# Patient Record
Sex: MALE | Race: WHITE | Employment: OTHER | URBAN - METROPOLITAN AREA
[De-identification: names, ages, dates, MRNs, and addresses within clinical notes are randomized per-mention and may not be internally consistent; named-entity substitution may affect disease eponyms.]

---

## 2017-03-28 ENCOUNTER — HOSPITAL ENCOUNTER (OUTPATIENT)
Dept: LAB | Facility: HOSPITAL | Age: 72
Discharge: HOME | End: 2017-03-28
Attending: INTERNAL MEDICINE
Payer: MEDICARE

## 2017-03-28 ENCOUNTER — PRIOR ORIGINAL RECORDS (OUTPATIENT)
Dept: OTHER | Age: 72
End: 2017-03-28

## 2017-03-28 LAB
ALBUMIN SERPL-MCNC: 3.4 G/DL (ref 3.5–4.8)
ALP LIVER SERPL-CCNC: 102 U/L (ref 45–117)
ALT SERPL-CCNC: 20 U/L (ref 17–63)
AST SERPL-CCNC: 20 U/L (ref 15–41)
BILIRUB SERPL-MCNC: 0.8 MG/DL (ref 0.1–2)
BUN BLD-MCNC: 14 MG/DL (ref 8–20)
CALCIUM BLD-MCNC: 9.1 MG/DL (ref 8.3–10.3)
CHLORIDE: 104 MMOL/L (ref 101–111)
CHOLEST SMN-MCNC: 126 MG/DL (ref ?–200)
CO2: 32 MMOL/L (ref 22–32)
CREAT BLD-MCNC: 0.98 MG/DL (ref 0.7–1.3)
GLUCOSE BLD-MCNC: 96 MG/DL (ref 70–99)
HDLC SERPL-MCNC: 56 MG/DL (ref 45–?)
HDLC SERPL: 2.25 {RATIO} (ref ?–4.97)
LDLC SERPL CALC-MCNC: 54 MG/DL (ref ?–130)
M PROTEIN MFR SERPL ELPH: 7.2 G/DL (ref 6.1–8.3)
NONHDLC SERPL-MCNC: 70 MG/DL (ref ?–130)
POTASSIUM SERPL-SCNC: 3.8 MMOL/L (ref 3.6–5.1)
SODIUM SERPL-SCNC: 142 MMOL/L (ref 136–144)
TRIGLYCERIDES: 79 MG/DL (ref ?–150)
TSI SER-ACNC: 1.72 MIU/ML (ref 0.35–5.5)
VLDL: 16 MG/DL (ref 5–40)

## 2017-03-28 PROCEDURE — 84443 ASSAY THYROID STIM HORMONE: CPT | Performed by: INTERNAL MEDICINE

## 2017-03-28 PROCEDURE — 36415 COLL VENOUS BLD VENIPUNCTURE: CPT | Performed by: INTERNAL MEDICINE

## 2017-03-28 PROCEDURE — 80053 COMPREHEN METABOLIC PANEL: CPT | Performed by: INTERNAL MEDICINE

## 2017-03-28 PROCEDURE — 80061 LIPID PANEL: CPT | Performed by: INTERNAL MEDICINE

## 2017-03-29 LAB
ALBUMIN: 3.4 G/DL
ALKALINE PHOSPHATATE(ALK PHOS): 102 IU/L
ALT (SGPT): 20 U/L
AST (SGOT): 20 U/L
BILIRUBIN TOTAL: 0.8 MG/DL
BUN: 14 MG/DL
CALCIUM: 9.1 MG/DL
CHLORIDE: 104 MEQ/L
CHOLESTEROL, TOTAL: 126 MG/DL
CREATININE, SERUM: 0.98 MG/DL
GLUCOSE: 96 MG/DL
HDL CHOLESTEROL: 56 MG/DL
LDL CHOLESTEROL: 54 MG/DL
NON-HDL CHOLESTEROL: 70 MG/DL
POTASSIUM, SERUM: 3.8 MEQ/L
PROTEIN, TOTAL: 7.2 G/DL
SGOT (AST): 20 IU/L
SGPT (ALT): 20 IU/L
SODIUM: 142 MEQ/L
THYROID STIMULATING HORMONE: 1.72 MLU/L
TRIGLYCERIDES: 79 MG/DL

## 2017-05-30 ENCOUNTER — PRIOR ORIGINAL RECORDS (OUTPATIENT)
Dept: OTHER | Age: 72
End: 2017-05-30

## 2017-08-22 ENCOUNTER — APPOINTMENT (OUTPATIENT)
Dept: GENERAL RADIOLOGY | Facility: HOSPITAL | Age: 72
End: 2017-08-22
Attending: EMERGENCY MEDICINE
Payer: MEDICARE

## 2017-08-22 ENCOUNTER — HOSPITAL ENCOUNTER (EMERGENCY)
Facility: HOSPITAL | Age: 72
Discharge: HOME | End: 2017-08-22
Attending: EMERGENCY MEDICINE
Payer: MEDICARE

## 2017-08-22 ENCOUNTER — APPOINTMENT (OUTPATIENT)
Dept: CT IMAGING | Facility: HOSPITAL | Age: 72
End: 2017-08-22
Attending: EMERGENCY MEDICINE
Payer: MEDICARE

## 2017-08-22 ENCOUNTER — PRIOR ORIGINAL RECORDS (OUTPATIENT)
Dept: OTHER | Age: 72
End: 2017-08-22

## 2017-08-22 VITALS
HEIGHT: 70 IN | SYSTOLIC BLOOD PRESSURE: 124 MMHG | DIASTOLIC BLOOD PRESSURE: 83 MMHG | TEMPERATURE: 98 F | BODY MASS INDEX: 20.76 KG/M2 | HEART RATE: 75 BPM | OXYGEN SATURATION: 98 % | WEIGHT: 145 LBS | RESPIRATION RATE: 17 BRPM

## 2017-08-22 DIAGNOSIS — S92.424A CLOSED NONDISPLACED FRACTURE OF DISTAL PHALANX OF RIGHT GREAT TOE, INITIAL ENCOUNTER: Primary | ICD-10-CM

## 2017-08-22 LAB
ATRIAL RATE: 375 BPM
BASOPHILS # BLD AUTO: 0.07 X10(3) UL (ref 0–0.1)
BASOPHILS NFR BLD AUTO: 0.5 %
BUN BLD-MCNC: 20 MG/DL (ref 8–20)
CALCIUM BLD-MCNC: 9.5 MG/DL (ref 8.3–10.3)
CHLORIDE: 102 MMOL/L (ref 101–111)
CO2: 27 MMOL/L (ref 22–32)
CREAT BLD-MCNC: 1.1 MG/DL (ref 0.7–1.3)
EOSINOPHIL # BLD AUTO: 0.13 X10(3) UL (ref 0–0.3)
EOSINOPHIL NFR BLD AUTO: 1 %
ERYTHROCYTE [DISTWIDTH] IN BLOOD BY AUTOMATED COUNT: 13 % (ref 11.5–16)
GLUCOSE BLD-MCNC: 111 MG/DL (ref 70–99)
HCT VFR BLD AUTO: 41.9 % (ref 37–53)
HGB BLD-MCNC: 14.1 G/DL (ref 13–17)
IMMATURE GRANULOCYTE COUNT: 0.04 X10(3) UL (ref 0–1)
IMMATURE GRANULOCYTE RATIO %: 0.3 %
LYMPHOCYTES # BLD AUTO: 1.06 X10(3) UL (ref 0.9–4)
LYMPHOCYTES NFR BLD AUTO: 7.9 %
MCH RBC QN AUTO: 32.9 PG (ref 27–33.2)
MCHC RBC AUTO-ENTMCNC: 33.7 G/DL (ref 31–37)
MCV RBC AUTO: 97.7 FL (ref 80–99)
MONOCYTES # BLD AUTO: 0.87 X10(3) UL (ref 0.1–0.6)
MONOCYTES NFR BLD AUTO: 6.5 %
NEUTROPHIL ABS PRELIM: 11.18 X10 (3) UL (ref 1.3–6.7)
NEUTROPHILS # BLD AUTO: 11.18 X10(3) UL (ref 1.3–6.7)
NEUTROPHILS NFR BLD AUTO: 83.8 %
PLATELET # BLD AUTO: 223 10(3)UL (ref 150–450)
POTASSIUM SERPL-SCNC: 4.5 MMOL/L (ref 3.6–5.1)
Q-T INTERVAL: 384 MS
QRS DURATION: 92 MS
QTC CALCULATION (BEZET): 448 MS
R AXIS: -56 DEGREES
RBC # BLD AUTO: 4.29 X10(6)UL (ref 3.8–5.8)
RED CELL DISTRIBUTION WIDTH-SD: 46.5 FL (ref 35.1–46.3)
SODIUM SERPL-SCNC: 136 MMOL/L (ref 136–144)
T AXIS: 32 DEGREES
TROPONIN: <0.046 NG/ML (ref ?–0.05)
VENTRICULAR RATE: 82 BPM
WBC # BLD AUTO: 13.4 X10(3) UL (ref 4–13)

## 2017-08-22 PROCEDURE — 99285 EMERGENCY DEPT VISIT HI MDM: CPT

## 2017-08-22 PROCEDURE — 93005 ELECTROCARDIOGRAM TRACING: CPT

## 2017-08-22 PROCEDURE — 73630 X-RAY EXAM OF FOOT: CPT | Performed by: EMERGENCY MEDICINE

## 2017-08-22 PROCEDURE — 85025 COMPLETE CBC W/AUTO DIFF WBC: CPT | Performed by: EMERGENCY MEDICINE

## 2017-08-22 PROCEDURE — 71020 XR CHEST PA + LAT CHEST (CPT=71020): CPT | Performed by: EMERGENCY MEDICINE

## 2017-08-22 PROCEDURE — 70450 CT HEAD/BRAIN W/O DYE: CPT | Performed by: EMERGENCY MEDICINE

## 2017-08-22 PROCEDURE — 28490 TREAT BIG TOE FRACTURE: CPT

## 2017-08-22 PROCEDURE — 36415 COLL VENOUS BLD VENIPUNCTURE: CPT

## 2017-08-22 PROCEDURE — 93010 ELECTROCARDIOGRAM REPORT: CPT

## 2017-08-22 PROCEDURE — 84484 ASSAY OF TROPONIN QUANT: CPT | Performed by: EMERGENCY MEDICINE

## 2017-08-22 PROCEDURE — 80048 BASIC METABOLIC PNL TOTAL CA: CPT | Performed by: EMERGENCY MEDICINE

## 2017-08-22 RX ORDER — METOPROLOL TARTRATE 50 MG/1
50 TABLET, FILM COATED ORAL 2 TIMES DAILY
COMMUNITY
End: 2019-11-12

## 2017-08-22 RX ORDER — LEVOTHYROXINE SODIUM 0.05 MG/1
50 TABLET ORAL
Status: ON HOLD | COMMUNITY
End: 2020-12-05

## 2017-08-22 RX ORDER — MIRTAZAPINE 15 MG/1
15 TABLET, FILM COATED ORAL NIGHTLY
COMMUNITY
End: 2019-11-12

## 2017-08-22 RX ORDER — SIMVASTATIN 20 MG
20 TABLET ORAL NIGHTLY
Status: ON HOLD | COMMUNITY
End: 2020-12-05

## 2017-08-22 NOTE — ED PROVIDER NOTES
Patient Seen in: [facility] Emergency Department    History   Patient presents with:  Fall (musculoskeletal, neurologic)    Stated Complaint: multiple falls hit head cardiac pt     HPI    Fiordaliza Charles is a 75-year-old male presenting with fall.   Patient ha Exam   ED Triage Vitals [08/22/17 0927]  BP: 120/88  Pulse: 74  Resp: 24  Temp: 97.9 °F (36.6 °C)  Temp src: Temporal  SpO2: 99 %  O2 Device: None (Room air)    Current:/83   Pulse 75   Temp 97.9 °F (36.6 °C) (Temporal)   Resp 17   Ht 177.8 cm (5' 10 Fibrillation  Reading: Incomplete right bundle branch block           ============================================================  ED Course  ------------------------------------------------------------  MDM     Since x-rays do show toe fracture that is n

## 2017-08-22 NOTE — ED INITIAL ASSESSMENT (HPI)
Pt w/ tripped and fell one week ago, hit head, R big toe and bruises all over torso. Pt fell last night when trying to walk to BR. Pt in afib at this time. No hx of.

## 2017-09-16 ENCOUNTER — HOSPITAL ENCOUNTER (OUTPATIENT)
Dept: CV DIAGNOSTICS | Facility: HOSPITAL | Age: 72
Discharge: HOME | End: 2017-09-16
Attending: INTERNAL MEDICINE

## 2017-09-16 ENCOUNTER — MYAURORA ACCOUNT LINK (OUTPATIENT)
Dept: OTHER | Age: 72
End: 2017-09-16

## 2017-09-16 DIAGNOSIS — Z95.2 HISTORY OF PROSTHETIC AORTIC VALVE: ICD-10-CM

## 2017-10-03 ENCOUNTER — PRIOR ORIGINAL RECORDS (OUTPATIENT)
Dept: OTHER | Age: 72
End: 2017-10-03

## 2017-12-12 ENCOUNTER — OFFICE VISIT (OUTPATIENT)
Dept: OTHER | Facility: HOSPITAL | Age: 72
End: 2017-12-12
Attending: PREVENTIVE MEDICINE

## 2017-12-12 DIAGNOSIS — Z00.00 PHYSICAL EXAM: Primary | ICD-10-CM

## 2018-02-01 PROBLEM — M72.0 DUPUYTREN'S CONTRACTURE OF BOTH HANDS: Status: ACTIVE | Noted: 2018-02-01

## 2018-03-05 PROBLEM — M72.0 DUPUYTREN'S CONTRACTURE OF LEFT HAND: Status: ACTIVE | Noted: 2018-03-05

## 2018-05-09 ENCOUNTER — HOSPITAL ENCOUNTER (OUTPATIENT)
Dept: LAB | Facility: HOSPITAL | Age: 73
Discharge: HOME | End: 2018-05-09
Attending: INTERNAL MEDICINE
Payer: MEDICARE

## 2018-05-09 ENCOUNTER — PRIOR ORIGINAL RECORDS (OUTPATIENT)
Dept: OTHER | Age: 73
End: 2018-05-09

## 2018-05-09 PROCEDURE — 80053 COMPREHEN METABOLIC PANEL: CPT | Performed by: INTERNAL MEDICINE

## 2018-05-09 PROCEDURE — 80061 LIPID PANEL: CPT | Performed by: INTERNAL MEDICINE

## 2018-05-09 PROCEDURE — 84443 ASSAY THYROID STIM HORMONE: CPT | Performed by: INTERNAL MEDICINE

## 2018-05-09 PROCEDURE — 36415 COLL VENOUS BLD VENIPUNCTURE: CPT | Performed by: INTERNAL MEDICINE

## 2018-05-15 ENCOUNTER — PRIOR ORIGINAL RECORDS (OUTPATIENT)
Dept: OTHER | Age: 73
End: 2018-05-15

## 2018-05-15 ENCOUNTER — MYAURORA ACCOUNT LINK (OUTPATIENT)
Dept: OTHER | Age: 73
End: 2018-05-15

## 2018-05-21 ENCOUNTER — PRIOR ORIGINAL RECORDS (OUTPATIENT)
Dept: OTHER | Age: 73
End: 2018-05-21

## 2018-06-01 LAB
ALBUMIN: 3.7 G/DL
ALKALINE PHOSPHATATE(ALK PHOS): 117 IU/L
BILIRUBIN TOTAL: 1.4 MG/DL
BUN: 14 MG/DL
CALCIUM: 9.5 MG/DL
CHLORIDE: 100 MEQ/L
CHOLESTEROL, TOTAL: 130 MG/DL
CREATININE, SERUM: 1.08 MG/DL
GLUCOSE: 79 MG/DL
HDL CHOLESTEROL: 55 MG/DL
LDL CHOLESTEROL: 55 MG/DL
POTASSIUM, SERUM: 4.5 MEQ/L
PROTEIN, TOTAL: 7.5 G/DL
SGOT (AST): 32 IU/L
SGPT (ALT): 37 IU/L
SODIUM: 138 MEQ/L
THYROID STIMULATING HORMONE: 5.28 MLU/L
TRIGLYCERIDES: 101 MG/DL

## 2018-06-04 ENCOUNTER — MYAURORA ACCOUNT LINK (OUTPATIENT)
Dept: OTHER | Age: 73
End: 2018-06-04

## 2018-06-04 ENCOUNTER — PRIOR ORIGINAL RECORDS (OUTPATIENT)
Dept: OTHER | Age: 73
End: 2018-06-04

## 2018-06-04 ENCOUNTER — HOSPITAL ENCOUNTER (OUTPATIENT)
Dept: CARDIOLOGY CLINIC | Facility: HOSPITAL | Age: 73
Discharge: HOME | End: 2018-06-04
Attending: INTERNAL MEDICINE

## 2018-06-04 DIAGNOSIS — Z95.2 AORTIC VALVE REPLACED: ICD-10-CM

## 2018-06-04 DIAGNOSIS — I65.23 BILATERAL CAROTID ARTERY STENOSIS: ICD-10-CM

## 2018-06-04 DIAGNOSIS — I25.10 CORONARY ARTERIOSCLEROSIS: ICD-10-CM

## 2018-06-07 ENCOUNTER — PRIOR ORIGINAL RECORDS (OUTPATIENT)
Dept: OTHER | Age: 73
End: 2018-06-07

## 2018-07-03 ENCOUNTER — PRIOR ORIGINAL RECORDS (OUTPATIENT)
Dept: OTHER | Age: 73
End: 2018-07-03

## 2018-09-13 ENCOUNTER — PRIOR ORIGINAL RECORDS (OUTPATIENT)
Dept: OTHER | Age: 73
End: 2018-09-13

## 2018-09-13 ENCOUNTER — HOSPITAL ENCOUNTER (OUTPATIENT)
Dept: CV DIAGNOSTICS | Facility: HOSPITAL | Age: 73
Discharge: HOME | End: 2018-09-13
Attending: INTERNAL MEDICINE
Payer: MEDICARE

## 2018-09-13 DIAGNOSIS — I25.10 CORONARY ARTERY DISEASE: ICD-10-CM

## 2018-09-13 DIAGNOSIS — N18.9 CHRONIC KIDNEY DISEASE: ICD-10-CM

## 2018-09-13 DIAGNOSIS — I65.23 BILATERAL CAROTID ARTERY STENOSIS: ICD-10-CM

## 2018-09-13 DIAGNOSIS — I50.20 SYSTOLIC HEART FAILURE (HCC): ICD-10-CM

## 2018-09-13 PROCEDURE — 93350 STRESS TTE ONLY: CPT | Performed by: INTERNAL MEDICINE

## 2018-09-13 PROCEDURE — 93017 CV STRESS TEST TRACING ONLY: CPT | Performed by: INTERNAL MEDICINE

## 2018-09-13 PROCEDURE — 93018 CV STRESS TEST I&R ONLY: CPT | Performed by: INTERNAL MEDICINE

## 2018-09-13 NOTE — PROGRESS NOTES
Pt here for SE. Resting EKG showed a fib HR in the 80s and no symptoms. No h/o afib noted in chart. Dr. Cj Miner office was notified and spoke with Narendra Najera RN.   She d/w Dr. Francisca Montejo who requested the pt to proceed with the SE and then to f/u in his office

## 2018-09-20 ENCOUNTER — PRIOR ORIGINAL RECORDS (OUTPATIENT)
Dept: OTHER | Age: 73
End: 2018-09-20

## 2018-10-01 ENCOUNTER — PRIOR ORIGINAL RECORDS (OUTPATIENT)
Dept: OTHER | Age: 73
End: 2018-10-01

## 2018-10-17 ENCOUNTER — HOSPITAL ENCOUNTER (OUTPATIENT)
Dept: CV DIAGNOSTICS | Facility: HOSPITAL | Age: 73
Discharge: HOME | End: 2018-10-17
Attending: INTERNAL MEDICINE
Payer: MEDICARE

## 2018-10-17 DIAGNOSIS — I50.20 SYSTOLIC HEART FAILURE (HCC): ICD-10-CM

## 2018-10-17 DIAGNOSIS — I48.0 AF (PAROXYSMAL ATRIAL FIBRILLATION) (HCC): ICD-10-CM

## 2018-10-17 DIAGNOSIS — I48.91 A-FIB (HCC): ICD-10-CM

## 2018-10-17 PROCEDURE — 93227 XTRNL ECG REC<48 HR R&I: CPT | Performed by: INTERNAL MEDICINE

## 2018-10-17 PROCEDURE — 93226 XTRNL ECG REC<48 HR SCAN A/R: CPT | Performed by: INTERNAL MEDICINE

## 2018-10-17 PROCEDURE — 93225 XTRNL ECG REC<48 HRS REC: CPT | Performed by: INTERNAL MEDICINE

## 2018-12-26 ENCOUNTER — OFFICE VISIT (OUTPATIENT)
Dept: OTHER | Facility: HOSPITAL | Age: 73
End: 2018-12-26
Attending: PREVENTIVE MEDICINE

## 2018-12-26 ENCOUNTER — PRIOR ORIGINAL RECORDS (OUTPATIENT)
Dept: OTHER | Age: 73
End: 2018-12-26

## 2019-01-10 ENCOUNTER — APPOINTMENT (OUTPATIENT)
Dept: CT IMAGING | Facility: HOSPITAL | Age: 74
DRG: 194 | End: 2019-01-10
Attending: INTERNAL MEDICINE
Payer: MEDICARE

## 2019-01-10 ENCOUNTER — APPOINTMENT (OUTPATIENT)
Dept: GENERAL RADIOLOGY | Facility: HOSPITAL | Age: 74
DRG: 194 | End: 2019-01-10
Attending: EMERGENCY MEDICINE
Payer: MEDICARE

## 2019-01-10 ENCOUNTER — HOSPITAL ENCOUNTER (INPATIENT)
Facility: HOSPITAL | Age: 74
LOS: 2 days | Discharge: HOME | DRG: 194 | End: 2019-01-12
Attending: EMERGENCY MEDICINE | Admitting: HOSPITALIST
Payer: MEDICARE

## 2019-01-10 DIAGNOSIS — Z79.01 ANTICOAGULATED: ICD-10-CM

## 2019-01-10 DIAGNOSIS — R04.2 HEMOPTYSIS: ICD-10-CM

## 2019-01-10 DIAGNOSIS — J18.9 COMMUNITY ACQUIRED PNEUMONIA OF LEFT LUNG, UNSPECIFIED PART OF LUNG: Primary | ICD-10-CM

## 2019-01-10 PROBLEM — E03.9 ACQUIRED HYPOTHYROIDISM: Chronic | Status: ACTIVE | Noted: 2019-01-10

## 2019-01-10 PROBLEM — I48.20 CHRONIC ATRIAL FIBRILLATION (HCC): Chronic | Status: ACTIVE | Noted: 2019-01-10

## 2019-01-10 PROBLEM — E78.00 PURE HYPERCHOLESTEROLEMIA: Status: ACTIVE | Noted: 2019-01-10

## 2019-01-10 LAB
ALBUMIN SERPL-MCNC: 3.8 G/DL (ref 3.1–4.5)
ALBUMIN/GLOB SERPL: 0.9 {RATIO} (ref 1–2)
ALP LIVER SERPL-CCNC: 125 U/L (ref 45–117)
ALT SERPL-CCNC: 32 U/L (ref 17–63)
ANION GAP SERPL CALC-SCNC: 6 MMOL/L (ref 0–18)
ANTIBODY SCREEN: NEGATIVE
APTT PPP: 36 SECONDS (ref 26.1–34.6)
AST SERPL-CCNC: 27 U/L (ref 15–41)
ATRIAL RATE: 113 BPM
BASOPHILS # BLD AUTO: 0.04 X10(3) UL (ref 0–0.1)
BASOPHILS NFR BLD AUTO: 0.7 %
BILIRUB SERPL-MCNC: 1.6 MG/DL (ref 0.1–2)
BUN BLD-MCNC: 17 MG/DL (ref 8–20)
BUN/CREAT SERPL: 15 (ref 10–20)
CALCIUM BLD-MCNC: 9.2 MG/DL (ref 8.3–10.3)
CHLORIDE SERPL-SCNC: 103 MMOL/L (ref 101–111)
CO2 SERPL-SCNC: 30 MMOL/L (ref 22–32)
CREAT BLD-MCNC: 1.13 MG/DL (ref 0.7–1.3)
EOSINOPHIL # BLD AUTO: 0.19 X10(3) UL (ref 0–0.3)
EOSINOPHIL NFR BLD AUTO: 3.2 %
ERYTHROCYTE [DISTWIDTH] IN BLOOD BY AUTOMATED COUNT: 12.8 % (ref 11.5–16)
GLOBULIN PLAS-MCNC: 4.4 G/DL (ref 2.8–4.4)
GLUCOSE BLD-MCNC: 88 MG/DL (ref 70–99)
HCT VFR BLD AUTO: 40.8 % (ref 37–53)
HGB BLD-MCNC: 13 G/DL (ref 13–17)
IMMATURE GRANULOCYTE COUNT: 0.03 X10(3) UL (ref 0–1)
IMMATURE GRANULOCYTE RATIO %: 0.5 %
INR BLD: 1.42 (ref 0.9–1.1)
LYMPHOCYTES # BLD AUTO: 0.87 X10(3) UL (ref 0.9–4)
LYMPHOCYTES NFR BLD AUTO: 14.6 %
M PROTEIN MFR SERPL ELPH: 8.2 G/DL (ref 6.4–8.2)
MCH RBC QN AUTO: 31.6 PG (ref 27–33.2)
MCHC RBC AUTO-ENTMCNC: 31.9 G/DL (ref 31–37)
MCV RBC AUTO: 99 FL (ref 80–99)
MONOCYTES # BLD AUTO: 0.65 X10(3) UL (ref 0.1–1)
MONOCYTES NFR BLD AUTO: 10.9 %
NEUTROPHIL ABS PRELIM: 4.19 X10 (3) UL (ref 1.3–6.7)
NEUTROPHILS # BLD AUTO: 4.19 X10(3) UL (ref 1.3–6.7)
NEUTROPHILS NFR BLD AUTO: 70.1 %
OSMOLALITY SERPL CALC.SUM OF ELEC: 289 MOSM/KG (ref 275–295)
PLATELET # BLD AUTO: 161 10(3)UL (ref 150–450)
POTASSIUM SERPL-SCNC: 3.7 MMOL/L (ref 3.6–5.1)
PSA SERPL DL<=0.01 NG/ML-MCNC: 17.9 SECONDS (ref 12.4–14.7)
Q-T INTERVAL: 378 MS
QRS DURATION: 92 MS
QTC CALCULATION (BEZET): 457 MS
R AXIS: -34 DEGREES
RBC # BLD AUTO: 4.12 X10(6)UL (ref 3.8–5.8)
RED CELL DISTRIBUTION WIDTH-SD: 46.8 FL (ref 35.1–46.3)
RH BLOOD TYPE: POSITIVE
SODIUM SERPL-SCNC: 139 MMOL/L (ref 136–144)
T AXIS: 14 DEGREES
VENTRICULAR RATE: 88 BPM
WBC # BLD AUTO: 6 X10(3) UL (ref 4–13)

## 2019-01-10 PROCEDURE — 71275 CT ANGIOGRAPHY CHEST: CPT | Performed by: INTERNAL MEDICINE

## 2019-01-10 PROCEDURE — 99223 1ST HOSP IP/OBS HIGH 75: CPT | Performed by: HOSPITALIST

## 2019-01-10 PROCEDURE — 71045 X-RAY EXAM CHEST 1 VIEW: CPT | Performed by: EMERGENCY MEDICINE

## 2019-01-10 RX ORDER — ATORVASTATIN CALCIUM 10 MG/1
10 TABLET, FILM COATED ORAL NIGHTLY
Status: DISCONTINUED | OUTPATIENT
Start: 2019-01-10 | End: 2019-01-12

## 2019-01-10 RX ORDER — METOPROLOL TARTRATE 50 MG/1
50 TABLET, FILM COATED ORAL
Status: DISCONTINUED | OUTPATIENT
Start: 2019-01-10 | End: 2019-01-12

## 2019-01-10 RX ORDER — ONDANSETRON 2 MG/ML
4 INJECTION INTRAMUSCULAR; INTRAVENOUS EVERY 6 HOURS PRN
Status: DISCONTINUED | OUTPATIENT
Start: 2019-01-10 | End: 2019-01-12

## 2019-01-10 RX ORDER — METOCLOPRAMIDE HYDROCHLORIDE 5 MG/ML
10 INJECTION INTRAMUSCULAR; INTRAVENOUS EVERY 8 HOURS PRN
Status: DISCONTINUED | OUTPATIENT
Start: 2019-01-10 | End: 2019-01-12

## 2019-01-10 RX ORDER — MIRTAZAPINE 15 MG/1
15 TABLET, FILM COATED ORAL NIGHTLY
Status: DISCONTINUED | OUTPATIENT
Start: 2019-01-10 | End: 2019-01-12

## 2019-01-10 RX ORDER — LEVOTHYROXINE SODIUM 0.05 MG/1
50 TABLET ORAL
Status: DISCONTINUED | OUTPATIENT
Start: 2019-01-10 | End: 2019-01-12

## 2019-01-10 RX ORDER — SODIUM CHLORIDE 9 MG/ML
INJECTION, SOLUTION INTRAVENOUS CONTINUOUS
Status: DISCONTINUED | OUTPATIENT
Start: 2019-01-10 | End: 2019-01-12

## 2019-01-10 RX ORDER — SODIUM CHLORIDE 9 MG/ML
INJECTION, SOLUTION INTRAVENOUS CONTINUOUS
Status: ACTIVE | OUTPATIENT
Start: 2019-01-10 | End: 2019-01-10

## 2019-01-10 RX ORDER — ACETAMINOPHEN 325 MG/1
650 TABLET ORAL EVERY 6 HOURS PRN
Status: DISCONTINUED | OUTPATIENT
Start: 2019-01-10 | End: 2019-01-12

## 2019-01-10 NOTE — CONSULTS
Pulmonary / Critical Care H&P/Consult       NAME: Neeru Barakat - ROOM: [address] [place] [address] - MRN: [id number] - Age: 74year old - :  1945    Date of Admission: 1/10/2019 11:08 AM  Admission Diagnosis: Anticoagulated [Z79.01]  Hemoptysis [R04.2]  Community quittin.3      Smokeless tobacco: Never Used    Substance and Sexual Activity      Alcohol use: Yes        Comment: socially      Drug use: No      Sexual activity: Not on file    Other Topics      Concerns:        Not on file    Social History Narrat Neck:   Post radiation changes on R   Back:     Symmetric, no curvature, ROM normal, no CVA tenderness   Lungs:     Clear to auscultation bilaterally, respirations unlabored   Chest wall:    No tenderness or deformity   Heart:    Regular rate and rhythm,

## 2019-01-10 NOTE — ED INITIAL ASSESSMENT (HPI)
Patient reports he was coughing blood and mucous this morning. Reports he hasn't been sick recently, hx of throat cancer, thinks it could be from irritation in his throat.   Patient in NARD, mask on

## 2019-01-10 NOTE — H&P
[facility]  History and Physical     Georgia Kavitha Patient Status:  Observation    1945 MRN [id number]   [facility] 5NW-A Attending Omid Cabral, [address] Day # 0 PCP Danielle Murphy MD     Chief Complaint: Hemop times daily. Disp:  Rfl:    simvastatin 20 MG Oral Tab Take 20 mg by mouth nightly. Disp:  Rfl:    Levothyroxine Sodium 50 MCG Oral Tab Take 50 mcg by mouth before breakfast. Disp:  Rfl:    mirtazapine 15 MG Oral Tab Take 15 mg by mouth nightly.  Disp:  Rfl INR  1.42*       No results for input(s): TROP, CK in the last 168 hours. Imaging: Imaging data reviewed in Epic. ASSESSMENT / PLAN:     1. Hemoptysis-we will hold Eliquis. Pulmonology on consult may need bron. Will monitor hemoglobin levels.

## 2019-01-10 NOTE — ED PROVIDER NOTES
Patient Seen in: [facility] Emergency Department    History   Patient presents with:  Hemoptysis (respiratory): hx of throat cancer    Stated Complaint: coughing up blood since this morning.     HPI    53-year-old male comes the hospital complaint of signs reviewed. All other systems reviewed and negative except as noted above.     Physical Exam     ED Triage Vitals   BP 01/10/19 1130 (!) 161/97   Pulse 01/10/19 1040 84   Resp 01/10/19 1040 18   Temp 01/10/19 1040 97.4 °F (36.3 °C)   Temp src 01/10 -----------         ------                     CBC W/ DIFFERENTIAL[[id number]]          Abnormal            Final result                 Please view results for these tests on the individual orders. TYPE AND SCREEN    Narrative:      The following o 0.9 % 250 mL IVPB (not administered)           MDM   Patient was given antibiotics for his pneumonia.   Patient's bleeding may be worsening with his anticoagulation and the case will be admitted the hospital for further workup and care with pulmonology on c

## 2019-01-11 ENCOUNTER — PRIOR ORIGINAL RECORDS (OUTPATIENT)
Dept: OTHER | Age: 74
End: 2019-01-11

## 2019-01-11 LAB
ADENOVIRUS PCR:: NEGATIVE
ANION GAP SERPL CALC-SCNC: 7 MMOL/L (ref 0–18)
B PERT DNA SPEC QL NAA+PROBE: NEGATIVE
BASOPHILS # BLD AUTO: 0.03 X10(3) UL (ref 0–0.1)
BASOPHILS NFR BLD AUTO: 0.7 %
BUN BLD-MCNC: 16 MG/DL (ref 8–20)
BUN/CREAT SERPL: 16.2 (ref 10–20)
C PNEUM DNA SPEC QL NAA+PROBE: NEGATIVE
CALCIUM BLD-MCNC: 8.4 MG/DL (ref 8.3–10.3)
CHLORIDE SERPL-SCNC: 108 MMOL/L (ref 101–111)
CO2 SERPL-SCNC: 25 MMOL/L (ref 22–32)
CORONAVIRUS 229E PCR:: NEGATIVE
CORONAVIRUS HKU1 PCR:: NEGATIVE
CORONAVIRUS NL63 PCR:: NEGATIVE
CORONAVIRUS OC43 PCR:: NEGATIVE
CREAT BLD-MCNC: 0.99 MG/DL (ref 0.7–1.3)
EOSINOPHIL # BLD AUTO: 0.18 X10(3) UL (ref 0–0.3)
EOSINOPHIL NFR BLD AUTO: 4.1 %
ERYTHROCYTE [DISTWIDTH] IN BLOOD BY AUTOMATED COUNT: 12.7 % (ref 11.5–16)
FLUAV RNA SPEC QL NAA+PROBE: NEGATIVE
FLUBV RNA SPEC QL NAA+PROBE: NEGATIVE
GLUCOSE BLD-MCNC: 71 MG/DL (ref 70–99)
HCT VFR BLD AUTO: 36 % (ref 37–53)
HGB BLD-MCNC: 11.5 G/DL (ref 13–17)
IMMATURE GRANULOCYTE COUNT: 0.02 X10(3) UL (ref 0–1)
IMMATURE GRANULOCYTE RATIO %: 0.5 %
LYMPHOCYTES # BLD AUTO: 0.73 X10(3) UL (ref 0.9–4)
LYMPHOCYTES NFR BLD AUTO: 16.6 %
MCH RBC QN AUTO: 31.9 PG (ref 27–33.2)
MCHC RBC AUTO-ENTMCNC: 31.9 G/DL (ref 31–37)
MCV RBC AUTO: 100 FL (ref 80–99)
METAPNEUMOVIRUS PCR:: NEGATIVE
MONOCYTES # BLD AUTO: 0.61 X10(3) UL (ref 0.1–1)
MONOCYTES NFR BLD AUTO: 13.9 %
MYCOPLASMA PNEUMONIA PCR:: NEGATIVE
NEUTROPHIL ABS PRELIM: 2.82 X10 (3) UL (ref 1.3–6.7)
NEUTROPHILS # BLD AUTO: 2.82 X10(3) UL (ref 1.3–6.7)
NEUTROPHILS NFR BLD AUTO: 64.2 %
OSMOLALITY SERPL CALC.SUM OF ELEC: 290 MOSM/KG (ref 275–295)
PARAINFLUENZA 1 PCR:: NEGATIVE
PARAINFLUENZA 2 PCR:: NEGATIVE
PARAINFLUENZA 3 PCR:: NEGATIVE
PARAINFLUENZA 4 PCR:: NEGATIVE
PLATELET # BLD AUTO: 115 10(3)UL (ref 150–450)
POTASSIUM SERPL-SCNC: 3.8 MMOL/L (ref 3.6–5.1)
PROCALCITONIN SERPL-MCNC: <0.11 NG/ML
RBC # BLD AUTO: 3.6 X10(6)UL (ref 3.8–5.8)
RED CELL DISTRIBUTION WIDTH-SD: 46.9 FL (ref 35.1–46.3)
RHINOVIRUS/ENTERO PCR:: NEGATIVE
RSV RNA SPEC QL NAA+PROBE: NEGATIVE
SODIUM SERPL-SCNC: 140 MMOL/L (ref 136–144)
WBC # BLD AUTO: 4.4 X10(3) UL (ref 4–13)

## 2019-01-11 PROCEDURE — 99232 SBSQ HOSP IP/OBS MODERATE 35: CPT | Performed by: HOSPITALIST

## 2019-01-11 NOTE — PLAN OF CARE
Patient/Family Goals    • Patient/Family Short Term Goal Progressing          Patient/Family Goals    • Patient/Family Short Term Goal Progressing        Received patient A/O x4, VSS with no complaints of pain.  Patient is up with a steady gait to the bathr

## 2019-01-11 NOTE — CM/SW NOTE
01/11/19 1200   CM/SW Screening   Referral Source Social Work (self-referral)   Information Source Chart review;Nursing rounds   Patient's Mental Status Alert;Oriented   Patient lives with Spouse   Patient Status Prior to Admission   Independent with AD

## 2019-01-11 NOTE — HOME CARE LIAISON
Met with patient at the bedside. Patient is declined [facility]. Brochure and liaison's card provided with contact information. All questions addressed and answered.

## 2019-01-11 NOTE — PLAN OF CARE
Patient/Family Goals    • Patient/Family Long Term Goal Progressing    • Patient/Family Short Term Goal Progressing               Pt is A+Ox4. VSS on RA. Dentures. Pt admits to some small amounts of hemoptysis this AM. Sputum sample obtained. RVP pending.

## 2019-01-11 NOTE — PROGRESS NOTES
[address] [facility] Patient Status:  Inpatient    1945 MRN [id number]   [facility] 5NW-A Attending Tiffany Ascencio MD   Hosp Day # 1 PCP Jared Penaloza MD     SUBJECTIVE: No further hemoptysis overnight, very small volu non-tender, non-distended, positive BS.                         Extremity: No clubbing or cyanosis. no edema                          Skin: No rashes or lesions.        Recent Labs   Lab  01/10/19   1145  01/11/19   0637   GLU  88  71   BUN  17  16   CREAT

## 2019-01-11 NOTE — PROGRESS NOTES
[facility]  Progress Note     Juhi Robbins Patient Status:  Inpatient    1945 MRN [id number]   [facility] 5NW-A Attending Dayo Pryor MD   Hosp Day # 1 PCP Jenifer Goetz MD     Chief Complaint: Hemoptysis    S: Patient mg Oral Nightly   • azithromycin  500 mg Intravenous Q24H       ASSESSMENT / PLAN:     1. Hemoptysis:  Resolved, off eliquis  2. Aortic Aneurysm:  No chest or back pain, will refer back to [facility] for surveillance  3. PAF  4. Pneumonia:  IV abx  5.  Hypothyroid

## 2019-01-12 VITALS
TEMPERATURE: 98 F | RESPIRATION RATE: 16 BRPM | WEIGHT: 145 LBS | DIASTOLIC BLOOD PRESSURE: 83 MMHG | HEIGHT: 70 IN | SYSTOLIC BLOOD PRESSURE: 133 MMHG | BODY MASS INDEX: 20.76 KG/M2 | OXYGEN SATURATION: 100 % | HEART RATE: 70 BPM

## 2019-01-12 LAB
LEGIONELLA PNEUMOPHILA AG, URI: NEGATIVE
STREPTOCOCCUS PNEUMONIAE AG, U: NEGATIVE

## 2019-01-12 PROCEDURE — 99239 HOSP IP/OBS DSCHRG MGMT >30: CPT | Performed by: HOSPITALIST

## 2019-01-12 RX ORDER — CEFUROXIME AXETIL 500 MG/1
500 TABLET ORAL 2 TIMES DAILY
Qty: 10 TABLET | Refills: 0 | Status: SHIPPED | OUTPATIENT
Start: 2019-01-12 | End: 2019-01-17

## 2019-01-12 NOTE — PROGRESS NOTES
[facility]  Progress Note     Iqra Still Patient Status:  Inpatient    1945 MRN [id number]   [facility] 5NW-A Attending Nikunj Herrera MD   Hosp Day # 2 PCP Edward Villarreal MD     Chief Complaint: Hemoptysis    S: Patient Oral Nightly   • azithromycin  500 mg Intravenous Q24H       ASSESSMENT / PLAN:     1. Hemoptysis:  Resolved, off eliquis  2. Aortic Aneurysm:  No chest or back pain, will refer back to [facility] for surveillance  3. PAF  4. Pneumonia:  IV abx  5.  Hypothyroidism

## 2019-01-12 NOTE — DISCHARGE SUMMARY
[facility]OSPITAL[facility]  DISCHARGE SUMMARY     Eduard Hamilton Patient Status:  Inpatient    1945 MRN [id number]   [facility] 5NW-A Attending Danica Williamson MD   Hosp Day # 2 PCP Chinedu Whitfield MD     Date of Admission: 1/10/2019  Date of recommendations (brief descriptions):  • None    Lab/Test results pending at Discharge:   · None    Consultants:  • Dr. Sanchez Nahomi    Discharge Medication List:     Discharge Medications      START taking these medications      Instructions Prescription deta [97.7 °F (36.5 °C)-98.1 °F (36.7 °C)] 98.1 °F (36.7 °C)  Pulse:  [70-86] 70  Resp:  [16] 16  BP: (128-157)/(83-89) 133/83    Physical Exam:    General: No acute distress. Respiratory: Clear to auscultation bilaterally. No wheezes. No rhonchi.   Cardiovasc

## 2019-01-12 NOTE — PROGRESS NOTES
Pulmonary Progress Note      NAME: Wood Manning - ROOM: 077/640-I - MRN: [id number] - Age: 74year old - : 1945    Assessment/Plan:  1. Hemoptysis: cxr with L sided opacities. Chronically on eliquis.  Suspect hemoptysis related to pneumonia and a CREATSERUM  1.13  0.99   GFRAA  73  86   GFRNAA  63  74   CA  9.2  8.4   ALB  3.8   --    NA  139  140   K  3.7  3.8   CL  103  108   CO2  30.0  25.0   ALKPHO  125*   --    AST  27   --    ALT  32   --    BILT  1.6   --    TP  8.2   --        Imaging:  I

## 2019-01-12 NOTE — PLAN OF CARE
Patient/Family Goals    • Patient/Family Long Term Goal Progressing    • Patient/Family Short Term Goal Progressing        RESPIRATORY - ADULT    • Achieves optimal ventilation and oxygenation Progressing          PROBLEM:PNA    ASSESSMENT:P IS ALERT TIMES

## 2019-02-01 ENCOUNTER — MYAURORA ACCOUNT LINK (OUTPATIENT)
Dept: OTHER | Age: 74
End: 2019-02-01

## 2019-02-01 ENCOUNTER — PRIOR ORIGINAL RECORDS (OUTPATIENT)
Dept: OTHER | Age: 74
End: 2019-02-01

## 2019-02-05 LAB
ALBUMIN: 3.9 G/DL
ALKALINE PHOSPHATATE(ALK PHOS): 108 IU/L
BILIRUBIN TOTAL: 1.2 MG/DL
BUN: 14 MG/DL
BUN: 16 MG/DL
CALCIUM: 8.4 MG/DL
CALCIUM: 9 MG/DL
CHLORIDE: 101 MEQ/L
CHLORIDE: 108 MEQ/L
CHOLESTEROL, TOTAL: 126 MG/DL
CREATININE, SERUM: 0.99 MG/DL
CREATININE, SERUM: 1.12 MG/DL
GGT: 66 IU/L
GLOBULIN: 2.7 G/DL
GLUCOSE: 71 MG/DL
GLUCOSE: 90 MG/DL
HDL CHOLESTEROL: 56 MG/DL
HEMATOCRIT: 35.9 %
HEMATOCRIT: 36 %
HEMOGLOBIN: 11.5 G/DL
HEMOGLOBIN: 12.3 G/DL
IRON, TOTAL: 57 MCG/DL
LDH: 159 IU/L
LDL CHOLESTEROL: 56 MG/DL
PLATELETS: 115 K/UL
PLATELETS: 130 K/UL
POTASSIUM, SERUM: 3.8 MEQ/L
POTASSIUM, SERUM: 4.3 MEQ/L
PROTEIN, TOTAL: 6.6 G/DL
RED BLOOD COUNT: 3.6 X 10-6/U
RED BLOOD COUNT: 3.73 X 10-6/U
SGOT (AST): 30 IU/L
SGPT (ALT): 26 IU/L
SODIUM: 138 MEQ/L
SODIUM: 140 MEQ/L
TRIGLYCERIDES: 64 MG/DL
WHITE BLOOD COUNT: 4.2 X 10-3/U
WHITE BLOOD COUNT: 4.4 X 10-3/U

## 2019-02-26 RX ORDER — SIMVASTATIN 20 MG
1 TABLET ORAL AT BEDTIME
COMMUNITY
Start: 2018-05-21 | End: 2019-07-11 | Stop reason: SDUPTHER

## 2019-02-26 RX ORDER — TADALAFIL 10 MG/1
TABLET ORAL
COMMUNITY
Start: 2012-06-05 | End: 2023-05-02 | Stop reason: SDUPTHER

## 2019-02-26 RX ORDER — MIRTAZAPINE 15 MG/1
1 TABLET, FILM COATED ORAL AT BEDTIME
COMMUNITY
Start: 2011-04-12 | End: 2020-02-25 | Stop reason: SDUPTHER

## 2019-02-26 RX ORDER — LEVOTHYROXINE SODIUM 0.03 MG/1
1 TABLET ORAL DAILY
COMMUNITY
Start: 2011-04-12 | End: 2022-09-22 | Stop reason: SDUPTHER

## 2019-02-26 RX ORDER — METOPROLOL TARTRATE 50 MG/1
TABLET, FILM COATED ORAL
COMMUNITY
Start: 2018-05-21 | End: 2019-07-11 | Stop reason: SDUPTHER

## 2019-02-28 VITALS
DIASTOLIC BLOOD PRESSURE: 72 MMHG | HEIGHT: 69 IN | SYSTOLIC BLOOD PRESSURE: 112 MMHG | WEIGHT: 147 LBS | BODY MASS INDEX: 21.77 KG/M2 | HEART RATE: 60 BPM

## 2019-02-28 VITALS
SYSTOLIC BLOOD PRESSURE: 130 MMHG | HEART RATE: 60 BPM | WEIGHT: 142 LBS | BODY MASS INDEX: 21.03 KG/M2 | HEIGHT: 69 IN | DIASTOLIC BLOOD PRESSURE: 90 MMHG

## 2019-03-01 VITALS
HEART RATE: 66 BPM | DIASTOLIC BLOOD PRESSURE: 66 MMHG | WEIGHT: 148 LBS | BODY MASS INDEX: 21.92 KG/M2 | SYSTOLIC BLOOD PRESSURE: 112 MMHG | HEIGHT: 69 IN

## 2019-03-04 ENCOUNTER — HOSPITAL ENCOUNTER (OUTPATIENT)
Dept: CV DIAGNOSTICS | Facility: HOSPITAL | Age: 74
Discharge: HOME | End: 2019-03-04
Attending: INTERNAL MEDICINE

## 2019-03-04 DIAGNOSIS — Z95.2 HEART VALVE REPLACED: ICD-10-CM

## 2019-03-04 DIAGNOSIS — I25.10 CORONARY ARTERIOSCLEROSIS: ICD-10-CM

## 2019-03-04 DIAGNOSIS — I65.23 BILATERAL CAROTID ARTERY STENOSIS: ICD-10-CM

## 2019-03-19 ENCOUNTER — APPOINTMENT (OUTPATIENT)
Dept: CARDIOLOGY | Age: 74
End: 2019-03-19

## 2019-05-21 ENCOUNTER — OFFICE VISIT (OUTPATIENT)
Dept: CARDIOLOGY | Age: 74
End: 2019-05-21

## 2019-05-21 VITALS
HEIGHT: 69 IN | HEART RATE: 80 BPM | BODY MASS INDEX: 21.33 KG/M2 | DIASTOLIC BLOOD PRESSURE: 74 MMHG | WEIGHT: 144 LBS | SYSTOLIC BLOOD PRESSURE: 148 MMHG

## 2019-05-21 DIAGNOSIS — N18.30 CHRONIC RENAL IMPAIRMENT, STAGE 3 (MODERATE) (CMD): ICD-10-CM

## 2019-05-21 DIAGNOSIS — I42.0 DILATED CARDIOMYOPATHY (CMD): Primary | ICD-10-CM

## 2019-05-21 DIAGNOSIS — I65.23 ASYMPTOMATIC CAROTID ARTERY STENOSIS, BILATERAL: ICD-10-CM

## 2019-05-21 DIAGNOSIS — Z98.890 H/O MITRAL VALVE REPAIR: ICD-10-CM

## 2019-05-21 DIAGNOSIS — Z95.3 HISTORY OF AORTIC VALVE REPLACEMENT WITH BIOPROSTHETIC VALVE: ICD-10-CM

## 2019-05-21 DIAGNOSIS — Z87.891 HISTORY OF SMOKING: ICD-10-CM

## 2019-05-21 DIAGNOSIS — Z79.01 CURRENT USE OF LONG TERM ANTICOAGULATION: ICD-10-CM

## 2019-05-21 DIAGNOSIS — I48.20 CHRONIC ATRIAL FIBRILLATION (CMD): ICD-10-CM

## 2019-05-21 DIAGNOSIS — E78.00 PURE HYPERCHOLESTEROLEMIA: ICD-10-CM

## 2019-05-21 DIAGNOSIS — J44.9 CHRONIC OBSTRUCTIVE PULMONARY DISEASE, UNSPECIFIED COPD TYPE (CMD): ICD-10-CM

## 2019-05-21 PROCEDURE — 99215 OFFICE O/P EST HI 40 MIN: CPT | Performed by: INTERNAL MEDICINE

## 2019-05-23 ENCOUNTER — TELEPHONE (OUTPATIENT)
Dept: CARDIOLOGY | Age: 74
End: 2019-05-23

## 2019-07-11 ENCOUNTER — TELEPHONE (OUTPATIENT)
Dept: CARDIOLOGY | Age: 74
End: 2019-07-11

## 2019-07-11 DIAGNOSIS — I42.0 DILATED CARDIOMYOPATHY (CMD): Primary | ICD-10-CM

## 2019-07-11 RX ORDER — METOPROLOL TARTRATE 50 MG/1
TABLET, FILM COATED ORAL
Qty: 220 TABLET | Refills: 3 | Status: SHIPPED | OUTPATIENT
Start: 2019-07-11 | End: 2020-02-14 | Stop reason: SDUPTHER

## 2019-07-11 RX ORDER — SIMVASTATIN 20 MG
20 TABLET ORAL AT BEDTIME
Qty: 90 TABLET | Refills: 1 | Status: SHIPPED | OUTPATIENT
Start: 2019-07-11 | End: 2020-01-23 | Stop reason: SDUPTHER

## 2019-08-06 ENCOUNTER — APPOINTMENT (OUTPATIENT)
Dept: CT IMAGING | Facility: HOSPITAL | Age: 74
DRG: 177 | End: 2019-08-06
Payer: MEDICARE

## 2019-08-06 ENCOUNTER — HOSPITAL ENCOUNTER (INPATIENT)
Facility: HOSPITAL | Age: 74
LOS: 3 days | Discharge: HOME | DRG: 177 | End: 2019-08-10
Admitting: HOSPITALIST
Payer: MEDICARE

## 2019-08-06 ENCOUNTER — APPOINTMENT (OUTPATIENT)
Dept: GENERAL RADIOLOGY | Facility: HOSPITAL | Age: 74
DRG: 177 | End: 2019-08-06
Payer: MEDICARE

## 2019-08-06 DIAGNOSIS — R77.8 ELEVATED TROPONIN: ICD-10-CM

## 2019-08-06 DIAGNOSIS — K52.9 GASTROENTERITIS: ICD-10-CM

## 2019-08-06 DIAGNOSIS — R55 SYNCOPE, NEAR: Primary | ICD-10-CM

## 2019-08-06 DIAGNOSIS — J18.9 COMMUNITY ACQUIRED PNEUMONIA OF LEFT LOWER LOBE OF LUNG: ICD-10-CM

## 2019-08-06 LAB
ALBUMIN SERPL-MCNC: 3 G/DL (ref 3.4–5)
ALBUMIN/GLOB SERPL: 0.8 {RATIO} (ref 1–2)
ALP LIVER SERPL-CCNC: 100 U/L (ref 45–117)
ALT SERPL-CCNC: 21 U/L (ref 16–61)
ANION GAP SERPL CALC-SCNC: 6 MMOL/L (ref 0–18)
APTT PPP: 30.9 SECONDS (ref 25.4–36.1)
AST SERPL-CCNC: 19 U/L (ref 15–37)
BASOPHILS # BLD AUTO: 0.04 X10(3) UL (ref 0–0.2)
BASOPHILS NFR BLD AUTO: 0.4 %
BILIRUB SERPL-MCNC: 2.2 MG/DL (ref 0.1–2)
BUN BLD-MCNC: 29 MG/DL (ref 7–18)
BUN/CREAT SERPL: 25.7 (ref 10–20)
CALCIUM BLD-MCNC: 8.8 MG/DL (ref 8.5–10.1)
CHLORIDE SERPL-SCNC: 98 MMOL/L (ref 98–112)
CO2 SERPL-SCNC: 30 MMOL/L (ref 21–32)
CREAT BLD-MCNC: 1.13 MG/DL (ref 0.7–1.3)
DEPRECATED RDW RBC AUTO: 50.4 FL (ref 35.1–46.3)
EOSINOPHIL # BLD AUTO: 0.02 X10(3) UL (ref 0–0.7)
EOSINOPHIL NFR BLD AUTO: 0.2 %
ERYTHROCYTE [DISTWIDTH] IN BLOOD BY AUTOMATED COUNT: 14.1 % (ref 11–15)
GLOBULIN PLAS-MCNC: 3.8 G/DL (ref 2.8–4.4)
GLUCOSE BLD-MCNC: 136 MG/DL (ref 70–99)
GLUCOSE BLD-MCNC: 139 MG/DL (ref 70–99)
HCT VFR BLD AUTO: 43.4 % (ref 39–53)
HGB BLD-MCNC: 14.5 G/DL (ref 13–17.5)
IMM GRANULOCYTES # BLD AUTO: 0.07 X10(3) UL (ref 0–1)
IMM GRANULOCYTES NFR BLD: 0.6 %
INR BLD: 1.17 (ref 0.9–1.1)
LIPASE SERPL-CCNC: 141 U/L (ref 73–393)
LYMPHOCYTES # BLD AUTO: 0.73 X10(3) UL (ref 1–4)
LYMPHOCYTES NFR BLD AUTO: 6.4 %
M PROTEIN MFR SERPL ELPH: 6.8 G/DL (ref 6.4–8.2)
MCH RBC QN AUTO: 32.6 PG (ref 26–34)
MCHC RBC AUTO-ENTMCNC: 33.4 G/DL (ref 31–37)
MCV RBC AUTO: 97.5 FL (ref 80–100)
MONOCYTES # BLD AUTO: 1.43 X10(3) UL (ref 0.1–1)
MONOCYTES NFR BLD AUTO: 12.6 %
NEUTROPHILS # BLD AUTO: 9.08 X10 (3) UL (ref 1.5–7.7)
NEUTROPHILS # BLD AUTO: 9.08 X10(3) UL (ref 1.5–7.7)
NEUTROPHILS NFR BLD AUTO: 79.8 %
OSMOLALITY SERPL CALC.SUM OF ELEC: 286 MOSM/KG (ref 275–295)
PLATELET # BLD AUTO: 146 10(3)UL (ref 150–450)
POTASSIUM SERPL-SCNC: 3.6 MMOL/L (ref 3.5–5.1)
PSA SERPL DL<=0.01 NG/ML-MCNC: 15.4 SECONDS (ref 12.5–14.7)
RBC # BLD AUTO: 4.45 X10(6)UL (ref 3.8–5.8)
SODIUM SERPL-SCNC: 134 MMOL/L (ref 136–145)
TROPONIN I SERPL-MCNC: 0.27 NG/ML (ref ?–0.04)
WBC # BLD AUTO: 11.4 X10(3) UL (ref 4–11)

## 2019-08-06 PROCEDURE — 74177 CT ABD & PELVIS W/CONTRAST: CPT

## 2019-08-06 PROCEDURE — 99223 1ST HOSP IP/OBS HIGH 75: CPT | Performed by: HOSPITALIST

## 2019-08-06 PROCEDURE — 71045 X-RAY EXAM CHEST 1 VIEW: CPT

## 2019-08-06 RX ORDER — ASPIRIN 81 MG/1
324 TABLET, CHEWABLE ORAL ONCE
Status: COMPLETED | OUTPATIENT
Start: 2019-08-06 | End: 2019-08-07

## 2019-08-06 RX ORDER — SODIUM CHLORIDE 9 MG/ML
INJECTION, SOLUTION INTRAVENOUS CONTINUOUS
Status: DISCONTINUED | OUTPATIENT
Start: 2019-08-06 | End: 2019-08-07

## 2019-08-06 RX ORDER — FAMOTIDINE 10 MG/ML
20 INJECTION, SOLUTION INTRAVENOUS ONCE
Status: COMPLETED | OUTPATIENT
Start: 2019-08-06 | End: 2019-08-06

## 2019-08-06 RX ORDER — SODIUM CHLORIDE 9 MG/ML
1000 INJECTION, SOLUTION INTRAVENOUS ONCE
Status: COMPLETED | OUTPATIENT
Start: 2019-08-06 | End: 2019-08-06

## 2019-08-06 RX ORDER — ONDANSETRON 2 MG/ML
4 INJECTION INTRAMUSCULAR; INTRAVENOUS ONCE
Status: COMPLETED | OUTPATIENT
Start: 2019-08-06 | End: 2019-08-06

## 2019-08-07 ENCOUNTER — APPOINTMENT (OUTPATIENT)
Dept: CV DIAGNOSTICS | Facility: HOSPITAL | Age: 74
DRG: 177 | End: 2019-08-07
Attending: INTERNAL MEDICINE
Payer: MEDICARE

## 2019-08-07 PROBLEM — R79.89 ELEVATED TROPONIN: Status: ACTIVE | Noted: 2019-08-07

## 2019-08-07 PROBLEM — J18.9 COMMUNITY ACQUIRED PNEUMONIA OF LEFT LOWER LOBE OF LUNG: Status: ACTIVE | Noted: 2019-08-07

## 2019-08-07 PROBLEM — R11.10 VOMITING: Status: ACTIVE | Noted: 2019-08-07

## 2019-08-07 PROBLEM — K52.9 GASTROENTERITIS: Status: ACTIVE | Noted: 2019-08-07

## 2019-08-07 PROBLEM — R77.8 ELEVATED TROPONIN: Status: ACTIVE | Noted: 2019-08-07

## 2019-08-07 PROBLEM — Z95.3 HISTORY OF AORTIC VALVE REPLACEMENT WITH BIOPROSTHETIC VALVE: Status: ACTIVE | Noted: 2019-08-07

## 2019-08-07 PROBLEM — R19.7 DIARRHEA: Status: ACTIVE | Noted: 2019-08-07

## 2019-08-07 LAB
ANION GAP SERPL CALC-SCNC: 6 MMOL/L (ref 0–18)
ANION GAP SERPL CALC-SCNC: 6 MMOL/L (ref 0–18)
APTT PPP: 32.4 SECONDS (ref 25.4–36.1)
APTT PPP: 88.1 SECONDS (ref 25.4–36.1)
APTT PPP: 90.9 SECONDS (ref 25.4–36.1)
BILIRUB UR QL STRIP.AUTO: NEGATIVE
BUN BLD-MCNC: 24 MG/DL (ref 7–18)
BUN BLD-MCNC: 25 MG/DL (ref 7–18)
BUN/CREAT SERPL: 24.5 (ref 10–20)
BUN/CREAT SERPL: 25.8 (ref 10–20)
C DIFF TOX B STL QL: NEGATIVE
CALCIUM BLD-MCNC: 8.4 MG/DL (ref 8.5–10.1)
CALCIUM BLD-MCNC: 8.5 MG/DL (ref 8.5–10.1)
CHLORIDE SERPL-SCNC: 104 MMOL/L (ref 98–112)
CHLORIDE SERPL-SCNC: 105 MMOL/L (ref 98–112)
CLARITY UR REFRACT.AUTO: CLEAR
CO2 SERPL-SCNC: 26 MMOL/L (ref 21–32)
CO2 SERPL-SCNC: 27 MMOL/L (ref 21–32)
COLOR UR AUTO: YELLOW
COMPLEXED PSA SERPL-MCNC: 6.27 NG/ML (ref ?–4)
CREAT BLD-MCNC: 0.93 MG/DL (ref 0.7–1.3)
CREAT BLD-MCNC: 1.02 MG/DL (ref 0.7–1.3)
DEPRECATED RDW RBC AUTO: 53.3 FL (ref 35.1–46.3)
ERYTHROCYTE [DISTWIDTH] IN BLOOD BY AUTOMATED COUNT: 14.2 % (ref 11–15)
GLUCOSE BLD-MCNC: 88 MG/DL (ref 70–99)
GLUCOSE BLD-MCNC: 92 MG/DL (ref 70–99)
GLUCOSE UR STRIP.AUTO-MCNC: NEGATIVE MG/DL
HCT VFR BLD AUTO: 41.5 % (ref 39–53)
HGB BLD-MCNC: 13.4 G/DL (ref 13–17.5)
LEUKOCYTE ESTERASE UR QL STRIP.AUTO: NEGATIVE
MCH RBC QN AUTO: 32.8 PG (ref 26–34)
MCHC RBC AUTO-ENTMCNC: 32.3 G/DL (ref 31–37)
MCV RBC AUTO: 101.7 FL (ref 80–100)
NITRITE UR QL STRIP.AUTO: NEGATIVE
OSMOLALITY SERPL CALC.SUM OF ELEC: 287 MOSM/KG (ref 275–295)
OSMOLALITY SERPL CALC.SUM OF ELEC: 288 MOSM/KG (ref 275–295)
PH UR STRIP.AUTO: 6 [PH] (ref 4.5–8)
PLATELET # BLD AUTO: 131 10(3)UL (ref 150–450)
POTASSIUM SERPL-SCNC: 4.1 MMOL/L (ref 3.5–5.1)
POTASSIUM SERPL-SCNC: 4.3 MMOL/L (ref 3.5–5.1)
PROT UR STRIP.AUTO-MCNC: 30 MG/DL
RBC # BLD AUTO: 4.08 X10(6)UL (ref 3.8–5.8)
RBC UR QL AUTO: NEGATIVE
SODIUM SERPL-SCNC: 137 MMOL/L (ref 136–145)
SODIUM SERPL-SCNC: 137 MMOL/L (ref 136–145)
SP GR UR STRIP.AUTO: 1.05 (ref 1–1.03)
TROPONIN I SERPL-MCNC: 0.16 NG/ML (ref ?–0.04)
TROPONIN I SERPL-MCNC: 0.17 NG/ML (ref ?–0.04)
UROBILINOGEN UR STRIP.AUTO-MCNC: <2 MG/DL
WBC # BLD AUTO: 6.4 X10(3) UL (ref 4–11)

## 2019-08-07 PROCEDURE — 99233 SBSQ HOSP IP/OBS HIGH 50: CPT | Performed by: HOSPITALIST

## 2019-08-07 PROCEDURE — 93306 TTE W/DOPPLER COMPLETE: CPT | Performed by: INTERNAL MEDICINE

## 2019-08-07 PROCEDURE — 99222 1ST HOSP IP/OBS MODERATE 55: CPT | Performed by: INTERNAL MEDICINE

## 2019-08-07 RX ORDER — ONDANSETRON 2 MG/ML
4 INJECTION INTRAMUSCULAR; INTRAVENOUS EVERY 6 HOURS PRN
Status: DISCONTINUED | OUTPATIENT
Start: 2019-08-07 | End: 2019-08-10

## 2019-08-07 RX ORDER — HEPARIN SODIUM 5000 [USP'U]/ML
60 INJECTION INTRAVENOUS; SUBCUTANEOUS ONCE
Status: COMPLETED | OUTPATIENT
Start: 2019-08-07 | End: 2019-08-07

## 2019-08-07 RX ORDER — LEVOTHYROXINE SODIUM 0.05 MG/1
50 TABLET ORAL
Status: DISCONTINUED | OUTPATIENT
Start: 2019-08-07 | End: 2019-08-10

## 2019-08-07 RX ORDER — HEPARIN SODIUM AND DEXTROSE 10000; 5 [USP'U]/100ML; G/100ML
INJECTION INTRAVENOUS CONTINUOUS
Status: DISCONTINUED | OUTPATIENT
Start: 2019-08-07 | End: 2019-08-10

## 2019-08-07 RX ORDER — METOCLOPRAMIDE HYDROCHLORIDE 5 MG/ML
10 INJECTION INTRAMUSCULAR; INTRAVENOUS EVERY 8 HOURS PRN
Status: DISCONTINUED | OUTPATIENT
Start: 2019-08-07 | End: 2019-08-10

## 2019-08-07 RX ORDER — ACETAMINOPHEN 325 MG/1
650 TABLET ORAL EVERY 6 HOURS PRN
Status: DISCONTINUED | OUTPATIENT
Start: 2019-08-07 | End: 2019-08-10

## 2019-08-07 RX ORDER — ATORVASTATIN CALCIUM 10 MG/1
10 TABLET, FILM COATED ORAL NIGHTLY
Status: DISCONTINUED | OUTPATIENT
Start: 2019-08-07 | End: 2019-08-10

## 2019-08-07 RX ORDER — METOPROLOL TARTRATE 50 MG/1
50 TABLET, FILM COATED ORAL
Status: DISCONTINUED | OUTPATIENT
Start: 2019-08-07 | End: 2019-08-09

## 2019-08-07 RX ORDER — SODIUM CHLORIDE 9 MG/ML
INJECTION, SOLUTION INTRAVENOUS CONTINUOUS
Status: ACTIVE | OUTPATIENT
Start: 2019-08-07 | End: 2019-08-07

## 2019-08-07 RX ORDER — SODIUM CHLORIDE 9 MG/ML
INJECTION, SOLUTION INTRAVENOUS CONTINUOUS
Status: DISCONTINUED | OUTPATIENT
Start: 2019-08-07 | End: 2019-08-10

## 2019-08-07 RX ORDER — MIRTAZAPINE 15 MG/1
15 TABLET, FILM COATED ORAL NIGHTLY
Status: DISCONTINUED | OUTPATIENT
Start: 2019-08-07 | End: 2019-08-10

## 2019-08-07 RX ORDER — HEPARIN SODIUM AND DEXTROSE 10000; 5 [USP'U]/100ML; G/100ML
12 INJECTION INTRAVENOUS ONCE
Status: COMPLETED | OUTPATIENT
Start: 2019-08-07 | End: 2019-08-07

## 2019-08-07 NOTE — DIETARY MALNUTRITION NOTE
[facility]    NUTRITION INITIAL ASSESSMENT    Pt meets malnutrition criteria.     CRITERIA FOR MALNUTRITION DIAGNOSIS:  Criteria for non-severe malnutrition diagnosis: acute illness/injury related to wt loss 1-2% in 1 week, energy intake less than 75% NUTRITION:  Diet: clear liquid  Oral Supplements: Ensure Enlive tid once diet advances    FOOD/NUTRITION RELATED HISTORY:  Appetite: Poor  Intake: 50%  Intake Meeting Needs: No, but supplements to maximize  Food Allergies: No   Cultural/Ethnic/Religi

## 2019-08-07 NOTE — PROGRESS NOTES
[facility]  Progress Note     Sarah Gravely Patient Status:  Inpatient    1945 MRN [id number]   [facility] 2NE-A Attending Chrystal Oppenheim, MD   Hosp Day # 0 PCP Ebony Evans MD     Chief Complaint: AMS  S:  noel let point for obstruction  1. Bowel rest, IVF  2. PNA- suspect aspiration  1. IV abx and wean O2   3. Near syncope with elevated troponin- suspect demand ischemia and not ACS  1. Cardiology following and started on heparin due to GI issues  4.  Pelvic lytic les

## 2019-08-07 NOTE — PLAN OF CARE
Patient is alert and oriented x4, forgetful, poor judgement. AF on tele. Oxygenation is 100% on RA. L sounds clear-diminished. VSS. Patient denies chest pain, shortness of breath, palpitations. Denies pain. Abdomen distended. Bowel sounds active.  Patient is devices as appropriate  - Consider OT/PT consult to assist with strengthening/mobility  - Encourage toileting schedule  Outcome: Progressing     Problem: RESPIRATORY - ADULT  Goal: Achieves optimal ventilation and oxygenation  Description  INTERVENTIONS:

## 2019-08-07 NOTE — HISTORICAL OFFICE NOTE
Cynthia Elder MD - 2019 10:45 AM CDT  Formatting of this note might be different from the original.  [facility]  Clinic Note    Marisela Freitas  : 1945  PCP: Yahaira Porras MD    Reason for follow-up:  Chief Complain rate ranging between 34 and 93 bpm there there were rare PVCs no other prognostically significant arrhythmia no significant pauses or symptoms reported.     Follow-up 2D echo with Doppler in March 2019 she was stable and showed LVEF of 50 to 55% with well-f fibrillation or flutter. But subsequently he developed atrial fibrillation which was found incidentally during echo imaging in 2018. Subsequent Holter proved all times atrial fibrillation in October 2018. Patient was initiated on anticoagulation in 2018. 05/2008     Family Hx:  Family History   Problem Relation Age of Onset   • Coronary Artery Disease Neg Hx   premature CAD   • Aneurysm Neg Hx   AAA     Social Hx:  he reports that he quit smoking about 17 years ago.  He has a 20.00 pack-year smoking histo soft, non tender  Extremities: intact pulses, no edema  Neuro: alert and oriented  Musculoskeletal: normal gait, normal tone  Skin: warm, dry  Psychiatric: normal affect    Labs:  No visits with results within 3 Month(s) from this visit.    Latest known vis stroke with atrial fibrillation. 7. F/u with me in 9 months. All d/w the patient in detail. Please send copy to PCP.     Jeet Figueroa MD  05/21/19    Electronically signed by Jeet Figueroa MD at 05/21/2019 11:37 AM CDT

## 2019-08-07 NOTE — PHYSICAL THERAPY NOTE
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  Research supports that patients with this level of impairment may be safe for home without therapy services

## 2019-08-07 NOTE — CONSULTS
[facility]  Cardiology Consultation    Fransisca Blevins Patient Status:  Inpatient    1945 MRN [id number]   [facility] 2NE-A Attending Naya Andrews MD   Hosp Day # 0 PCP Ken Butler MD     Reason for Consultation:  Lala Capellan Facility-Administered Medications:   •  0.9% NaCl infusion, , Intravenous, Continuous  •  acetaminophen (TYLENOL) tab 650 mg, 650 mg, Oral, Q6H PRN  •  ondansetron HCl (ZOFRAN) injection 4 mg, 4 mg, Intravenous, Q6H PRN  •  Metoclopramide HCl (REGLAN) inje carotids: no bruits; no tm; mouth moist. Sp neck surgery. Cardiac: irregular rate and rhythm. S1, S2 normal. no pericardial rub, S3. 2/6 luis right base  Lungs: decreased bs left base. Abdomen: Soft, non-tender. BS a bit hyperactive.  No masses  Extremi

## 2019-08-07 NOTE — CM/SW NOTE
Patient was screened during rounds, chart reviewed; received order for home health HEATHER garg recommending: home. Page to [facility] liaison to offer choice/services.     Current living situation:  Type of Home: House   Home Layout: (Split level)  Stairs

## 2019-08-07 NOTE — ED PROVIDER NOTES
Patient Seen in: [facility] Emergency Department    History   Patient presents with:  Nausea/Vomiting/Diarrhea (gastrointestinal)    Stated Complaint: NVD    Pleasant 74year-old in the emergency department with nausea vomiting and diarrhea and gene History    Tobacco Use      Smoking status: Former Smoker        Packs/day: 1.00        Types: Cigarettes        Quit date: 2006        Years since quittin.9      Smokeless tobacco: Never Used    Alcohol use: Yes      Comment: socially    Drug us Total 2.2 (*)     Albumin 3.0 (*)     A/G Ratio 0.8 (*)     All other components within normal limits   TROPONIN I - Abnormal; Notable for the following components:    Troponin 0.273 (*)     All other components within normal limits   PROTHROMBIN TIME (PT) COMPARISON:  [facility] , XR CHEST AP PORTABLE  (CPT=71045), 1/10/2019, 11:47. [facility] , XR CHEST PA + LAT CHEST (CPT=71020), 8/22/2017, 10:14. INDICATIONS:  NVD  PATIENT STATED HISTORY: (As transcribed by Technologist)  Patient complains of weakness and nvd. discrete transition point is not clearly identified. There is also gas and fluid noted within the colon. The overall appearance is somewhat nonspecific. An ileus may be possible. No evidence of free intraperitoneal air.   Urinary bladder is moderately d 8/6/2019 Unknown

## 2019-08-07 NOTE — H&P
[facility]  History and Physical     Lolly Jovanna Patient Status:  Emergency    1945 MRN [id number]   Location [address] [facility] Attending Doreen Monique MD   Hosp Day # 0 PCP Ignacio Gaming MD     Chief Complaint that he quit smoking about 12 years ago. His smoking use included cigarettes. He smoked 1.00 pack per day. He has never used smokeless tobacco. He reports that he drinks alcohol. He reports that he does not use drugs.     Family History: No family history o Recent Labs   Lab 08/06/19  2136   *   BUN 29*   CREATSERUM 1.13   GFRAA 73   GFRNAA 63   CA 8.8   ALB 3.0*   *   K 3.6   CL 98   CO2 30.0   ALKPHO 100   AST 19   ALT 21   BILT 2.2*   TP 6.8       Estimated Creatinine Clearance: 49.3 mL/

## 2019-08-07 NOTE — PLAN OF CARE
Report received from [address] [place] [address]. Assumed care of Pt. At [phone]. Pt. Is Axox4 and on room air with O2 sat of 94%. Pt. Has hoarse speech d/t hx of throat cancer and radiation. Pt. Has diminished bilateral breath sounds. Pt. Denies shortness of breath.  Pt. H ordered  - Initiate emergency measures for life threatening arrhythmias  - Monitor electrolytes and administer replacement therapy as ordered  Outcome: Progressing     Problem: SAFETY ADULT - FALL  Goal: Free from fall injury  Description  INTERVENTIONS:

## 2019-08-08 ENCOUNTER — APPOINTMENT (OUTPATIENT)
Dept: GENERAL RADIOLOGY | Facility: HOSPITAL | Age: 74
DRG: 177 | End: 2019-08-08
Attending: PHYSICIAN ASSISTANT
Payer: MEDICARE

## 2019-08-08 LAB
APTT PPP: 63.5 SECONDS (ref 25.4–36.1)
ATRIAL RATE: 101 BPM
GLUCOSE BLD-MCNC: 120 MG/DL (ref 70–99)
PLATELET # BLD AUTO: 109 10(3)UL (ref 150–450)
Q-T INTERVAL: 324 MS
QRS DURATION: 94 MS
QTC CALCULATION (BEZET): 422 MS
R AXIS: 48 DEGREES
T AXIS: 64 DEGREES
VENTRICULAR RATE: 102 BPM

## 2019-08-08 PROCEDURE — 72190 X-RAY EXAM OF PELVIS: CPT | Performed by: PHYSICIAN ASSISTANT

## 2019-08-08 PROCEDURE — 99233 SBSQ HOSP IP/OBS HIGH 50: CPT | Performed by: HOSPITALIST

## 2019-08-08 PROCEDURE — 99232 SBSQ HOSP IP/OBS MODERATE 35: CPT | Performed by: INTERNAL MEDICINE

## 2019-08-08 RX ORDER — LOPERAMIDE HYDROCHLORIDE 2 MG/1
2 CAPSULE ORAL 4 TIMES DAILY PRN
Status: DISCONTINUED | OUTPATIENT
Start: 2019-08-08 | End: 2019-08-10

## 2019-08-08 NOTE — PROGRESS NOTES
[facility]  Cardiology Progress Note    Subjective:  No chest pain or shortness of breath.     Objective:  /81 (BP Location: Right arm)   Pulse 96   Temp 97.8 °F (36.6 °C) (Oral)   Resp 18   Wt 142 lb 6.7 oz (64.6 kg)   SpO2 97%   BMI 20.43 kg/m² ventricle: The cavity size was at the upper limits of normal.     Systolic function was normal.  7. Right atrium: The atrium was mildly dilated. 8. Pulmonary arteries: Systolic pressure was moderately increased, estimated     to be 52mm Hg.   Impressions:

## 2019-08-08 NOTE — PROGRESS NOTES
[facility]  Progress Note     Jw Boston Patient Status:  Inpatient    1945 MRN [id number]   [facility] 2NE-A Attending Yenny Ortega MD   Hosp Day # 1 PCP Elian Mcculre MD     Chief Complaint: AMS  S:  Feeling mu sign of transition point for obstruction  1. Start diet  2. cont IVF  2. PNA- suspect aspiration  1. IV abx and wean O2   3. Near syncope with elevated troponin- suspect demand ischemia and not ACS  1.  Cardiology following and started on heparin due to GI

## 2019-08-08 NOTE — PLAN OF CARE
Assumed care of Pt. At [address] . Pt. Is Axox4 and on room air with O2 sat of 95%. Pt. Has poor judgement and safety awareness. Pt. Has hoarse speech. Pt. Has diminished bilateral breath sounds. Pt. Denies shortness of breath.  Pt. Has chronic Afib with a HR of cognitive and physical deficits and behaviors that affect risk of falls.   - [place] fall precautions as indicated by assessment.  - Educate pt/family on patient safety including physical limitations  - Instruct pt to call for assistance with activity bas Plan goals for specific interventions   Outcome: Progressing  Goal: Patient/Family Short Term Goal  Description  Patient's Short Term Goal: Patient will no longer experience nausea and diarrhea.     Interventions:   - - Assess patient  - Monitor vital signs

## 2019-08-08 NOTE — PLAN OF CARE
A FIB controlled rate w/ heparin drip per acs /a fib protocol  Orthostatics q 8 hours recent is positive patient deny dizziness w/ getting up in chair  Up w/ 1 assisst w/ walker  Problem: CARDIOVASCULAR - ADULT  Goal: Absence of cardiac arrhythmias or at b oxygenation  Description  INTERVENTIONS:  - Assess for changes in respiratory status  - Assess for changes in mentation and behavior  - Position to facilitate oxygenation and minimize respiratory effort  - Oxygen supplementation based on oxygen saturation

## 2019-08-08 NOTE — PLAN OF CARE
Pt temp 100.5 ~5pm.  Recheck x1 hr ltr 99.5. Pt is asymptomatic. States earlier this afternoon he had the chills and has been sitting under lots of blankets. He feels warm now, but not sick. Dr. Ratna Alcaraz notified.

## 2019-08-08 NOTE — PROGRESS NOTES
08/08/19 0547 08/08/19 0549 08/08/19 0551   Vital Signs   /70 120/77 128/81   BP Location Left arm Left arm Left arm   BP Method Automatic Automatic Automatic   Patient Position Lying Sitting Standing   AM Ortho's

## 2019-08-08 NOTE — CONSULTS
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 reports that he quit smoking about 12 years ago.  His intact. EXTREMITIES: Without edema.       Laboratory Data:  Lab Results   Component Value Date    .0 08/08/2019    PTT 63.5 08/08/2019   CBC 8/7/19: WBC 6.4, Hgb 13.4, HCT 41.5, Platelet count 909  Platelet count 6/8/34: 109    BUN/creat 8/7/19: 24/ contracture of left hand     Community acquired pneumonia of left lung     Community acquired pneumonia of left lung, unspecified part of lung     Hemoptysis     Anticoagulated     Chronic atrial fibrillation ([facility] [address]     Pure hypercholesterolemia     Acquire

## 2019-08-08 NOTE — HOME CARE LIAISON
Attempted to meet with patient regarding home health, however patient is occupied with nursing staff. Will f/u at a later time. 1230: Met with patient and his spouse at the bedside. He is declining home health services at this time.  Traci FOSTER made aware

## 2019-08-08 NOTE — CM/SW NOTE
08/08/19 1340   Discharge disposition   Expected discharge disposition Home or Self   Name of [address]   Patient Refuses Rehab Services Yes   patient declines home health services.     Sami Sandy RN,   Phone [phone]

## 2019-08-09 LAB
APTT PPP: 60.7 SECONDS (ref 25.4–36.1)
ATRIAL RATE: 93 BPM
PLATELET # BLD AUTO: 86 10(3)UL (ref 150–450)
Q-T INTERVAL: 326 MS
QRS DURATION: 96 MS
QTC CALCULATION (BEZET): 428 MS
R AXIS: -33 DEGREES
T AXIS: 59 DEGREES
VENTRICULAR RATE: 104 BPM

## 2019-08-09 PROCEDURE — 99232 SBSQ HOSP IP/OBS MODERATE 35: CPT | Performed by: INTERNAL MEDICINE

## 2019-08-09 PROCEDURE — 99232 SBSQ HOSP IP/OBS MODERATE 35: CPT | Performed by: HOSPITALIST

## 2019-08-09 RX ORDER — METOPROLOL TARTRATE 100 MG/1
100 TABLET ORAL
Status: DISCONTINUED | OUTPATIENT
Start: 2019-08-09 | End: 2019-08-10

## 2019-08-09 NOTE — PROGRESS NOTES
· [facility] Cardiology      Subjective:  Up in chair, tolerating diet.   No chest pain or dyspnea    Objective:  148/72  Afebrile  AFib 90s  I/O  incomplete    Neuro: awake/alert  HEENT: no JVD  Cardiac: S1 S2 irregular 2/6 systolic murmur  Lungs: basila

## 2019-08-09 NOTE — OCCUPATIONAL THERAPY NOTE
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

## 2019-08-09 NOTE — PLAN OF CARE
Heparin gtt infusing per protocol, next PTT in AM  Xray of pelvis completed  IVF infusing as ordered  Antibiotics scheduled tonight, see MAR- both compatible with heparin IV  A/O x 4, hard of hearing  Room air  Afib, rate 100's at rest- eliquis on hold  Fr

## 2019-08-09 NOTE — PROGRESS NOTES
[facility]  Urology Progress Note    Salome Ortega Patient Status:  Inpatient    1945 MRN [id number]   [facility] 2NE-A Attending Edelmira Baltazar MD   Hosp Day # 2 PCP Isrrael Herring MD     Subjective:  Salome Ortega is a(n)

## 2019-08-09 NOTE — PHYSICAL THERAPY NOTE
PHYSICAL THERAPY TREATMENT NOTE - INPATIENT    Room Number: 4291/7622-L     Session: 1   Number of Visits to Meet Established Goals: 3    Presenting Problem: Diarrhea    Problem List  Principal Problem:    Diarrhea  Active Problems:    Chronic atrial fibr -   Moving to and from a bed to a chair (including a wheelchair)?: None   -   Need to walk in hospital room?: None   -   Climbing 3-5 steps with a railing?: A Little       AM-PAC Score:  Raw Score: 23   Approx Degree of Impairment: 11.2%   Standardized S ascend/descend 6 stairs with use of a handrail with SBA - modified goal 8/9/2019 d/t IV line managment   Goal #5     Goal #6     Goal Comments: Goals established on 8/7/2019 - all goals achieved 8/9/2019

## 2019-08-09 NOTE — PROGRESS NOTES
[facility]PITAL[facility]  Progress Note     Tati Sanchez Patient Status:  Inpatient    1945 MRN [id number]   [facility] 2NE-A Attending Silvia Mansfield MD   Hosp Day # 2 PCP Margret Berg MD     Chief Complaint: AMS  S:  Feeling mu improved. Ileus and distention on exam- no sign of transition point for obstruction  1. Advance diet  2. PNA- suspect aspiration  1. IV abx and off O2   3. Near syncope with elevated troponin- suspect demand ischemia and not ACS  1.  Cardiology following an

## 2019-08-09 NOTE — PLAN OF CARE
Pt received at 0730. Alert and oriented x4. Saturating well on RA. Afib on tele with rates controlled. PIV patent. Heparin infusing per protocol. Denies pain or discomfort. Will continue to monitor.      Problem: CARDIOVASCULAR - ADULT  Goal: Maintains op oxygenation  Description  INTERVENTIONS:  - Assess for changes in respiratory status  - Assess for changes in mentation and behavior  - Position to facilitate oxygenation and minimize respiratory effort  - Oxygen supplementation based on oxygen saturation

## 2019-08-10 VITALS
DIASTOLIC BLOOD PRESSURE: 92 MMHG | SYSTOLIC BLOOD PRESSURE: 141 MMHG | RESPIRATION RATE: 18 BRPM | BODY MASS INDEX: 20 KG/M2 | WEIGHT: 142.44 LBS | HEART RATE: 81 BPM | TEMPERATURE: 98 F | OXYGEN SATURATION: 98 %

## 2019-08-10 LAB
ANION GAP SERPL CALC-SCNC: 9 MMOL/L (ref 0–18)
APTT PPP: 47.2 SECONDS (ref 25.4–36.1)
BUN BLD-MCNC: 16 MG/DL (ref 7–18)
BUN/CREAT SERPL: 15.7 (ref 10–20)
CALCIUM BLD-MCNC: 8.6 MG/DL (ref 8.5–10.1)
CHLORIDE SERPL-SCNC: 109 MMOL/L (ref 98–112)
CO2 SERPL-SCNC: 22 MMOL/L (ref 21–32)
CREAT BLD-MCNC: 1.02 MG/DL (ref 0.7–1.3)
DEPRECATED RDW RBC AUTO: 51.3 FL (ref 35.1–46.3)
ERYTHROCYTE [DISTWIDTH] IN BLOOD BY AUTOMATED COUNT: 14.1 % (ref 11–15)
GLUCOSE BLD-MCNC: 75 MG/DL (ref 70–99)
HCT VFR BLD AUTO: 37.6 % (ref 39–53)
HGB BLD-MCNC: 12.4 G/DL (ref 13–17.5)
MCH RBC QN AUTO: 32.8 PG (ref 26–34)
MCHC RBC AUTO-ENTMCNC: 33 G/DL (ref 31–37)
MCV RBC AUTO: 99.5 FL (ref 80–100)
OSMOLALITY SERPL CALC.SUM OF ELEC: 290 MOSM/KG (ref 275–295)
PLATELET # BLD AUTO: 107 10(3)UL (ref 150–450)
POTASSIUM SERPL-SCNC: 3.8 MMOL/L (ref 3.5–5.1)
RBC # BLD AUTO: 3.78 X10(6)UL (ref 3.8–5.8)
SODIUM SERPL-SCNC: 140 MMOL/L (ref 136–145)
WBC # BLD AUTO: 5.4 X10(3) UL (ref 4–11)

## 2019-08-10 PROCEDURE — 99239 HOSP IP/OBS DSCHRG MGMT >30: CPT | Performed by: HOSPITALIST

## 2019-08-10 PROCEDURE — 99232 SBSQ HOSP IP/OBS MODERATE 35: CPT | Performed by: CLINICAL NURSE SPECIALIST

## 2019-08-10 RX ORDER — LEVOFLOXACIN 500 MG/1
500 TABLET, FILM COATED ORAL DAILY
Qty: 5 TABLET | Refills: 0 | Status: SHIPPED | OUTPATIENT
Start: 2019-08-10 | End: 2019-08-15

## 2019-08-10 NOTE — PLAN OF CARE
Diet advanced to low fiber today, patient tolerated dinner  Imodium administered prn as requested this evening for gas  Heparin gtt infusing per protocol, next PTT 8/20 AM  Plan to restart eliquis 8/10 per MD notes  Rocephin IVPB for PNA- see MAR  A/O x 4,

## 2019-08-10 NOTE — PROGRESS NOTES
[facility]  Progress Note     Ilean Em Patient Status:  Inpatient    1945 MRN [id number]   [facility] 2NE-A Attending Fahad Macario MD   Hosp Day # 3 PCP Bridger Chaudhari MD     Chief Complaint: AMS    S:  Feels we PLAN:     1. PNA- suspect aspiration  1. IV abx and off O2   2.  Near syncope with elevated troponin- suspect demand ischemia and not ACS  3. Pelvic lytic lesions- incidental.  Upon further discussion he had prostate \"procedure 11 years ago for benign pros

## 2019-08-10 NOTE — PROGRESS NOTES
[facility] Cardiology Progress Note    Subjective: Formed stool last pm. Ate some mashed potatoes last evening. Overall much better. Able to walk in chao yesterday.      Objective:  BP (!) 144/96 (BP Location: Left arm)   Pulse 114   Temp 97.8 °F (36.6 °C) (O

## 2019-08-10 NOTE — PLAN OF CARE
Pt received at 0730. Alert and oriented x4. Saturating well on RA. Afib on tele with rates controlled. PIV patent. Heparin gtt stopped following Eliquis. Denies pain or discomfort. Hoping to be d/zoe home today. Will continue to monitor.      Problem: CAR Achieves optimal ventilation and oxygenation  Description  INTERVENTIONS:  - Assess for changes in respiratory status  - Assess for changes in mentation and behavior  - Position to facilitate oxygenation and minimize respiratory effort  - Oxygen supplement

## 2019-08-10 NOTE — PROGRESS NOTES
NURSING DISCHARGE NOTE    Discharged Home via Ambulatory. Accompanied by Spouse  Belongings Taken by patient/family. Pt PIV removed with catheter intact and tele discontinued. Pt received discharge instructions and follow up information.  Medication

## 2019-08-11 NOTE — DISCHARGE SUMMARY
[facility]PITAL[facility]  DISCHARGE SUMMARY     Angela Jon Patient Status:  Inpatient    1945 MRN [id number]   [facility] 2NE-A Attending No att. providers found   Hosp Day # 3 PCP Bella Martínez MD     Date of Admission: 2019  Da due to demand ischemia. Patient noted to have lytic lesions on pelvic CT/xray and was evaluated by urology who will follow as an outpatient. SPEP pending, patient referred to hematology as an outpatient.     Lace+ Score: 61  59-90 High Risk  29-58 Medium Ri NUMBER [phone]    Vernon Rocha MD  [address] [facility]  [place] [address]  [phone]      please call to see in 2-3 weeks    Flako Wilder MD  [address] [place] [address] Dr [place] [address] [place] [address]

## 2019-08-13 LAB
ALBUMIN SERPL-MCNC: 3.78 G/DL (ref 3.1–4.5)
ALBUMIN/GLOB SERPL: 1.25 {RATIO}
ALPHA1 GLOB SERPL ELPH-MCNC: 0.33 G/DL (ref 0.1–0.3)
ALPHA2 GLOB SERPL ELPH-MCNC: 0.9 G/DL (ref 0.6–1)
B-GLOBULIN SERPL ELPH-MCNC: 0.74 G/DL (ref 0.7–1.2)
GAMMA GLOB SERPL ELPH-MCNC: 1.04 G/DL (ref 0.6–1.6)
KAPPA FREE LIGHT CHAIN: 2.02 MG/DL (ref 0.33–1.94)
KAPPA/LAMBDA FLC RATIO: 1.73 (ref 0.26–1.65)
LAMBDA FREE LIGHT CHAIN: 1.17 MG/DL (ref 0.57–2.63)
M PROTEIN MFR SERPL ELPH: 6.8 G/DL (ref 6.4–8.2)

## 2019-11-08 ENCOUNTER — DOCUMENTATION (OUTPATIENT)
Dept: CARDIOLOGY | Age: 74
End: 2019-11-08

## 2019-11-12 ENCOUNTER — APPOINTMENT (OUTPATIENT)
Dept: CT IMAGING | Facility: HOSPITAL | Age: 74
DRG: 086 | End: 2019-11-12
Attending: EMERGENCY MEDICINE
Payer: MEDICARE

## 2019-11-12 ENCOUNTER — HOSPITAL ENCOUNTER (INPATIENT)
Facility: HOSPITAL | Age: 74
LOS: 1 days | Discharge: HOME | DRG: 086 | End: 2019-11-13
Attending: EMERGENCY MEDICINE | Admitting: INTERNAL MEDICINE
Payer: MEDICARE

## 2019-11-12 ENCOUNTER — HOSPITAL ENCOUNTER (EMERGENCY)
Facility: HOSPITAL | Age: 74
Discharge: HOME | DRG: 086 | End: 2019-11-12
Attending: EMERGENCY MEDICINE
Payer: MEDICARE

## 2019-11-12 VITALS
BODY MASS INDEX: 20.76 KG/M2 | SYSTOLIC BLOOD PRESSURE: 145 MMHG | DIASTOLIC BLOOD PRESSURE: 97 MMHG | TEMPERATURE: 98 F | HEART RATE: 82 BPM | HEIGHT: 70 IN | WEIGHT: 145 LBS | RESPIRATION RATE: 16 BRPM | OXYGEN SATURATION: 93 %

## 2019-11-12 DIAGNOSIS — I60.9 SUBARACHNOID HEMORRHAGE (HCC): Primary | ICD-10-CM

## 2019-11-12 DIAGNOSIS — S00.83XA CONTUSION OF FACE, INITIAL ENCOUNTER: Primary | ICD-10-CM

## 2019-11-12 DIAGNOSIS — I48.91 ATRIAL FIBRILLATION AND FLUTTER (HCC): ICD-10-CM

## 2019-11-12 DIAGNOSIS — I48.92 ATRIAL FIBRILLATION AND FLUTTER (HCC): ICD-10-CM

## 2019-11-12 DIAGNOSIS — R31.9 HEMATURIA, UNSPECIFIED TYPE: ICD-10-CM

## 2019-11-12 PROCEDURE — 96361 HYDRATE IV INFUSION ADD-ON: CPT

## 2019-11-12 PROCEDURE — 80053 COMPREHEN METABOLIC PANEL: CPT | Performed by: EMERGENCY MEDICINE

## 2019-11-12 PROCEDURE — 96365 THER/PROPH/DIAG IV INF INIT: CPT

## 2019-11-12 PROCEDURE — 30283B1 TRANSFUSION OF NONAUTOLOGOUS 4-FACTOR PROTHROMBIN COMPLEX CONCENTRATE INTO VEIN, PERCUTANEOUS APPROACH: ICD-10-PCS | Performed by: HOSPITALIST

## 2019-11-12 PROCEDURE — 87086 URINE CULTURE/COLONY COUNT: CPT | Performed by: EMERGENCY MEDICINE

## 2019-11-12 PROCEDURE — 99223 1ST HOSP IP/OBS HIGH 75: CPT | Performed by: INTERNAL MEDICINE

## 2019-11-12 PROCEDURE — 99284 EMERGENCY DEPT VISIT MOD MDM: CPT

## 2019-11-12 PROCEDURE — 99223 1ST HOSP IP/OBS HIGH 75: CPT | Performed by: NEUROLOGICAL SURGERY

## 2019-11-12 PROCEDURE — 71260 CT THORAX DX C+: CPT | Performed by: EMERGENCY MEDICINE

## 2019-11-12 PROCEDURE — 85610 PROTHROMBIN TIME: CPT | Performed by: EMERGENCY MEDICINE

## 2019-11-12 PROCEDURE — 99291 CRITICAL CARE FIRST HOUR: CPT | Performed by: INTERNAL MEDICINE

## 2019-11-12 PROCEDURE — 81001 URINALYSIS AUTO W/SCOPE: CPT | Performed by: EMERGENCY MEDICINE

## 2019-11-12 PROCEDURE — 70486 CT MAXILLOFACIAL W/O DYE: CPT | Performed by: EMERGENCY MEDICINE

## 2019-11-12 PROCEDURE — 70450 CT HEAD/BRAIN W/O DYE: CPT | Performed by: EMERGENCY MEDICINE

## 2019-11-12 PROCEDURE — 85025 COMPLETE CBC W/AUTO DIFF WBC: CPT | Performed by: EMERGENCY MEDICINE

## 2019-11-12 PROCEDURE — 74177 CT ABD & PELVIS W/CONTRAST: CPT | Performed by: EMERGENCY MEDICINE

## 2019-11-12 PROCEDURE — 85730 THROMBOPLASTIN TIME PARTIAL: CPT | Performed by: EMERGENCY MEDICINE

## 2019-11-12 RX ORDER — CEPHALEXIN 500 MG/1
500 CAPSULE ORAL EVERY 6 HOURS SCHEDULED
Status: DISCONTINUED | OUTPATIENT
Start: 2019-11-12 | End: 2019-11-12

## 2019-11-12 RX ORDER — METOPROLOL TARTRATE 50 MG/1
75 TABLET, FILM COATED ORAL EVERY MORNING
COMMUNITY
End: 2020-11-28 | Stop reason: CLARIF

## 2019-11-12 RX ORDER — LEVOTHYROXINE SODIUM 0.05 MG/1
50 TABLET ORAL
Status: DISCONTINUED | OUTPATIENT
Start: 2019-11-13 | End: 2019-11-13

## 2019-11-12 RX ORDER — PRAVASTATIN SODIUM 10 MG
10 TABLET ORAL NIGHTLY
Status: DISCONTINUED | OUTPATIENT
Start: 2019-11-12 | End: 2019-11-13

## 2019-11-12 RX ORDER — METOPROLOL TARTRATE 50 MG/1
50 TABLET, FILM COATED ORAL EVERY EVENING
Status: DISCONTINUED | OUTPATIENT
Start: 2019-11-12 | End: 2019-11-13

## 2019-11-12 RX ORDER — SODIUM CHLORIDE 9 MG/ML
INJECTION, SOLUTION INTRAVENOUS CONTINUOUS
Status: DISCONTINUED | OUTPATIENT
Start: 2019-11-12 | End: 2019-11-12

## 2019-11-12 RX ORDER — METOPROLOL TARTRATE 50 MG/1
50 TABLET, FILM COATED ORAL EVERY EVENING
COMMUNITY
End: 2020-11-28 | Stop reason: CLARIF

## 2019-11-12 RX ORDER — CEPHALEXIN 500 MG/1
500 CAPSULE ORAL 4 TIMES DAILY
Qty: 40 CAPSULE | Refills: 0 | Status: ON HOLD | OUTPATIENT
Start: 2019-11-12 | End: 2019-11-12

## 2019-11-12 RX ORDER — ASPIRIN 81 MG/1
TABLET, CHEWABLE ORAL DAILY
COMMUNITY
End: 2019-11-12

## 2019-11-12 NOTE — ED NOTES
1639: ED charge RN called phone number listed for patient and left message to call ED charge RN back regarding CT results.

## 2019-11-12 NOTE — ED NOTES
Spoke with wife on phone and informed of results. Aware of need to return to ED. Stated they would be here within the hour. Pt was on speaker phone with spouse. Verbalized understanding.

## 2019-11-12 NOTE — ED INITIAL ASSESSMENT (HPI)
Pt seen here early this morning, were called at home and told to return for bleeding in his brain and orbital fracture.

## 2019-11-12 NOTE — ED INITIAL ASSESSMENT (HPI)
Pt ambulatory to er cc fell at 1900 Monday denies loc  Spitting up bright red blood x one -history of tonsil cancer w radiation 2010  Blood in urine after fall per spouse    Velvet Awkward onto his car - left facial swelling  Left eye swollen shut -pt denies visual

## 2019-11-12 NOTE — ED PROVIDER NOTES
Patient Seen in: [facility] Emergency Department      History   Patient presents with:  Head Neck Injury (neurologic, musculoskeletal)    Stated Complaint: head injury     HPI    47-year-old male comes to the hospital complaint of having had initial clear, neck supple, no JVD, trachea midline, No LAD  Heart: S1S2 normal. No murmurs, irregular rate and rhythm  Lungs: Clear to auscultation bilaterally  Abdomen: Soft nontender nondistended normal active bowel sounds without rebound, guarding or masses no ANTIBODY SCREEN     EKG    Rate, intervals and axes as noted on EKG Report.   Rate: 71  Rhythm: Atrial Fibrillation  Reading: Agreed              Ct Brain Or Head (26207)    Result Date: 11/12/2019  PROCEDURE:  CT BRAIN OR HEAD (43398)  COMPARISON:  JESSIKA Mariaelena Curran MD on 11/12/2019 at 13:37     Approved by: Brooke Limon MD on 11/12/2019 at 13:42          Ct Brain Or Head (27403)    PROCEDURE:  CT BRAIN OR HEAD ([id number])  COMPARISON:  KATE TO, CT FACIAL BONES (CPT=70486), 11/12/2019, 4:15.   [facility] , [address], [employer] Angie Scott MD on 11/12/2019 at 7:14          Ct Facial Bones (cpt=70486)    Addendum Date: 11/12/2019    ADDENDUM:  There is subtle irregularity in the left lateral orbital wall that may represent a nondisplaced fracture.   This critical result was hematoma over the left orbit is noted. No fracture or dislocation. Agree with preliminary radiology report from [address] [place] [address] radiology.     Dictated by: Henny Ryan MD on 11/12/2019 at 7:03     Approved by: Henny Ryan MD on 11/12/2019 at 7:06 lesion. BILIARY:  No visible dilatation or calcification. PANCREAS:  No lesion, fluid collection, ductal dilatation, or atrophy. SPLEEN:  No enlargement or focal lesion. KIDNEYS:  Markedly atrophic left kidney is again noted.   Multiple cysts in the rig and mortality associated.  The services I provided  were to promote improvement and reduce mortality specifically involving complex record review, complex medical decisions and interventions, and consultations outside the regular procedures and care normall

## 2019-11-12 NOTE — CONSULTS
[facility]  Neurosurgery Consult    Johnnyannamarie Obregonume Patient Status:  Emergency    1945 MRN [id number]   Location [facility] Attending Traci Ortega MD   Hosp Day # 0 PCP Marva Davila MD     [address] [place] [address] pack per day. He has never used smokeless tobacco. He reports current alcohol use. He reports that he does not use drugs.     ALLERGIES:    Compazine [Prochlor*    HALLUCINATION    MEDICATIONS:  (Not in a hospital admission)    Prothrombin Complex Conc Yanira paranasal sinuses is noted. Visualized portions of the mastoid air cells are unremarkable. Soft tissue swelling over the left orbit is noted. IMPRESSION:  There is posttraumatic subarachnoid hemorrhage in left sylvian fissure.   This was not mentioned close monitoring  Dr. Roberta Alfaro to follow       Lisbet Lion [facility]  11/12/2019, 1:46 PM     I have seen and examined this patient and agree with the findings documented above.     Neurologically intact, asymptomatic at this alana

## 2019-11-12 NOTE — ED PROVIDER NOTES
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 Good nontender active range of motion noted  Neurologic:  Mental status as above. Patient moves all extremities with good strength and coordination.          ED Course     Labs Reviewed   COMP METABOLIC PANEL (14) - Abnormal; Notable for the fol anticoagulant, internal injuries such as lung contusion, renal contusion or retroperitoneal hematoma are considered and CT imaging is indicated    Patient closely monitored and treated with IV fluid    CBC showed normal white count with hemoglobin of 11. 6. 0

## 2019-11-12 NOTE — CONSULTS
[facility] Cardiology Consult Note    Angela Jon Patient Status:  Emergency    1945 MRN [id number]   Location [facility] [facility] Attending Marcus Portillo MD   Hosp Day # 0 PCP Bella Martínez MD       HPI:  74year old male wi • Arrhythmia    • Cancer [facility])     sqaumos cell    • Heart disease    • Hypothyroidism    • Prostate enlargement    • Rheumatic fever    • Squamous cell carcinoma      Past Surgical History:   Procedure Laterality Date   • OTHER SURGICAL HISTORY  2010

## 2019-11-12 NOTE — CONSULTS
[facility]  Neurocritical Care Consult Note    Jw Boston Patient Status:  Emergency    1945 MRN [id number]   Location [facility] [facility] Attending Steven Norris MD   Hosp Day # 0 PCP Elian Mcclure MD Once        Review of Systems:     Constitutional:    Denies unusual weight loss or weight gain, fever/chills or night sweats. HEENT:                Denies changes in vision or difficulty swallowing.   Pulm:                    Denies dyspnea, cough, or spu 11/12/2019, 4:15. [facility] , CT, CT BRAIN OR HEAD ([id number]), 8/22/2017, 11:23. INDICATIONS:  FALL, HEAD NJURY, SHOULDER PAIN  TECHNIQUE:  Noncontrast CT scanning is performed through the brain. Dose reduction techniques were used.  Dose information is transmi fracture. This critical result was discussed with Dr. Carley Canales at [facility] hours on 11/12/2019. Read back was performed.    Dictated by: Adriana Aguirre MD on 11/12/2019 at 7:14     Approved by: Adriana Aguirre MD on 11/12/2019 at 7:15          Result was administered to manage and/or prevent neurologic instability. This involved direct patient intervention, complex decision making, and/or extensive discussions with the patient, family, and clinical staff.     Thank you for allowing me to participate in

## 2019-11-13 VITALS
BODY MASS INDEX: 19 KG/M2 | HEART RATE: 79 BPM | RESPIRATION RATE: 24 BRPM | OXYGEN SATURATION: 93 % | DIASTOLIC BLOOD PRESSURE: 59 MMHG | TEMPERATURE: 98 F | WEIGHT: 135.13 LBS | SYSTOLIC BLOOD PRESSURE: 112 MMHG

## 2019-11-13 PROCEDURE — 99233 SBSQ HOSP IP/OBS HIGH 50: CPT | Performed by: INTERNAL MEDICINE

## 2019-11-13 PROCEDURE — 99291 CRITICAL CARE FIRST HOUR: CPT | Performed by: INTERNAL MEDICINE

## 2019-11-13 PROCEDURE — 99231 SBSQ HOSP IP/OBS SF/LOW 25: CPT | Performed by: NURSE PRACTITIONER

## 2019-11-13 PROCEDURE — 99239 HOSP IP/OBS DSCHRG MGMT >30: CPT | Performed by: HOSPITALIST

## 2019-11-13 RX ORDER — HEPARIN SODIUM 5000 [USP'U]/ML
5000 INJECTION, SOLUTION INTRAVENOUS; SUBCUTANEOUS EVERY 12 HOURS SCHEDULED
Status: DISCONTINUED | OUTPATIENT
Start: 2019-11-13 | End: 2019-11-13

## 2019-11-13 NOTE — PROGRESS NOTES
[facility]OSPITAL[facility]  Progress Note     Aron Dejesus Patient Status:  Inpatient    1945 MRN [id number]   [facility] 6NE-A Attending David Rodriguez MD   Hosp Day # 1 PCP Yusef Montejo MD     Chief Complaint: fall     S: Patient repo mg/dL). Recent Labs   Lab 11/12/19 0227 11/12/19  1236   PTP 15.0* 15.8*   INR 1.13* 1.20*       No results for input(s): TROP, CK in the last 168 hours. Imaging: Imaging data reviewed in Epic.     Medications:   • Levothyroxine Sodium  50 mcg O

## 2019-11-13 NOTE — PROGRESS NOTES
[facility]  Neurocritical Care Progress Note     Mani Jeffery Patient Status:  Inpatient    1945 MRN [id number]   [facility] 6NE-A Attending Vinh Briscoe MD   Hosp Day # 1 PCP Fatuma Cruz MD     Subjective on 11/12/2019 at 13:37     Approved by: Mat Chambers MD on 11/12/2019 at 13:42          Lab Review     Recent Labs   Lab 11/12/19  0227 11/12/19  1336 11/13/19  0439    140 140   K 4.6 4.0 4.1    106 105   CO2 30.0 31.0 31.0   * 94 9

## 2019-11-13 NOTE — PROGRESS NOTES
[facility] Cardiology  Progress Note    Millie Plummer Patient Status:  Inpatient    1945 MRN [id number]   [facility] 6NE-A Attending Jun Johnson MD   Hosp Day # 1 PCP Danielle Murphy MD     Subjective: No headache, depression. Normal affect.      Laboratory/Data:    Labs:       Recent Labs   Lab 11/12/19  0227 11/12/19  1236 11/13/19  0439   WBC 7.0 6.2 5.7   HGB 11.6* 10.9* 11.0*   MCV 97.0 95.9 98.6   .0* 98.0* 105.0*   INR 1.13* 1.20*  --        Recent Labs hematoma along the left frontal convexity as well as left preorbital soft tissue hematoma. CONCLUSION: 1. Stable small amount of subarachnoid hemorrhage noted within the left sylvian fissure.  2. Stable hypodensity within right temporal and occipital l preliminary report. Low-density in the posterior right MCA territory is likely sequelae of an interval chronic infarct since prior examination. However, if there is high concern for acute ischemia a followup MRI brain exam may be done.   Subcutaneous hema FACIAL BONES:  No fracture or dislocation is identified. The patient is edentulous. ORBITS:  Hematoma over the left orbit is noted. Diffuse soft tissue swelling in this region is noted. The globes appear to be intact.  CAVERNOUS SINUS:  Symmetric appeara paratracheal lymph node measures 1.8 x 1.4 cm and is stable. NIKKI:  Enlarged right hilar lymph node measures 2.7 x 2.6 cm and is unchanged. CARDIAC:  Enlarged right atrium is stable. PLEURA:  No mass or effusion.   CHEST WALL:  No mass or axillary adenopath Oral, Nightly  metoprolol Tartrate (LOPRESSOR) tab 75 mg, 75 mg, Oral, QAM  Metoprolol Tartrate (LOPRESSOR) tab 50 mg, 50 mg, Oral, QPM        Allergies:    Compazine [Prochlor*    HALLUCINATION      Echo from August 2019:  1. Left ventricle:  The cavity si SAH and he was called back to ER-hematuria resolved and he is stable neurologically with no deficits. No headache. Neurosurgery was consulted and medical approach was recommended. No obvious orbital fracture by CT but left periorbital swelling.   CT of t neurosurgery  -We will not resume baby aspirin  -Continue beta-blocker for rate control of A.  Fib  -Continue statin  -Dietitian consult appreciated  -Patient takes antibiotic for recent UTI -run by hospitalist  -Physical therapy evaluation  -Okay to transf

## 2019-11-13 NOTE — DIETARY MALNUTRITION NOTE
[facility]    NUTRITION ASSESSMENT    Pt meets moderate malnutrition criteria.     CRITERIA FOR MALNUTRITION DIAGNOSIS:  Criteria for non-severe malnutrition diagnosis: chronic illness related to body fat mild depletion and muscle mass mild depletion lb)  08/22/17 : 65.8 kg (145 lb)      NUTRITION:  Diet: Cardiac  Oral Supplements: n/a    FOOD/NUTRITION RELATED HISTORY:  Appetite: Good  Intake: 100%  Intake Meeting Needs: Yes  Food Allergies: No  Cultural/Ethnic/Oriental orthodox Preferences Addresses:  Yes

## 2019-11-13 NOTE — PLAN OF CARE
Assumed care of pt this am, neurologically intact, ecchymotic around left eye, painful to touch. PT/OT evaluated, patient at baseline and ok to go home.  Awaiting neuro surg to see patient

## 2019-11-13 NOTE — OCCUPATIONAL THERAPY NOTE
OCCUPATIONAL THERAPY QUICK EVALUATION - INPATIENT    Room Number: 3065/6430-I  Evaluation Date: 11/13/2019     Type of Evaluation: Initial  Presenting Problem: [facility]    Physician Order: IP Consult to Occupational Therapy  Reason for Therapy:  ADL/IADL Dysfun TOLERANCE                         O2 SATURATIONS                ACTIVITIES OF DAILY LIVING ASSESSMENT  AM-PAC ‘6-Clicks’ Inpatient Daily Activity Short Form   How much help from another person does the patient currently need…  -   Putting on and taking off In this OT eval patient performed all ADL tasks, functional transfers, dynamic reaching and functional mobility safely, without loss of balance, and at supervision level or above.  Patient reports no further questions or concerns about safe return to I/ADL

## 2019-11-13 NOTE — PLAN OF CARE
Neuro exams q1, intact and stable. Controlled AFIB noted on monitors with occasional PACs. SBP within goal of  without intervention. Remains on RA. Bruising to left eye noted. Still need UA, per pt only voids a few times a day normally.  Bed alarm in

## 2019-11-13 NOTE — DISCHARGE SUMMARY
[facility] [place] HOSPITALIST  DISCHARGE SUMMARY     Ratna Landrum Patient Status:  Inpatient    1945 MRN [id number]   [facility] 6NE-A Attending Doug Hernandez MD   [facility] Day # 1 PCP Giselle Kamara MD     Date of Admission: 2019  Date o history of radiation for tonsillar cancer. Also had intermittent hematuria which improved. He was evaluated by PT, OT. Patient was neuro intact and following commands well. He will remain off anticoagulation for 1 week per neurosurgery recs.   Patient i for a visit in 1 week        -----------------------------------------------------------------------------------------------  PATIENT DISCHARGE INSTRUCTIONS: See electronic chart    Reyna Essex, PA    Time spent:  > 30 minutes

## 2019-11-13 NOTE — PLAN OF CARE
Discharge orders rcvd from neuro surg, with order to restart eliquist in 1 week. All discharge instructions given, all questions answered, IV's removed. All personal belongings taken with patient.  Pt escorted to door in wheelchair by transport

## 2019-11-13 NOTE — PLAN OF CARE
Problem: Patient/Family Goals  Goal: Patient/Family Long Term Goal  Description  Patient's Long Term Goal: To prevent future falls     Interventions:  - Use appropriate mobility tool  - Not going out in dangerous conditions  - See additional Care Plan go

## 2019-11-13 NOTE — CM/SW NOTE
11/13/19 1200   Discharge disposition   Expected discharge disposition Home or Self        Per RN, pt ok to dc today. Therapy did evals and no dc planning needs identified.     Alvina Ramires LCSW  /Discharge Planner  [phone];

## 2019-11-13 NOTE — PHYSICAL THERAPY NOTE
PHYSICAL THERAPY QUICK EVALUATION - INPATIENT    Room Number: 9577/8444-H  Evaluation Date: 11/13/2019  Presenting Problem: [facility]  Physician Order: PT Eval and Treat    History of Present Illness: Pt is 74year old male admitted on 11/12/2019 after slippin ASSESSMENT  Rating: (no c/o pain)         RANGE OF MOTION AND STRENGTH ASSESSMENT  Upper extremity ROM and strength are within functional limits     Lower extremity ROM is within functional limits     Lower extremity strength is within functional limits, g forward with outstretched arm while standing           4  4.  object from the floor from a standing position              4  5. Turning to look over left and right shoulders while standing       4  6.  Standing unsupported one foot in front           with slight change in gait velocity, i.e., minor disruption to smooth gait path or uses walking aid. (1) Moderate Impairment: Preforms head turns with moderate change in gait velocity, slows down, staggers but recovers, can continue to walk.   (0)  Severe physical therapy needs. The pt will be discharged from therapy service at this time and is deemed safe for discharge home in terms of functional mobility.     Based on this evaluation, patient's clinical presentation is evolving and overall evaluation comp

## 2019-12-02 ENCOUNTER — OFFICE VISIT (OUTPATIENT)
Dept: OTHER | Facility: HOSPITAL | Age: 74
End: 2019-12-02
Attending: PREVENTIVE MEDICINE

## 2019-12-02 DIAGNOSIS — Z00.00 ANNUAL PHYSICAL EXAM: Primary | ICD-10-CM

## 2020-01-23 ENCOUNTER — TELEPHONE (OUTPATIENT)
Dept: CARDIOLOGY | Age: 75
End: 2020-01-23

## 2020-01-23 DIAGNOSIS — I65.23 ASYMPTOMATIC CAROTID ARTERY STENOSIS, BILATERAL: ICD-10-CM

## 2020-01-23 DIAGNOSIS — E78.00 PURE HYPERCHOLESTEROLEMIA: Primary | ICD-10-CM

## 2020-01-23 RX ORDER — SIMVASTATIN 20 MG
20 TABLET ORAL AT BEDTIME
Qty: 30 TABLET | Refills: 1 | Status: SHIPPED | OUTPATIENT
Start: 2020-01-23 | End: 2020-01-30 | Stop reason: SDUPTHER

## 2020-01-24 ENCOUNTER — HOSPITAL ENCOUNTER (OUTPATIENT)
Dept: LAB | Facility: HOSPITAL | Age: 75
Discharge: HOME | End: 2020-01-24
Attending: INTERNAL MEDICINE
Payer: MEDICARE

## 2020-01-24 LAB
CHOLEST SMN-MCNC: 138 MG/DL (ref ?–200)
HDLC SERPL-MCNC: 51 MG/DL (ref 40–59)
LDLC SERPL CALC-MCNC: 73 MG/DL (ref ?–100)
NONHDLC SERPL-MCNC: 87 MG/DL (ref ?–130)
PATIENT FASTING Y/N/NP: YES
TRIGL SERPL-MCNC: 72 MG/DL (ref 30–149)
VLDLC SERPL CALC-MCNC: 14 MG/DL (ref 0–30)

## 2020-01-24 PROCEDURE — 36415 COLL VENOUS BLD VENIPUNCTURE: CPT | Performed by: INTERNAL MEDICINE

## 2020-01-24 PROCEDURE — 80061 LIPID PANEL: CPT | Performed by: INTERNAL MEDICINE

## 2020-01-30 RX ORDER — SIMVASTATIN 20 MG
20 TABLET ORAL AT BEDTIME
Qty: 90 TABLET | Refills: 3 | Status: SHIPPED | OUTPATIENT
Start: 2020-01-30 | End: 2020-03-27 | Stop reason: SDUPTHER

## 2020-02-11 ENCOUNTER — CASE MANAGEMENT (OUTPATIENT)
Dept: CARE COORDINATION | Age: 75
End: 2020-02-11

## 2020-02-14 DIAGNOSIS — I42.0 DILATED CARDIOMYOPATHY (CMD): ICD-10-CM

## 2020-02-14 RX ORDER — METOPROLOL TARTRATE 50 MG/1
TABLET, FILM COATED ORAL
Qty: 220 TABLET | Refills: 0 | Status: SHIPPED | OUTPATIENT
Start: 2020-02-14 | End: 2020-02-25 | Stop reason: SDUPTHER

## 2020-02-17 ENCOUNTER — CASE MANAGEMENT (OUTPATIENT)
Dept: CARE COORDINATION | Age: 75
End: 2020-02-17

## 2020-02-25 ENCOUNTER — OFFICE VISIT (OUTPATIENT)
Dept: CARDIOLOGY | Age: 75
End: 2020-02-25

## 2020-02-25 VITALS
SYSTOLIC BLOOD PRESSURE: 90 MMHG | DIASTOLIC BLOOD PRESSURE: 52 MMHG | HEIGHT: 69 IN | BODY MASS INDEX: 19.98 KG/M2 | WEIGHT: 134.9 LBS | HEART RATE: 75 BPM

## 2020-02-25 DIAGNOSIS — N18.30 CHRONIC RENAL IMPAIRMENT, STAGE 3 (MODERATE) (CMD): ICD-10-CM

## 2020-02-25 DIAGNOSIS — Z98.890 H/O MITRAL VALVE REPAIR: ICD-10-CM

## 2020-02-25 DIAGNOSIS — Z87.891 HISTORY OF SMOKING: ICD-10-CM

## 2020-02-25 DIAGNOSIS — Z79.01 CURRENT USE OF LONG TERM ANTICOAGULATION: ICD-10-CM

## 2020-02-25 DIAGNOSIS — I25.10 CORONARY ARTERY DISEASE INVOLVING NATIVE CORONARY ARTERY OF NATIVE HEART WITHOUT ANGINA PECTORIS: Primary | ICD-10-CM

## 2020-02-25 DIAGNOSIS — Z95.2 HISTORY OF PROSTHETIC AORTIC VALVE REPLACEMENT: ICD-10-CM

## 2020-02-25 DIAGNOSIS — Z95.3 HISTORY OF AORTIC VALVE REPLACEMENT WITH BIOPROSTHETIC VALVE: ICD-10-CM

## 2020-02-25 DIAGNOSIS — I42.0 DILATED CARDIOMYOPATHY (CMD): ICD-10-CM

## 2020-02-25 DIAGNOSIS — I48.20 CHRONIC ATRIAL FIBRILLATION (CMD): ICD-10-CM

## 2020-02-25 DIAGNOSIS — I65.23 ASYMPTOMATIC CAROTID ARTERY STENOSIS, BILATERAL: ICD-10-CM

## 2020-02-25 DIAGNOSIS — E78.00 PURE HYPERCHOLESTEROLEMIA: ICD-10-CM

## 2020-02-25 PROCEDURE — 99215 OFFICE O/P EST HI 40 MIN: CPT | Performed by: INTERNAL MEDICINE

## 2020-02-25 RX ORDER — METOPROLOL TARTRATE 50 MG/1
TABLET, FILM COATED ORAL
Qty: 220 TABLET | Refills: 3 | Status: SHIPPED | OUTPATIENT
Start: 2020-02-25 | End: 2020-11-30 | Stop reason: SDUPTHER

## 2020-02-25 SDOH — HEALTH STABILITY: PHYSICAL HEALTH: ON AVERAGE, HOW MANY MINUTES DO YOU ENGAGE IN EXERCISE AT THIS LEVEL?: 60 MIN

## 2020-02-25 SDOH — HEALTH STABILITY: MENTAL HEALTH: HOW OFTEN DO YOU HAVE 6 OR MORE DRINKS ON ONE OCCASION?: LESS THAN MONTHLY

## 2020-02-25 SDOH — HEALTH STABILITY: PHYSICAL HEALTH: ON AVERAGE, HOW MANY DAYS PER WEEK DO YOU ENGAGE IN MODERATE TO STRENUOUS EXERCISE (LIKE A BRISK WALK)?: 7 DAYS

## 2020-02-25 ASSESSMENT — PATIENT HEALTH QUESTIONNAIRE - PHQ9
SUM OF ALL RESPONSES TO PHQ9 QUESTIONS 1 AND 2: 0
SUM OF ALL RESPONSES TO PHQ9 QUESTIONS 1 AND 2: 0
2. FEELING DOWN, DEPRESSED OR HOPELESS: NOT AT ALL
1. LITTLE INTEREST OR PLEASURE IN DOING THINGS: NOT AT ALL

## 2020-03-27 ENCOUNTER — TELEPHONE (OUTPATIENT)
Dept: CARDIOLOGY | Age: 75
End: 2020-03-27

## 2020-03-27 RX ORDER — SIMVASTATIN 20 MG
20 TABLET ORAL AT BEDTIME
Qty: 90 TABLET | Refills: 3 | Status: SHIPPED | OUTPATIENT
Start: 2020-03-27 | End: 2020-11-30 | Stop reason: SDUPTHER

## 2020-04-06 ENCOUNTER — TELEPHONE (OUTPATIENT)
Dept: CARDIOLOGY | Age: 75
End: 2020-04-06

## 2020-11-20 ENCOUNTER — OFFICE VISIT (OUTPATIENT)
Dept: OTHER | Facility: HOSPITAL | Age: 75
End: 2020-11-20
Attending: PREVENTIVE MEDICINE

## 2020-11-20 PROCEDURE — 93005 ELECTROCARDIOGRAM TRACING: CPT

## 2020-11-28 ENCOUNTER — HOSPITAL ENCOUNTER (INPATIENT)
Facility: HOSPITAL | Age: 75
LOS: 9 days | Discharge: HOME | DRG: 871 | End: 2020-12-07
Attending: EMERGENCY MEDICINE | Admitting: HOSPITALIST
Payer: MEDICARE

## 2020-11-28 ENCOUNTER — APPOINTMENT (OUTPATIENT)
Dept: GENERAL RADIOLOGY | Facility: HOSPITAL | Age: 75
DRG: 871 | End: 2020-11-28
Attending: EMERGENCY MEDICINE
Payer: MEDICARE

## 2020-11-28 DIAGNOSIS — J18.9 COMMUNITY ACQUIRED PNEUMONIA, UNSPECIFIED LATERALITY: ICD-10-CM

## 2020-11-28 DIAGNOSIS — R09.02 HYPOXEMIA: Primary | ICD-10-CM

## 2020-11-28 DIAGNOSIS — J18.9 COMMUNITY ACQUIRED PNEUMONIA OF LEFT LUNG, UNSPECIFIED PART OF LUNG: ICD-10-CM

## 2020-11-28 DIAGNOSIS — J96.01 ACUTE RESPIRATORY FAILURE WITH HYPOXIA (HCC): ICD-10-CM

## 2020-11-28 PROCEDURE — 99223 1ST HOSP IP/OBS HIGH 75: CPT | Performed by: HOSPITALIST

## 2020-11-28 PROCEDURE — 71045 X-RAY EXAM CHEST 1 VIEW: CPT | Performed by: EMERGENCY MEDICINE

## 2020-11-28 RX ORDER — SODIUM PHOSPHATE, DIBASIC AND SODIUM PHOSPHATE, MONOBASIC 7; 19 G/133ML; G/133ML
1 ENEMA RECTAL ONCE AS NEEDED
Status: DISCONTINUED | OUTPATIENT
Start: 2020-11-28 | End: 2020-12-07

## 2020-11-28 RX ORDER — BISACODYL 10 MG
10 SUPPOSITORY, RECTAL RECTAL
Status: DISCONTINUED | OUTPATIENT
Start: 2020-11-28 | End: 2020-12-07

## 2020-11-28 RX ORDER — DOCUSATE SODIUM 100 MG/1
100 CAPSULE, LIQUID FILLED ORAL 2 TIMES DAILY
Status: DISCONTINUED | OUTPATIENT
Start: 2020-11-28 | End: 2020-12-07

## 2020-11-28 RX ORDER — LEVOTHYROXINE SODIUM 0.05 MG/1
50 TABLET ORAL
Status: DISCONTINUED | OUTPATIENT
Start: 2020-11-29 | End: 2020-12-07

## 2020-11-28 RX ORDER — METOPROLOL TARTRATE 50 MG/1
50 TABLET, FILM COATED ORAL 2 TIMES DAILY
Status: DISCONTINUED | OUTPATIENT
Start: 2020-11-28 | End: 2020-11-29

## 2020-11-28 RX ORDER — ENOXAPARIN SODIUM 100 MG/ML
40 INJECTION SUBCUTANEOUS DAILY
Status: DISCONTINUED | OUTPATIENT
Start: 2020-11-28 | End: 2020-12-04

## 2020-11-28 RX ORDER — POLYETHYLENE GLYCOL 3350 17 G/17G
17 POWDER, FOR SOLUTION ORAL DAILY PRN
Status: DISCONTINUED | OUTPATIENT
Start: 2020-11-28 | End: 2020-12-07

## 2020-11-28 RX ORDER — VANCOMYCIN HYDROCHLORIDE
25 ONCE
Status: COMPLETED | OUTPATIENT
Start: 2020-11-28 | End: 2020-11-29

## 2020-11-28 RX ORDER — METOPROLOL TARTRATE 50 MG/1
50 TABLET, FILM COATED ORAL 2 TIMES DAILY
Status: ON HOLD | COMMUNITY
End: 2020-12-05

## 2020-11-28 NOTE — ED INITIAL ASSESSMENT (HPI)
PT TO ED FROM HOME WITH C/O GABRIELLA, PT SWALLOWED \"PEAR'L PILL THIS MORNING, VOMMITIED, PT CAN TASTES PILL STILL IN HIS THROAT. + SHAKING

## 2020-11-28 NOTE — H&P
[facility]PITAL[facility]  History and Physical     Wood Manning Patient Status:  Emergency    1945 MRN [id number]   Location [address] [facility] Attending Shubham Villaseñor MD   Hosp Day # 0 PCP Basia Velazquez MD     Chief Complaint: apixaban 5 MG Oral Tab, Take 1 tablet (5 mg total) by mouth 2 (two) times daily. , Disp: 60 tablet, Rfl: 3    •  simvastatin 20 MG Oral Tab, Take 20 mg by mouth nightly., Disp: , Rfl:     •  Levothyroxine Sodium 50 MCG Oral Tab, Take 50 mcg by mouth before failure  1. Suspected aspiration  2. o2 as needed  3. On a NRB- wean as able  4. Empiric antibiotics  5. Rapid COVID ngative, check   6. Follow Cx results  2. Pulmonary hypertension  3. Recent Thrush  4.  Atrial fibrillation/ AS and rheumatic MV s/p AV

## 2020-11-28 NOTE — CONSULTS
[facility] Note  [facility]  Report of Consultation    Donnal Pain Patient Status:  Emergency    1945 MRN [id number]   Location [address] [place] [address] Attending CELINE Bryant face: Negative for hearing loss, tinnitus, nasal congestion, snoring, sore throat, hoarseness and voice change.   Respiratory: cough and hemoptysis  Cardiovascular: Negative for chest pain, palpitations, irregular heart beats, syncope, fatigue, orthopnea, p groin  MSK: Normal strength and sensation in all extremities  EXT: No overt deformities pos clubbing  SKIN: No rashes, ulcers, nodules    Lab Data Review:  Recent Labs   Lab 11/28/20  1322   *   BUN 15   CREATSERUM 1.24   GFRAA 64   GFRNAA 56*   CA intersitial and alveolar infiltrates. Given clubbing, ? Long standing though 2019 ct (when pna occurred) showed no evidence of chronic scarring of lungs.    · If covid positive then will start dex and remdesivir  · May need speech swallow eval. Wife reports

## 2020-11-28 NOTE — ED NOTES
RN to RN rpt given, all questions answered, w/o difficulty; RN to transport Pt w/ cardiac monitor in tact

## 2020-11-28 NOTE — ED PROVIDER NOTES
Patient Seen in: [facility] Emergency Department      History   Patient presents with:  Difficulty Breathing  Fatigue    Stated Complaint: GABRIELLA/fatigue    HPI    22-year-old male presents with shortness of breath, cough, general fatigue.   No reports src 11/28/20 1250 Temporal   SpO2 11/28/20 1250 (!) 80 %   O2 Device 11/28/20 1340 Non-rebreather mask       Current:/68   Pulse 92   Temp 98.6 °F (37 °C) (Temporal)   Resp 22   Ht 177.8 cm (5' 10\")   Wt 59.4 kg   SpO2 95%   BMI 18.80 kg/m² W/ DIFFERENTIAL[[id number]]          Abnormal            Final result                 Please view results for these tests on the individual orders.    URINALYSIS WITH CULTURE REFLEX   LACTIC ACID 3 HR POST POSITIVE   RAINBOW DRAW BLUE   RAINBOW DRAW LAVENDER Patient receiving septic fluid bolus.   Admission disposition: 11/28/2020  2:21 PM                             Disposition and Plan     Clinical Impression:  Hypoxemia  (primary encounter diagnosis)  Community acquired pneumonia, unspecified laterality    D

## 2020-11-29 ENCOUNTER — APPOINTMENT (OUTPATIENT)
Dept: CV DIAGNOSTICS | Facility: HOSPITAL | Age: 75
DRG: 871 | End: 2020-11-29
Attending: HOSPITALIST
Payer: MEDICARE

## 2020-11-29 PROCEDURE — 93306 TTE W/DOPPLER COMPLETE: CPT | Performed by: HOSPITALIST

## 2020-11-29 PROCEDURE — 99232 SBSQ HOSP IP/OBS MODERATE 35: CPT | Performed by: HOSPITALIST

## 2020-11-29 RX ORDER — POTASSIUM CHLORIDE 14.9 MG/ML
20 INJECTION INTRAVENOUS ONCE
Status: COMPLETED | OUTPATIENT
Start: 2020-11-29 | End: 2020-11-29

## 2020-11-29 NOTE — PLAN OF CARE
Received pt AOX4, RAYMUNDO independently, difficult to understand verbally due to PHX of throat squamous cell carcinoma. Pt hypotensive 1 L bolus saline give with no positive results. Pt required to be placed on levophed to increase blood pressure.   Pt was al

## 2020-11-29 NOTE — PLAN OF CARE
Pt seen for BSSE. Pt with inconsistent wet vocal quality. Recommend chopped and thins with a VFSS for 11/30/20. RN and pt aware.

## 2020-11-29 NOTE — PLAN OF CARE
Assumed care of patient this morning at shift change. Pt alert/oriented x4, on 2L NC. Levo weaned off. BP goal changed to MAP>55 due to patient's baseline low blood pressure.   Passed swallow eval by [address] [place] [address] Street for chopped/soft diet and thin liquids while

## 2020-11-29 NOTE — PROGRESS NOTES
[facility] Pulmonary/Critical Care Progress Note     SUBJECTIVE/Interval history: All events, procedures, notes reviewed. Pt feels well today. He is now on 2lpm O2 but requiring levophed 3 mcg. Denies cp or sob.  He repo 9.4 8.0*    140   K 3.6 3.8    112   CO2 29.0 24.0     Recent Labs   Lab 11/28/20  1322 11/29/20  0432   RBC 4.65 3.69*   HGB 14.7 11.7*   HCT 44.0 35.3*   MCV 94.6 95.7   MCH 31.6 31.7   MCHC 33.4 33.1   RDW 12.8 12.9   NEPRELIM 7.49 10.42* Systolic pressure was moderately increased, estimated      to be 52mm Hg    Interval Culture Data:   No results found for this visit on 11/28/20.   No results for input(s): Jobie Rubinstein, [address] [facility], [address] [facility], [address], [address], [address] [facility], [facility], [place] [facility]  · Hold metoprolol while on pressors.  Troponin neg x 2  · Proph: enoxaparin  · D/w rn/apn  · Cc time 40 min      Daina Messina MD

## 2020-11-29 NOTE — PLAN OF CARE
Patient received from ED alert and oriented on 15L NRB. Transitioned patient to 6L HFNC. Patient tolerating well. Patient noted to have low blood pressures. IVF ordered at this time. Admission assessment completed and all questions answered.  Patient denies

## 2020-11-29 NOTE — PROGRESS NOTES
[facility] Dosing Service    Initial Pharmacokinetic Consult for Vancomycin Dosing     Caryle Bass is a 66year old patient who is being treated for pneumonia.   Pharmacy has been asked to dose Vancomycin by Sascha CERNA    Allergies:  Compazine

## 2020-11-29 NOTE — PROGRESS NOTES
[facility]  Progress Note     Rocío Tomlin Patient Status:  Inpatient    1945 MRN [id number]   [facility] 4SW-A Attending Nani Robbins MD   Hosp Day # 1 PCP Lennox Cope MD     Chief Complaint: SOB    S: Patient feeli Not Detected 11/28/2020    COVID19 Not Detected 11/28/2020       Pro-Calcitonin  Recent Labs   Lab 11/28/20 2038   PCT 5.49*       Cardiac  Recent Labs   Lab 11/28/20  1750 11/28/20 2038 11/28/20  2326   TROP <0.045  --  <0.045   PBNP  --  2,129*  --

## 2020-11-29 NOTE — DIETARY NOTE
[facility]    NUTRITION ASSESSMENT    Pt meets moderate malnutrition criteria.     CRITERIA FOR MALNUTRITION DIAGNOSIS:  Criteria for non-severe malnutrition diagnosis: chronic illness related to energy intake less than75% for greater than 1 month, bod lb)  01/10/19 : 65.8 kg (145 lb)  02/01/18 : 68 kg (150 lb)  08/22/17 : 65.8 kg (145 lb)      NUTRITION:  Diet: Chopped, Thin liquids  Oral Supplements: CIB BID    FOOD/NUTRITION RELATED HISTORY:  Appetite: Poor  Intake: <75%  Intake Meeting Needs: No, but

## 2020-11-30 ENCOUNTER — DOCUMENTATION (OUTPATIENT)
Dept: CARDIOLOGY | Age: 75
End: 2020-11-30

## 2020-11-30 ENCOUNTER — TELEPHONE (OUTPATIENT)
Dept: CARDIOLOGY | Age: 75
End: 2020-11-30

## 2020-11-30 ENCOUNTER — APPOINTMENT (OUTPATIENT)
Dept: GENERAL RADIOLOGY | Facility: HOSPITAL | Age: 75
DRG: 871 | End: 2020-11-30
Attending: HOSPITALIST
Payer: MEDICARE

## 2020-11-30 DIAGNOSIS — I42.0 DILATED CARDIOMYOPATHY (CMD): ICD-10-CM

## 2020-11-30 PROCEDURE — 74230 X-RAY XM SWLNG FUNCJ C+: CPT | Performed by: HOSPITALIST

## 2020-11-30 PROCEDURE — 99232 SBSQ HOSP IP/OBS MODERATE 35: CPT | Performed by: HOSPITALIST

## 2020-11-30 RX ORDER — SODIUM CHLORIDE, SODIUM LACTATE, POTASSIUM CHLORIDE, CALCIUM CHLORIDE 600; 310; 30; 20 MG/100ML; MG/100ML; MG/100ML; MG/100ML
INJECTION, SOLUTION INTRAVENOUS CONTINUOUS
Status: DISCONTINUED | OUTPATIENT
Start: 2020-11-30 | End: 2020-12-07

## 2020-11-30 RX ORDER — METOPROLOL TARTRATE 50 MG/1
TABLET, FILM COATED ORAL
Qty: 220 TABLET | Refills: 1 | Status: SHIPPED | OUTPATIENT
Start: 2020-11-30 | End: 2023-05-02 | Stop reason: DRUGHIGH

## 2020-11-30 RX ORDER — SIMVASTATIN 20 MG
20 TABLET ORAL AT BEDTIME
Qty: 90 TABLET | Refills: 1 | Status: SHIPPED | OUTPATIENT
Start: 2020-11-30 | End: 2023-11-03 | Stop reason: ALTCHOICE

## 2020-11-30 RX ORDER — FLUCONAZOLE 2 MG/ML
200 INJECTION, SOLUTION INTRAVENOUS EVERY 24 HOURS
Status: DISCONTINUED | OUTPATIENT
Start: 2020-11-30 | End: 2020-12-06

## 2020-11-30 NOTE — PLAN OF CARE
Pt alert and oriented x4 on 2L of oxygen and pt is tolerating it well. Pt vitals has been stable and pt has been afebrile during this shift. Pt has denied any pain or sob at this time. Pt is getting iv antibiotic per orders.  No questions at this time will WBC  - Administer growth factors as ordered  - Implement neutropenic guidelines  Outcome: Progressing

## 2020-11-30 NOTE — PHYSICAL THERAPY NOTE
PHYSICAL THERAPY QUICK EVALUATION - INPATIENT    Room Number: 408/408-A  Evaluation Date: 11/30/2020  Presenting Problem: SOB, cough, fatigue  Physician Order: PT Eval and Treat  History:  Admitted with SOB, fatigue and cough rapid COVID negative CXR Adilene Zamora light touch grossly intact BLEs       ACTIVITY TOLERANCE                         O2 WALK  SPO2 on Room Air at Rest: 96  SPO2 Ambulation on Room Air: 86     Ambulation oxygen flow (liters per minute): 2      AM-PAC '6-Clicks' INPATIENT SHORT FORM - BASIC MO Functional outcome measures completed include AMPAC. Based on this evaluation, patient's clinical presentation is stable and overall evaluation complexity is considered low.   PT Discharge Recommendations: Home    PLAN  Patient has been evaluated and prese

## 2020-11-30 NOTE — CONSULTS
Pulmonary H&P/Consult       NAME: Stefania Steen - ROOM: [address] [place] [address] - MRN: [id number] - Age: 66year old - :  1945    Date of Admission: 2020 12:59 PM  Admission Diagnosis: Hypoxemia [R09.02]  Community acquired pneumonia, unspecified lateral Spouse name: Not on file      Number of children: Not on file      Years of education: Not on file      Highest education level: Not on file    Occupational History      Not on file    Social Needs      Financial resource strain: Not on file      Food inse Oral Tab, Take 50 mcg by mouth before breakfast., Disp: , Rfl:         Scheduled Medication:  • enoxaparin  40 mg Subcutaneous Daily   • docusate sodium  100 mg Oral BID   • Levothyroxine Sodium  50 mcg Oral Before breakfast   • piperacillin-tazobactam  3. Left arm)   Pulse 112   Temp 97.8 °F (36.6 °C) (Oral)   Resp 20   Ht 5' 10\" (1.778 m)   Wt 124 lb 5.4 oz (56.4 kg)   SpO2 96%   BMI 17.84 kg/m²     General Appearance:    Alert, cooperative, no distress, appears stated age   Head:    Normocephalic, withou

## 2020-11-30 NOTE — PLAN OF CARE
Problem: Impaired Activities of Daily Living  Goal: Achieve highest/safest level of independence in self care  Description: Interventions:  - Assess ability and encourage patient to participate in ADLs to maximize function  - Promote sitting position [facility]

## 2020-11-30 NOTE — PLAN OF CARE
Patient arrived to unit from ICU, transferred to bed. Patient welcomed and oriented to unit. Patient alert and oriented. Fall and safety precautions in place. Patient declines any needs at this time.

## 2020-11-30 NOTE — PLAN OF CARE
Pt is aaox4, denies pain at this time, noted that pt cough with every bite of his scrambled egg, pt stated that it is dry, pt had the video swallow and failed.  ST reported that pt failed the video with all food and fluid consistency, noted that his tongue pain goal  Description: INTERVENTIONS:  - Encourage pt to monitor pain and request assistance  - Assess pain using appropriate pain scale  - Administer analgesics based on type and severity of pain and evaluate response  - Implement non-pharmacological jovanna interpreters to assist at discharge as needed  - Consider post-discharge preferences of patient/family/discharge partner  - Complete POLST form as appropriate  - Assess patient's ability to be responsible for managing their own health  - Refer to [facility]

## 2020-11-30 NOTE — VIDEO SWALLOW STUDY NOTE
ADULT VIDEOFLUOROSCOPIC SWALLOWING STUDY    Admission Date: 11/28/2020  Evaluation Date: 11/30/20  Radiologist: Dr. Oquendo Monday   Diet Recommendations - Solids: NPO  Diet Recommendations - Liquid: NPO    Further Follow-up:  Follow Up Needed small amount of subarachnoid hemorrhage noted within the left sylvian fissure. 2. Stable hypodensity within right temporal and occipital lobe which may represent subacute to chronic infarct.   If further evaluation is needed or clinical suspicion for acute Formation (VFSS - Nectar Thick Liquids): Impaired  Triggered at: Pyriform sinuses  Premature Spillage to: Pyriform sinuses  Delay (seconds): yes  Residue Severity, Location: Moderate;Pyriform Sinuses; Valleculae; Throughout pharynx  Cleared/Reduced with:  Att the swallow. Events of airway penetration and silent aspiration assessed with thin liquids and nectar thick liquids. Pt utilized >5 swallows to clear each bolus 2/2 residuals.   Lack of epiglottic retroversion allowing bolus to fill valleculae, spill over therapy;repeat Videofluoroscopic swallow study     Thank you for your referral.   If you have any questions, please contact Dale Gama MA, [address] [facility] [place]  Speech and Language Pathologist  [phone]  Pager #[phone]

## 2020-11-30 NOTE — PROGRESS NOTES
[facility]  Progress Note     Aron Mccstacyshari Patient Status:  Inpatient    1945 MRN [id number]   [facility] 4SW-A Attending Burgess Shante MD   Hosp Day # 2 PCP Yusef Montejo MD     Chief Complaint: SOB    S: Patient feeli Component Value Date    COVID19 Not Detected 11/28/2020    COVID19 Not Detected 11/28/2020       Pro-Calcitonin  Recent Labs   Lab 11/28/20 2038   PCT 5.49*       Cardiac  Recent Labs   Lab 11/28/20  1750 11/28/20 2038 11/28/20  2326   TROP <0.045  -- after discharge, patient will require TBD.

## 2020-11-30 NOTE — OCCUPATIONAL THERAPY NOTE
OCCUPATIONAL THERAPY QUICK EVALUATION - INPATIENT    Room Number: 408/408-A  Evaluation Date: 11/30/2020     Type of Evaluation: Quick Eval  Presenting Problem: Hypoxemia    Physician Order: IP Consult to Occupational Therapy  Reason for Therapy:  ADL/IADL SPO2 Ambulation on Oxygen: 92  Ambulation oxygen flow (liters per minute): 2    ACTIVITIES OF DAILY LIVING ASSESSMENT  AM-PAC ‘6-Clicks’ Inpatient Daily Activity Short Form   How much help from another person does the patient currently need…  -   Putting Overall Complexity  LOW     OT Discharge Recommendations: Home  OT Device Recommendations: None    PLAN   Patient has been evaluated and presents with no skilled Occupational Therapy needs at this time.   Patient discharged from Occupational Therapy servi

## 2020-12-01 ENCOUNTER — APPOINTMENT (OUTPATIENT)
Dept: CARDIOLOGY | Age: 75
End: 2020-12-01

## 2020-12-01 PROCEDURE — 99232 SBSQ HOSP IP/OBS MODERATE 35: CPT | Performed by: HOSPITALIST

## 2020-12-01 RX ORDER — MAGNESIUM SULFATE HEPTAHYDRATE 40 MG/ML
2 INJECTION, SOLUTION INTRAVENOUS ONCE
Status: COMPLETED | OUTPATIENT
Start: 2020-12-01 | End: 2020-12-01

## 2020-12-01 RX ORDER — METOPROLOL TARTRATE 5 MG/5ML
5 INJECTION INTRAVENOUS EVERY 6 HOURS
Status: DISCONTINUED | OUTPATIENT
Start: 2020-12-01 | End: 2020-12-07

## 2020-12-01 NOTE — PLAN OF CARE
Pt A/O x4. Vitals stable. Afebrile. No c/o pain or SOB. On 2L O2, able to wean to 1L overnight. Continued on IV Zosyn. IV fluids per MAR. Per day RN, pt refusing to be straight cathed at 1810 last night. Bladder scan at 2228 showing only 200 cc.  Pt voided Verbalizes/displays adequate comfort level or patient's stated pain goal  Description: INTERVENTIONS:  - Encourage pt to monitor pain and request assistance  - Assess pain using appropriate pain scale  - Administer analgesics based on type and severity of learning needs (meds, wound care, etc)  - Arrange for interpreters to assist at discharge as needed  - Consider post-discharge preferences of patient/family/discharge partner  - Complete POLST form as appropriate  - Assess patient's ability to be responsib Educate and engage patient/family in tolerated activity level and precautions  - up to chair daily  - ambulate on unit with assist  Outcome: Progressing     Problem: Impaired Activities of Daily Living  Goal: Achieve highest/safest level of independence in

## 2020-12-01 NOTE — PROGRESS NOTES
[facility]  Progress Note     Angela Jon Patient Status:  Inpatient    1945 MRN [id number]   [facility] 4SW-A Attending Jimi Jha MD   Hosp Day # 3 PCP Bella Martínez MD     Chief Complaint: SOB    S: Patient faile hours.         COVID-19 Lab Results    COVID-19  Lab Results   Component Value Date    COVID19 Not Detected 11/28/2020    COVID19 Not Detected 11/28/2020       Pro-Calcitonin  Recent Labs   Lab 11/28/20 2038   PCT 5.49*       Cardiac  Recent Labs   Lab 11

## 2020-12-01 NOTE — CM/SW NOTE
12/01/20 1000   CM/SW Referral Data   Referral Source Social Work (self-referral)   Reason for Referral Discharge [facility] Staff   Patient Info   Patient's Mental Status Alert;Oriented   Patient's [address]   Patient live

## 2020-12-01 NOTE — PLAN OF CARE
Pt Aox4. No c/o pain. BP stable. HR at rest this AM 110s. Afib. With any activity, up to 150s. Asymptomatic. Strict NPO for aspiration precautions. Unable to take PO metoprolol. New order for lopressor IV scheduled with parameters.  Potassium and magnesium

## 2020-12-01 NOTE — SLP NOTE
SPEECH DAILY NOTE - INPATIENT    ASSESSMENT & PLAN   ASSESSMENT  Patient seen for dysphagia intervention as per POC recommendations from VFSS completed yesterday. Pt received awake, alert, and pleasant.  Pt confirmed speaking with pulmonary MD prior to [facility] frequency given by slp in the next 1-3 days. Initiated;  In progress   Goal #2 The patient will complete exercises targeting hyolaryngeal elevation and base of tongue strength for 2-3 weeks and monitor for improvements in symptoms of dysphagia and aspiratio

## 2020-12-01 NOTE — PLAN OF CARE
Problem: Impaired Swallowing  Goal: Minimize aspiration risk  Description: Recommend the following:  -NPO  -Frequent, gentle oral care/hygiene  Outcome: Progressing

## 2020-12-01 NOTE — PROGRESS NOTES
Pulmonary Progress Note        NAME: Millie Plummer - ROOM: 408/408-A - MRN: [id number] - Age: 66year old - : 1945        Last 24hrs: No events overnight    OBJECTIVE:   20  0042 20  0417 20  0717   BP: 105/69 116 HTN  -follows w/ [facility]  4.  FEN  -strict NPO per speech, will need enteral access  -discussed w/ Patient and wife, they are leaning towards a PEG tube with plans for further speech therapy as an opt w/ the hopes of being able to have some oral feeds  -advised

## 2020-12-02 ENCOUNTER — APPOINTMENT (OUTPATIENT)
Dept: GENERAL RADIOLOGY | Facility: HOSPITAL | Age: 75
DRG: 871 | End: 2020-12-02
Attending: INTERNAL MEDICINE
Payer: MEDICARE

## 2020-12-02 ENCOUNTER — APPOINTMENT (OUTPATIENT)
Dept: GENERAL RADIOLOGY | Facility: HOSPITAL | Age: 75
DRG: 871 | End: 2020-12-02
Attending: NURSE PRACTITIONER
Payer: MEDICARE

## 2020-12-02 PROBLEM — E46 MALNUTRITION (HCC): Status: ACTIVE | Noted: 2020-12-02

## 2020-12-02 PROCEDURE — 99232 SBSQ HOSP IP/OBS MODERATE 35: CPT | Performed by: HOSPITALIST

## 2020-12-02 PROCEDURE — 71045 X-RAY EXAM CHEST 1 VIEW: CPT | Performed by: INTERNAL MEDICINE

## 2020-12-02 PROCEDURE — 71045 X-RAY EXAM CHEST 1 VIEW: CPT | Performed by: NURSE PRACTITIONER

## 2020-12-02 RX ORDER — MAGNESIUM SULFATE HEPTAHYDRATE 40 MG/ML
2 INJECTION, SOLUTION INTRAVENOUS ONCE
Status: COMPLETED | OUTPATIENT
Start: 2020-12-02 | End: 2020-12-02

## 2020-12-02 RX ORDER — POTASSIUM CHLORIDE 14.9 MG/ML
20 INJECTION INTRAVENOUS ONCE
Status: COMPLETED | OUTPATIENT
Start: 2020-12-02 | End: 2020-12-02

## 2020-12-02 NOTE — CONSULTS
[facility]                       Gastroenterology [address] [facility] Gastroenterology    Angela Jon Patient Status:  Inpatient    1945 MRN [id number]   [facility] 4NW-A Attending Jimi Jha MD   [address] Day # 4 [facility] Tartrate (LOPRESSOR) injection 5 mg, 5 mg, Intravenous, Q6H    •  [COMPLETED] sodium chloride 0.9% IV bolus 250 mL, 250 mL, Intravenous, Once    •  Fluconazole in Sodium Chloride (DIFLUCAN) IVPB premix 200 mg, 200 mg, Intravenous, Q24H    •  nystatin (MYCO gastrointestinal malignancies; No family history of ulcer disease, or inflammatory bowel disease    ROS:  In addition to the pertinent positives described above:             Infectious Disease:  + admitted with aspiration PNA            Cardiovascular: + A peripheral edema or cyanosis    Skin: Warm and dry    Psychiatric: Appropriate mood and congruent affect without obvious depression or anxiety    Labs:   Lab Results   Component Value Date    K 3.8 12/02/2020    MG 1.7 12/02/2020     Recent Labs   Lab 11/2 The upper GI  endoscopy was accomplished without difficulty. The  patient tolerated the procedure well.   Findings:  A benign-appearing, intrinsic mild stenosis measuring 2 cm (in length) x  1.5 cm (inner diameter) was found 16 to 18 cm from the incisors an anti-inflammatory drugs for 2 weeks. - The findings and recommendations were discussed with  the patient. - The findings and recommendations were discussed with  the patient's family.   - The findings and recommendations were discussed with  the referring discussed with the patient and the primary team about trial of endoscopic placement versus IR placement of G tube; given high likelihood of nonamenable anatomy for PEG placement, will consult IR for G-tube  -Continue nystatin    GI will sign off at this ti

## 2020-12-02 NOTE — PLAN OF CARE
Pt received alert and oriented x4 on RA with O2 sats ranging from 88-92%. Pt O2 sats were decreasing at rest, around 86/87% put on 2L NC. O2 sats now above 95%. Actively titrating O2 off. Pt did not complain of pain.  HR is tachy, highest HR in the 150s whe

## 2020-12-02 NOTE — PROGRESS NOTES
[facility]  Progress Note     Iva Espinal Patient Status:  Inpatient    1945 MRN [id number]   [facility] 4SW-A Attending Sharyn Altamirano MD   Hosp Day # 4 PCP Fiordaliza Mccurdy MD     Chief Complaint: SOB    S: no new overni mg/dL). No results for input(s): PTP, INR in the last 168 hours.          COVID-19 Lab Results    COVID-19  Lab Results   Component Value Date    COVID19 Not Detected 11/28/2020    COVID19 Not Detected 11/28/2020       Pro-Calcitonin  Recent Labs   Lab 1

## 2020-12-02 NOTE — SLP NOTE
Pt going for PEG tube placement today. Will continue to follow for dysphagia tx as available and appropriate.     Sarahy Urias MA, [address] [facility]  Pager

## 2020-12-02 NOTE — PROGRESS NOTES
[facility] Patient Status:  Inpatient    1945 MRN [id number]   [facility] 4NW-A Attending Feliz Alvarez MD   Hosp Day # 4 PCP Bridger Chaudhari MD     SUBJECTIVE: feels well awaiting PEG tomorrow     OBJECTIVE: sounds normal; no masses,  no organomegaly  Extremities: extremities normal, atraumatic, no cyanosis or edema        Lab Results   Component Value Date    K 3.8 12/02/2020     Lab Results   Component Value Date    PT 22.0 (H) 01/08/2013    PT 19.0 (H) 01/0

## 2020-12-02 NOTE — PLAN OF CARE
A&O times 4. VSS, afebrile. On tele NSR, ST. Denies any pain. Denies any SOB. On 1L O2, desats while sleeping. Sating 96% on 1L, lung sounds diminished. NPO. Peg placement in IR tomorrow. Dobhoff in place for prep per IR, tolerated well.   Xray to c Utilize distraction and/or relaxation techniques  - Monitor for opioid side effects  - Notify MD/LIP if interventions unsuccessful or patient reports new pain  - Anticipate increased pain with activity and pre-medicate as appropriate  Outcome: Progressing

## 2020-12-03 ENCOUNTER — APPOINTMENT (OUTPATIENT)
Dept: INTERVENTIONAL RADIOLOGY/VASCULAR | Facility: HOSPITAL | Age: 75
DRG: 871 | End: 2020-12-03
Attending: NURSE PRACTITIONER
Payer: MEDICARE

## 2020-12-03 PROCEDURE — BD12ZZZ FLUOROSCOPY OF STOMACH: ICD-10-PCS | Performed by: RADIOLOGY

## 2020-12-03 PROCEDURE — 99232 SBSQ HOSP IP/OBS MODERATE 35: CPT | Performed by: INTERNAL MEDICINE

## 2020-12-03 PROCEDURE — 0DH63UZ INSERTION OF FEEDING DEVICE INTO STOMACH, PERCUTANEOUS APPROACH: ICD-10-PCS | Performed by: RADIOLOGY

## 2020-12-03 RX ORDER — MORPHINE SULFATE 4 MG/ML
4 INJECTION, SOLUTION INTRAMUSCULAR; INTRAVENOUS EVERY 2 HOUR PRN
Status: DISCONTINUED | OUTPATIENT
Start: 2020-12-03 | End: 2020-12-07

## 2020-12-03 RX ORDER — LIDOCAINE HYDROCHLORIDE 10 MG/ML
INJECTION, SOLUTION INFILTRATION; PERINEURAL
Status: COMPLETED
Start: 2020-12-03 | End: 2020-12-03

## 2020-12-03 RX ORDER — CEFAZOLIN SODIUM 1 G/3ML
INJECTION, POWDER, FOR SOLUTION INTRAMUSCULAR; INTRAVENOUS
Status: COMPLETED
Start: 2020-12-03 | End: 2020-12-03

## 2020-12-03 RX ORDER — MORPHINE SULFATE 2 MG/ML
2 INJECTION, SOLUTION INTRAMUSCULAR; INTRAVENOUS EVERY 2 HOUR PRN
Status: DISCONTINUED | OUTPATIENT
Start: 2020-12-03 | End: 2020-12-07

## 2020-12-03 RX ORDER — MIDAZOLAM HYDROCHLORIDE 1 MG/ML
INJECTION INTRAMUSCULAR; INTRAVENOUS
Status: COMPLETED
Start: 2020-12-03 | End: 2020-12-03

## 2020-12-03 RX ORDER — MAGNESIUM SULFATE HEPTAHYDRATE 40 MG/ML
2 INJECTION, SOLUTION INTRAVENOUS ONCE
Status: COMPLETED | OUTPATIENT
Start: 2020-12-03 | End: 2020-12-03

## 2020-12-03 NOTE — PROGRESS NOTES
[facility]  Progress Note     Millie Plummer Patient Status:  Inpatient    1945 MRN [id number]   [facility] 4NW-A Attending Bella Mittal MD   Hosp Day # 5 PCP Danielle Murphy MD     Chief Complaint: dysphagia     S: Patient AST 12* 17 13*  --   --    ALT 15* 12* 9*  --   --    BILT 2.4* 2.5* 2.3*  --   --    TP 6.2* 6.5 6.7  --   --        Estimated Creatinine Clearance: 55.4 mL/min (based on SCr of 0.89 mg/dL).     Recent Labs   Lab 12/03/20  0716   PTP 16.4*   INR 1.28*

## 2020-12-03 NOTE — PLAN OF CARE
A&Ox4. VSS, afebrile this shift. Strict NPO, failed video swallow. NGT for barium administration earlier this AM on previous shift, clamped otherwise. IR placed a g-tube this afternoon.  Patient tolerated procedure well, returned to the floor in stable co Progressing     Problem: PAIN - ADULT  Goal: Verbalizes/displays adequate comfort level or patient's stated pain goal  Description: INTERVENTIONS:  - Encourage pt to monitor pain and request assistance  - Assess pain using appropriate pain scale  - Adminis transportation as appropriate  - Identify discharge learning needs (meds, wound care, etc)  - Arrange for interpreters to assist at discharge as needed  - Consider post-discharge preferences of patient/family/discharge partner  - Complete POLST form as twin Assess ability and encourage patient to participate in ADLs to maximize function  - Promote sitting position while performing ADLs such as feeding, grooming, and bathing  - Educate and encourage patient/family in tolerated functional activity level and pre

## 2020-12-03 NOTE — PROGRESS NOTES
Pulmonary Progress Note        NAME: Juhi Robbins - ROOM: [address] [place] [address] - MRN: [id number] - Age: 66year old - : 1945        Last 24hrs: No events overnight, awaiting PEG tube, needing more O2 with activity    OBJECTIVE:   20  0039 20  0 HTN  -follows w/ [facility]  4. Dysphagia  -strict NPO per speech,   -PEG today  5. Proph- LMWH  6.  Dispo- dc planning once PEG is in place and O2 is weaned off      [address] [facility] Pulmonary and Critical Care

## 2020-12-03 NOTE — PLAN OF CARE
Pt received A&Ox4 on 1L RA. O2 sats above 90%. Dobhoff placed on days, placement confirmed with Xray and Auscultation, 50 at nare. IR PEG tube placement for today. Barium prep given at 0200 through Dobhoff without complication. Well tolerated by patient.  V

## 2020-12-03 NOTE — CM/SW NOTE
Pt getting a PEG tube placed today. Will follow up regarding PEG tube formula/supplies.  SW to remain available and follow up if any other needs arise

## 2020-12-04 PROCEDURE — 99232 SBSQ HOSP IP/OBS MODERATE 35: CPT | Performed by: INTERNAL MEDICINE

## 2020-12-04 RX ORDER — POTASSIUM CHLORIDE 14.9 MG/ML
20 INJECTION INTRAVENOUS ONCE
Status: COMPLETED | OUTPATIENT
Start: 2020-12-04 | End: 2020-12-04

## 2020-12-04 RX ORDER — MAGNESIUM SULFATE HEPTAHYDRATE 40 MG/ML
2 INJECTION, SOLUTION INTRAVENOUS ONCE
Status: COMPLETED | OUTPATIENT
Start: 2020-12-04 | End: 2020-12-04

## 2020-12-04 RX ORDER — ATORVASTATIN CALCIUM 10 MG/1
10 TABLET, FILM COATED ORAL NIGHTLY
Status: DISCONTINUED | OUTPATIENT
Start: 2020-12-04 | End: 2020-12-07

## 2020-12-04 NOTE — HOME CARE LIAISON
Received referral from Pietro Khan. Met with patient at the bedside to discuss home health services and offer choice. Patient/family is not agreeable to [facility] services at discharge.      Brochure and contact information provided in case pt would like to ini

## 2020-12-04 NOTE — SLP NOTE
SPEECH DAILY NOTE - INPATIENT    ASSESSMENT & PLAN   ASSESSMENT  Patient seen for dysphagia tx at bedside; spouse present. RN present to administer enteral feedings.  Initiated dysphagia education with Pt/spouse regarding post acute dysphagia tx at [facility]! [employer] surgical mask. Patient was not masked. Spouse was masked. RN was masked.        If you have any questions, please contact Jose Fairchild 87 CCC-SLP/L, pager [phone]  Speech-LanguagePathologist

## 2020-12-04 NOTE — CM/SW NOTE
SW received a call from [employer] at [place] stating they need the final dietary order. Paged the dietician in regards to this. Pt and wife both refused HH.  Wife stating, she has seen other people who have been on tube feedings before and she thinks she could

## 2020-12-04 NOTE — CM/SW NOTE
Pt going to need tube feedings at [place]. Referral sent to [facility] checking to see if they have staff. Asked [facility] if they can provide an RN.  SW to remain available and follow up if any other needs arise

## 2020-12-04 NOTE — PROGRESS NOTES
Pulmonary Progress Note        NAME: John Sánchez - ROOM: 534/563-X - MRN: [id number] - Age: 66year old - : 1945        Last 24hrs: No events overnight, had his PEG yesterday, not using IS much    OBJECTIVE:   20  0500 20  0800 12 [facility]  4. Dysphagia  -strict NPO per speech,   -PEG for TFs  5. Proph- LMWH  6.  Dispo-   -ok to d/c tomorrow after AM dose of zosyn  -should f/u w/ me in clinic in 1-2 weeks, will need chest x-ray in 4-6 weeks      Erendira Galindo [facility] Pulmonary and Critical

## 2020-12-04 NOTE — PLAN OF CARE
Alert and oriented x 4. VSS. Afebrile. No c/o pain or SOB. Nystatin given for thrush. NPO. G tube site clean, dry and intact. Flushed per orders. On 1L NC while asleep sating in the mid 90's. IV fluids infusing. Zosyn q 8. Resting comfortably.  Will continu

## 2020-12-04 NOTE — PROGRESS NOTES
[facility]  Progress Note     Wayne Johnson Patient Status:  Inpatient    1945 MRN [id number]   [facility] 4NW-A Attending Katja Mcintosh MD   Hosp Day # 6 PCP Marva Davila MD     Chief Complaint: dysphagia     S: Patient 24.0 26.0 27.0  --   --   --    ALKPHO 68 66 66  --   --   --    AST 12* 17 13*  --   --   --    ALT 15* 12* 9*  --   --   --    BILT 2.4* 2.5* 2.3*  --   --   --    TP 6.2* 6.5 6.7  --   --   --        Estimated Creatinine Clearance: 55.4 mL/min (based on on discharge?: no  Estimated date of discharge: tomorrow   Discharge is dependent on: progress  At this point Mr. Misha Vargas is expected to be discharge to: home with Izabella Milian     Plan of care discussed with patient and RN.     Carlos Costa MD

## 2020-12-04 NOTE — DIETARY NOTE
[facility]    NUTRITION ASSESSMENT    Pt meets moderate malnutrition criteria.     CRITERIA FOR MALNUTRITION DIAGNOSIS:  Criteria for non-severe malnutrition diagnosis: chronic illness related to energy intake less than75% for greater than 1 month, bod 12/2. Pt last BM 12/2. Pt had urgency of bowels on 12/2 and colace was cancelled. Gave TF reccs above. 11/29 - 75/M admitted with Hypoxemia. PMH includes SCC of throat, with surgery. Pt known to service from previous visits.  Pt unable to be seen due signs/symptoms of intolerance (abdominal discomfort/distention)  4.  Alternative means of nutrition at goal to meet 100% patient nutrition prescription    MEDICATIONS:  Colace, Synthroid, Mag Sulf 2 g, Zosyn, KCl 20 mEq     LABS:  Glu 81, AST and ALT decrea

## 2020-12-05 PROCEDURE — 99232 SBSQ HOSP IP/OBS MODERATE 35: CPT | Performed by: HOSPITALIST

## 2020-12-05 RX ORDER — LEVOTHYROXINE SODIUM 0.05 MG/1
50 TABLET ORAL
Refills: 0 | Status: SHIPPED | COMMUNITY
Start: 2020-12-05

## 2020-12-05 RX ORDER — SIMVASTATIN 20 MG
20 TABLET ORAL NIGHTLY
Refills: 0 | Status: SHIPPED | COMMUNITY
Start: 2020-12-05

## 2020-12-05 RX ORDER — METOPROLOL TARTRATE 50 MG/1
50 TABLET, FILM COATED ORAL 2 TIMES DAILY
Refills: 0 | Status: SHIPPED | COMMUNITY
Start: 2020-12-05 | End: 2020-12-07

## 2020-12-05 NOTE — PLAN OF CARE
Alert and oriented x 4. VSS. Afebrile. No c/o pain or SOB. 1L O2 at night. Tube feeding increased at 2300. Tolerating well. Meds via g tube. Eliquis resumed this evening. Up with stand by. IV zosyn q 8. Resting comfortably. Will continue to monitor.

## 2020-12-05 NOTE — CM/SW NOTE
Tube feeding order form placed on the pt's chart. The MD will need to sign it. RN aware of this. (3) walks occasionally

## 2020-12-05 NOTE — PROGRESS NOTES
Patient tolerating tube feeding well. Now at 50ml/hr. Increase to goal rate due at 2330 tonight. No residuals noted. Denies any nausea/vomiting. Pt now starting to have soft stools. 1 BM reported this morning, not seen by staff. Denies any pain.   Up

## 2020-12-05 NOTE — PLAN OF CARE
A&O times 4. Denies any pain. Continued on IV abx, for asp. PNA. Tube feeding started this afternoon. Started @ 20ml/hr, to be increased by 10ml q8h  until goal rate of 65ml/hr. Tolerating well. HOB at 30 degrees. K+ and mag replaced per protocol.  U

## 2020-12-05 NOTE — PROGRESS NOTES
[facility]  Progress Note     Stefania Steen Patient Status:  Inpatient    1945 MRN [id number]   [facility] 4NW-A Attending Anais Barry MD   Hosp Day # 7 PCP Rodger Sierra MD     Chief Complaint: dysphagia     S: Patient --   --   --   --    CO2 24.0 26.0 27.0  --   --   --   --    ALKPHO 68 66 66  --   --   --   --    AST 12* 17 13*  --   --   --   --    ALT 15* 12* 9*  --   --   --   --    BILT 2.4* 2.5* 2.3*  --   --   --   --    TP 6.2* 6.5 6.7  --   --   --   --     < no  · Central line: no     Will the patient be referred to TCC on discharge?: no  Estimated date of discharge: tomorrow   Discharge is dependent on: progress  At this point Mr. Elise Griggs is expected to be discharge to: home with [facility] AT [facility]     Plan of care discusse

## 2020-12-06 PROCEDURE — 99232 SBSQ HOSP IP/OBS MODERATE 35: CPT | Performed by: HOSPITALIST

## 2020-12-06 NOTE — PLAN OF CARE
Pt is AOX4, VSS. Noted that pt has oral thrush, nystatin administered per orders. Pt is on 1L of oxygen, 02 sats WDL. Pt is on telemetry, NSR/ST. Pt did not have bowel movement this shift. PEG tube flushed. No residuals noted.  Rate of feeding increased to

## 2020-12-06 NOTE — PROGRESS NOTES
[facility]  Progress Note     Charlette Beck Patient Status:  Inpatient    1945 MRN [id number]   [facility] 4NW-A Attending Tamiko Khalil MD   Hosp Day # 8 PCP Nikia Diane MD     Chief Complaint: dysphagia     S: Patient --    AST 17 13*  --   --   --   --    ALT 12* 9*  --   --   --   --    BILT 2.5* 2.3*  --   --   --   --    TP 6.5 6.7  --   --   --   --     < > = values in this interval not displayed.        Estimated Creatinine Clearance: 72.1 mL/min (based on SCr of 0 care discussed with patient and RN.     Gustavo Anaya DO

## 2020-12-07 VITALS
RESPIRATION RATE: 20 BRPM | DIASTOLIC BLOOD PRESSURE: 35 MMHG | OXYGEN SATURATION: 96 % | HEART RATE: 89 BPM | SYSTOLIC BLOOD PRESSURE: 97 MMHG | HEIGHT: 70 IN | TEMPERATURE: 98 F | WEIGHT: 136.25 LBS | BODY MASS INDEX: 19.51 KG/M2

## 2020-12-07 PROCEDURE — 99232 SBSQ HOSP IP/OBS MODERATE 35: CPT | Performed by: HOSPITALIST

## 2020-12-07 NOTE — DISCHARGE SUMMARY
[facility]  DISCHARGE SUMMARY     Angela Jon Patient Status:  Inpatient    1945 MRN [id number]   [facility] 4NW-A Attending Avelino Mattson, DO   Hosp Day # 9 PCP Bella Martínez MD     Date of Admission: 2020  Date o Lace+ Score: 72  59-90 High Risk  29-58 Medium Risk  0-28   Low Risk         TCM Follow-Up Recommendation:  LACE > 58:  High Risk of readmission after discharge from the hospital.    Procedures during hospitalization:   • G-tube placement    Incidental Tartrate 25 MG Tabs  · nystatin 365406 UNIT/ML Susp         [place]    Follow-up appointment:   No follow-up provider specified.   Appointments for Next 30 Days 12/7/2020 - 1/6/2021    None          Vital signs:  Temp:  [97.2 °F (36.2 °C)-98.9 °

## 2020-12-07 NOTE — CM/SW NOTE
MARIOLA informed Yahaira from [facility] that the patient is scheduled for discharge today. She will contact family for delivery. MSW also notified [facility]  M:[phone]  F:[phone] of discharge today.     Delta Dodd LCSW

## 2020-12-07 NOTE — SLP NOTE
SPEECH DAILY NOTE - INPATIENT    ASSESSMENT & PLAN   ASSESSMENT  Patient seen for ongoing dysphagia intervention at bedside. Clinical discussion with RN completed and reported Pt is for d/c home today. Pt received awake, alert, and cooperative.  HEP provide

## 2020-12-07 NOTE — DIETARY NOTE
1230 [facility] ASSESSMENT    Pt meets moderate malnutrition criteria.     CRITERIA FOR MALNUTRITION DIAGNOSIS:  Criteria for non-severe malnutrition diagnosis: chronic illness related to energy intake less than75% for greater than 1 Pt had urgency of bowels on 12/2 and colace was cancelled. Gave TF reccs above. 11/29 - 75/M admitted with Hypoxemia. PMH includes SCC of throat, with surgery. Pt known to service from previous visits. Pt unable to be seen due to COVID restrictions. without signs/symptoms of intolerance (abdominal discomfort/distention)- (goal met)  4.  Alternative means of nutrition at goal to meet 100% patient nutrition prescription- (goal met)    MEDICATIONS:  Nystatin, Colace    LABS:  Phos: 3.3, (12/6):Glu: 126, K

## 2020-12-07 NOTE — PROGRESS NOTES
Afebrile , awake , respirations easy , tube feeding w/o residual at goal  Rate , cleared by all services for dicharge , will be contact upon arrival home by [address] [place] [address]. for supportive instruction regarding medication administration and bolus feedi

## 2020-12-07 NOTE — CM/SW NOTE
MSW received signed tube feeding form for [place] and faxed to [address] [place] [address]. at [facility], [facility]

## 2020-12-07 NOTE — PLAN OF CARE
Pt is AOX4, VSS. Pt complains of oral thrush, nystatin administered per orders. Pt is on 1L of oxygen PRN, 02 sats WDL. Pt is on telemetry and converts between NSR and afib. Pt has a history of afib, Eliquis administered per orders.  No bowel movement this

## 2021-01-15 ENCOUNTER — TELEPHONE (OUTPATIENT)
Dept: CARDIOLOGY | Age: 76
End: 2021-01-15

## 2021-01-26 ENCOUNTER — TELEPHONE (OUTPATIENT)
Dept: SCHEDULING | Age: 76
End: 2021-01-26

## 2021-01-28 ENCOUNTER — TELEPHONE (OUTPATIENT)
Dept: SCHEDULING | Age: 76
End: 2021-01-28

## 2021-01-30 ENCOUNTER — IMMUNIZATION (OUTPATIENT)
Dept: LAB | Age: 76
End: 2021-01-30

## 2021-01-30 DIAGNOSIS — Z23 NEED FOR VACCINATION: Primary | ICD-10-CM

## 2021-01-30 PROCEDURE — 91301 COVID-19 MODERNA VACCINE: CPT

## 2021-01-30 PROCEDURE — 0011A COVID-19 MODERNA VACCINE: CPT

## 2021-02-01 ENCOUNTER — OFFICE VISIT (OUTPATIENT)
Dept: CARDIOLOGY | Age: 76
End: 2021-02-01

## 2021-02-01 VITALS
WEIGHT: 133 LBS | HEIGHT: 69 IN | BODY MASS INDEX: 19.7 KG/M2 | SYSTOLIC BLOOD PRESSURE: 100 MMHG | DIASTOLIC BLOOD PRESSURE: 62 MMHG | HEART RATE: 91 BPM

## 2021-02-01 DIAGNOSIS — Z79.01 CURRENT USE OF LONG TERM ANTICOAGULATION: ICD-10-CM

## 2021-02-01 DIAGNOSIS — Z98.890 H/O MITRAL VALVE REPAIR: ICD-10-CM

## 2021-02-01 DIAGNOSIS — I65.23 ASYMPTOMATIC CAROTID ARTERY STENOSIS, BILATERAL: Primary | ICD-10-CM

## 2021-02-01 DIAGNOSIS — J44.9 CHRONIC OBSTRUCTIVE PULMONARY DISEASE, UNSPECIFIED COPD TYPE (CMD): ICD-10-CM

## 2021-02-01 DIAGNOSIS — I25.10 CORONARY ARTERY DISEASE INVOLVING NATIVE CORONARY ARTERY OF NATIVE HEART WITHOUT ANGINA PECTORIS: ICD-10-CM

## 2021-02-01 DIAGNOSIS — Z95.3 HISTORY OF AORTIC VALVE REPLACEMENT WITH BIOPROSTHETIC VALVE: ICD-10-CM

## 2021-02-01 DIAGNOSIS — I48.20 CHRONIC ATRIAL FIBRILLATION (CMD): ICD-10-CM

## 2021-02-01 PROCEDURE — 99214 OFFICE O/P EST MOD 30 MIN: CPT | Performed by: NURSE PRACTITIONER

## 2021-02-01 ASSESSMENT — PATIENT HEALTH QUESTIONNAIRE - PHQ9
2. FEELING DOWN, DEPRESSED OR HOPELESS: NOT AT ALL
SUM OF ALL RESPONSES TO PHQ9 QUESTIONS 1 AND 2: 0
SUM OF ALL RESPONSES TO PHQ9 QUESTIONS 1 AND 2: 0
1. LITTLE INTEREST OR PLEASURE IN DOING THINGS: NOT AT ALL
CLINICAL INTERPRETATION OF PHQ9 SCORE: NO FURTHER SCREENING NEEDED
CLINICAL INTERPRETATION OF PHQ2 SCORE: NO FURTHER SCREENING NEEDED

## 2021-02-01 ASSESSMENT — ENCOUNTER SYMPTOMS
GASTROINTESTINAL NEGATIVE: 1
WEIGHT LOSS: 0
SYNCOPE: 0
NEUROLOGICAL NEGATIVE: 1
SHORTNESS OF BREATH: 0
DECREASED APPETITE: 0
WEIGHT GAIN: 0
DIAPHORESIS: 0

## 2021-03-07 ENCOUNTER — IMMUNIZATION (OUTPATIENT)
Dept: LAB | Age: 76
End: 2021-03-07

## 2021-03-07 DIAGNOSIS — Z23 NEED FOR VACCINATION: Primary | ICD-10-CM

## 2021-03-07 PROCEDURE — 91301 COVID-19 MODERNA VACCINE: CPT

## 2021-03-07 PROCEDURE — 0011A COVID-19 MODERNA VACCINE: CPT

## 2021-06-01 ENCOUNTER — OFFICE VISIT (OUTPATIENT)
Dept: CARDIOLOGY | Age: 76
End: 2021-06-01

## 2021-06-01 VITALS
HEART RATE: 76 BPM | HEIGHT: 70 IN | BODY MASS INDEX: 20.04 KG/M2 | SYSTOLIC BLOOD PRESSURE: 90 MMHG | WEIGHT: 140 LBS | DIASTOLIC BLOOD PRESSURE: 60 MMHG

## 2021-06-01 DIAGNOSIS — Z79.01 CURRENT USE OF LONG TERM ANTICOAGULATION: ICD-10-CM

## 2021-06-01 DIAGNOSIS — I48.20 CHRONIC ATRIAL FIBRILLATION (CMD): ICD-10-CM

## 2021-06-01 DIAGNOSIS — I25.10 CORONARY ARTERY DISEASE INVOLVING NATIVE CORONARY ARTERY OF NATIVE HEART WITHOUT ANGINA PECTORIS: ICD-10-CM

## 2021-06-01 DIAGNOSIS — I65.23 ASYMPTOMATIC CAROTID ARTERY STENOSIS, BILATERAL: ICD-10-CM

## 2021-06-01 DIAGNOSIS — N18.32 CHRONIC RENAL IMPAIRMENT, STAGE 3B (CMD): ICD-10-CM

## 2021-06-01 DIAGNOSIS — Z95.3 HISTORY OF AORTIC VALVE REPLACEMENT WITH BIOPROSTHETIC VALVE: ICD-10-CM

## 2021-06-01 DIAGNOSIS — Z98.890 H/O MITRAL VALVE REPAIR: ICD-10-CM

## 2021-06-01 DIAGNOSIS — E78.00 PURE HYPERCHOLESTEROLEMIA: ICD-10-CM

## 2021-06-01 DIAGNOSIS — I42.0 DILATED CARDIOMYOPATHY (CMD): Primary | ICD-10-CM

## 2021-06-01 PROCEDURE — 99215 OFFICE O/P EST HI 40 MIN: CPT | Performed by: INTERNAL MEDICINE

## 2021-06-01 ASSESSMENT — PATIENT HEALTH QUESTIONNAIRE - PHQ9
SUM OF ALL RESPONSES TO PHQ9 QUESTIONS 1 AND 2: 0
1. LITTLE INTEREST OR PLEASURE IN DOING THINGS: NOT AT ALL
CLINICAL INTERPRETATION OF PHQ2 SCORE: NO FURTHER SCREENING NEEDED
CLINICAL INTERPRETATION OF PHQ2 SCORE: NO FURTHER SCREENING NEEDED
2. FEELING DOWN, DEPRESSED OR HOPELESS: NOT AT ALL
2. FEELING DOWN, DEPRESSED OR HOPELESS: NOT AT ALL
IS THE PATIENT ABLE TO COGNITIVELY COMPLETE THE PHQ9: NO
SUM OF ALL RESPONSES TO PHQ9 QUESTIONS 1 AND 2: 0
SUM OF ALL RESPONSES TO PHQ9 QUESTIONS 1 AND 2: 0
CLINICAL INTERPRETATION OF PHQ9 SCORE: NO FURTHER SCREENING NEEDED
PATIENT UNABLE TO COMPLETE PHQ: NO
1. LITTLE INTEREST OR PLEASURE IN DOING THINGS: NOT AT ALL

## 2021-08-03 ENCOUNTER — APPOINTMENT (OUTPATIENT)
Dept: CARDIOLOGY | Age: 76
End: 2021-08-03

## 2021-11-11 ENCOUNTER — TELEPHONE (OUTPATIENT)
Dept: SCHEDULING | Age: 76
End: 2021-11-11

## 2021-11-19 ENCOUNTER — HOSPITAL ENCOUNTER (OUTPATIENT)
Dept: GENERAL RADIOLOGY | Age: 76
Discharge: HOME | End: 2021-11-19
Attending: INTERNAL MEDICINE
Payer: MEDICARE

## 2021-11-19 ENCOUNTER — OFFICE VISIT (OUTPATIENT)
Dept: OTHER | Facility: HOSPITAL | Age: 76
End: 2021-11-19
Attending: PREVENTIVE MEDICINE
Payer: MEDICARE

## 2021-11-19 DIAGNOSIS — Z00.00 ANNUAL PHYSICAL EXAM: Primary | ICD-10-CM

## 2021-11-19 DIAGNOSIS — J44.9 CHRONIC OBSTRUCTIVE PULMONARY DISEASE (HCC): ICD-10-CM

## 2021-11-19 DIAGNOSIS — R06.02 SHORTNESS OF BREATH: ICD-10-CM

## 2021-11-19 PROCEDURE — 80061 LIPID PANEL: CPT

## 2021-11-19 PROCEDURE — 80053 COMPREHEN METABOLIC PANEL: CPT

## 2021-11-19 PROCEDURE — 84153 ASSAY OF PSA TOTAL: CPT

## 2021-11-19 PROCEDURE — 71046 X-RAY EXAM CHEST 2 VIEWS: CPT | Performed by: INTERNAL MEDICINE

## 2021-11-19 PROCEDURE — 81001 URINALYSIS AUTO W/SCOPE: CPT

## 2021-11-19 PROCEDURE — 93005 ELECTROCARDIOGRAM TRACING: CPT

## 2021-11-19 PROCEDURE — 85025 COMPLETE CBC W/AUTO DIFF WBC: CPT

## 2022-01-14 DIAGNOSIS — Z01.812 PRE-PROCEDURAL LABORATORY EXAMINATION: Primary | ICD-10-CM

## 2022-01-16 ENCOUNTER — LAB SERVICES (OUTPATIENT)
Dept: LAB | Age: 77
End: 2022-01-16

## 2022-01-16 DIAGNOSIS — Z01.812 PRE-PROCEDURAL LABORATORY EXAMINATION: ICD-10-CM

## 2022-01-16 LAB
SARS-COV-2 RNA RESP QL NAA+PROBE: NOT DETECTED
SERVICE CMNT-IMP: NORMAL
SERVICE CMNT-IMP: NORMAL

## 2022-01-16 PROCEDURE — U0005 INFEC AGEN DETEC AMPLI PROBE: HCPCS | Performed by: CLINICAL MEDICAL LABORATORY

## 2022-01-16 PROCEDURE — U0003 INFECTIOUS AGENT DETECTION BY NUCLEIC ACID (DNA OR RNA); SEVERE ACUTE RESPIRATORY SYNDROME CORONAVIRUS 2 (SARS-COV-2) (CORONAVIRUS DISEASE [COVID-19]), AMPLIFIED PROBE TECHNIQUE, MAKING USE OF HIGH THROUGHPUT TECHNOLOGIES AS DESCRIBED BY CMS-2020-01-R: HCPCS | Performed by: CLINICAL MEDICAL LABORATORY

## 2022-01-17 ASSESSMENT — ACTIVITIES OF DAILY LIVING (ADL)
NEEDS_ASSIST: NO
ADL_SHORT_OF_BREATH: NO
ADL_BEFORE_ADMISSION: INDEPENDENT
HISTORY OF FALLING IN THE LAST YEAR (PRIOR TO ADMISSION): NO
ADL_SCORE: 12
SENSORY_SUPPORT_DEVICES: HEARING AIDS;DENTURES;EYEGLASSES
RECENT_DECLINE_ADL: NO

## 2022-01-17 ASSESSMENT — COGNITIVE AND FUNCTIONAL STATUS - GENERAL: ARE YOU DEAF OR DO YOU HAVE SERIOUS DIFFICULTY  HEARING: YES

## 2022-01-18 ENCOUNTER — HOSPITAL ENCOUNTER (OUTPATIENT)
Dept: GASTROENTEROLOGY | Age: 77
Discharge: HOME | End: 2022-01-18
Attending: INTERNAL MEDICINE

## 2022-01-18 ENCOUNTER — ANESTHESIA (OUTPATIENT)
Dept: GASTROENTEROLOGY | Age: 77
End: 2022-01-18

## 2022-01-18 ENCOUNTER — ANESTHESIA EVENT (OUTPATIENT)
Dept: GASTROENTEROLOGY | Age: 77
End: 2022-01-18

## 2022-01-18 VITALS
OXYGEN SATURATION: 96 % | TEMPERATURE: 97.5 F | SYSTOLIC BLOOD PRESSURE: 126 MMHG | HEART RATE: 78 BPM | HEIGHT: 70 IN | BODY MASS INDEX: 20.67 KG/M2 | RESPIRATION RATE: 16 BRPM | WEIGHT: 144.4 LBS | DIASTOLIC BLOOD PRESSURE: 80 MMHG

## 2022-01-18 DIAGNOSIS — C76.0 MALIGNANT NEOPLASM OF HEAD, FACE, AND NECK (CMD): ICD-10-CM

## 2022-01-18 PROCEDURE — 10002800 HB RX 250 W HCPCS: Performed by: ANESTHESIOLOGY

## 2022-01-18 PROCEDURE — 10002807 HB RX 258: Performed by: INTERNAL MEDICINE

## 2022-01-18 PROCEDURE — 13000001 HB PHASE II RECOVERY EA 30 MINUTES

## 2022-01-18 PROCEDURE — 13000008 HB ANESTHESIA MAC OUTSIDE OR

## 2022-01-18 PROCEDURE — 10004451 HB PACU RECOVERY 1ST 30 MINUTES

## 2022-01-18 PROCEDURE — 13000028 HB GI MAJOR COMPLEX CASE S/U + 1ST 15 MIN

## 2022-01-18 PROCEDURE — 10006023 HB SUPPLY 272

## 2022-01-18 PROCEDURE — 10002801 HB RX 250 W/O HCPCS: Performed by: ANESTHESIOLOGY

## 2022-01-18 RX ORDER — PROPOFOL 10 MG/ML
INJECTION, EMULSION INTRAVENOUS PRN
Status: DISCONTINUED | OUTPATIENT
Start: 2022-01-18 | End: 2022-01-18

## 2022-01-18 RX ORDER — MIDAZOLAM HYDROCHLORIDE 1 MG/ML
INJECTION, SOLUTION INTRAMUSCULAR; INTRAVENOUS PRN
Status: DISCONTINUED | OUTPATIENT
Start: 2022-01-18 | End: 2022-01-18

## 2022-01-18 RX ORDER — SODIUM CHLORIDE 9 MG/ML
INJECTION, SOLUTION INTRAVENOUS CONTINUOUS
Status: DISCONTINUED | OUTPATIENT
Start: 2022-01-18 | End: 2022-01-20 | Stop reason: HOSPADM

## 2022-01-18 RX ADMIN — PROPOFOL 75 MCG/KG/MIN: 10 INJECTION, EMULSION INTRAVENOUS at 11:53

## 2022-01-18 RX ADMIN — MIDAZOLAM HYDROCHLORIDE 2 MG: 1 INJECTION, SOLUTION INTRAMUSCULAR; INTRAVENOUS at 11:53

## 2022-01-18 RX ADMIN — PROPOFOL 30 MG: 10 INJECTION, EMULSION INTRAVENOUS at 11:53

## 2022-01-18 RX ADMIN — SODIUM CHLORIDE: 9 INJECTION, SOLUTION INTRAVENOUS at 10:40

## 2022-01-18 RX ADMIN — FENTANYL CITRATE 100 MCG: 50 INJECTION INTRAMUSCULAR; INTRAVENOUS at 11:53

## 2022-01-18 ASSESSMENT — ACTIVITIES OF DAILY LIVING (ADL)
ADL_SCORE: 12
ADL_BEFORE_ADMISSION: INDEPENDENT
CHRONIC_PAIN_PRESENT: NO

## 2022-01-18 ASSESSMENT — PAIN SCALES - GENERAL
PAINLEVEL_OUTOF10: 0

## 2022-01-18 ASSESSMENT — PAIN SCALES - WONG BAKER: WONGBAKER_NUMERICALRESPONSE: 0

## 2022-01-18 ASSESSMENT — COPD QUESTIONNAIRES: COPD: 1

## 2022-05-10 ENCOUNTER — LAB ENCOUNTER (OUTPATIENT)
Dept: LAB | Age: 77
End: 2022-05-10
Attending: INTERNAL MEDICINE
Payer: MEDICARE

## 2022-05-10 DIAGNOSIS — I25.10 CORONARY ATHEROSCLEROSIS OF NATIVE CORONARY ARTERY: Primary | ICD-10-CM

## 2022-05-10 DIAGNOSIS — I42.0 CONGESTIVE CARDIOMYOPATHY (HCC): ICD-10-CM

## 2022-05-10 DIAGNOSIS — E78.00 PURE HYPERCHOLESTEROLEMIA: ICD-10-CM

## 2022-05-10 LAB
ALBUMIN SERPL-MCNC: 3.6 G/DL (ref 3.4–5)
ALBUMIN/GLOB SERPL: 0.8 {RATIO} (ref 1–2)
ALP LIVER SERPL-CCNC: 113 U/L
ALT SERPL-CCNC: 30 U/L
ANION GAP SERPL CALC-SCNC: 6 MMOL/L (ref 0–18)
AST SERPL-CCNC: 26 U/L (ref 15–37)
BILIRUB SERPL-MCNC: 1.2 MG/DL (ref 0.1–2)
BUN BLD-MCNC: 27 MG/DL (ref 7–18)
CALCIUM BLD-MCNC: 10.1 MG/DL (ref 8.5–10.1)
CHLORIDE SERPL-SCNC: 103 MMOL/L (ref 98–112)
CHOLEST SERPL-MCNC: 164 MG/DL (ref ?–200)
CO2 SERPL-SCNC: 30 MMOL/L (ref 21–32)
CREAT BLD-MCNC: 1.17 MG/DL
FASTING PATIENT LIPID ANSWER: YES
FASTING STATUS PATIENT QL REPORTED: YES
GLOBULIN PLAS-MCNC: 4.3 G/DL (ref 2.8–4.4)
GLUCOSE BLD-MCNC: 108 MG/DL (ref 70–99)
HDLC SERPL-MCNC: 62 MG/DL (ref 40–59)
LDLC SERPL CALC-MCNC: 91 MG/DL (ref ?–100)
NONHDLC SERPL-MCNC: 102 MG/DL (ref ?–130)
OSMOLALITY SERPL CALC.SUM OF ELEC: 294 MOSM/KG (ref 275–295)
POTASSIUM SERPL-SCNC: 3.9 MMOL/L (ref 3.5–5.1)
PROT SERPL-MCNC: 7.9 G/DL (ref 6.4–8.2)
SODIUM SERPL-SCNC: 139 MMOL/L (ref 136–145)
TRIGL SERPL-MCNC: 57 MG/DL (ref 30–149)
VLDLC SERPL CALC-MCNC: 9 MG/DL (ref 0–30)

## 2022-05-10 PROCEDURE — 80053 COMPREHEN METABOLIC PANEL: CPT

## 2022-05-10 PROCEDURE — 80061 LIPID PANEL: CPT

## 2022-09-15 RX ORDER — MIRTAZAPINE 15 MG/1
15 TABLET, FILM COATED ORAL AT BEDTIME
COMMUNITY

## 2022-09-15 RX ORDER — ALPRAZOLAM 0.5 MG/1
0.5 TABLET ORAL DAILY
COMMUNITY
End: 2023-05-02 | Stop reason: SDUPTHER

## 2022-09-15 RX ORDER — BENZONATATE 200 MG/1
200 CAPSULE ORAL 3 TIMES DAILY PRN
COMMUNITY
End: 2023-02-10 | Stop reason: ALTCHOICE

## 2022-09-22 ENCOUNTER — OFFICE VISIT (OUTPATIENT)
Dept: FAMILY MEDICINE | Age: 77
End: 2022-09-22

## 2022-09-22 VITALS
HEIGHT: 70 IN | RESPIRATION RATE: 18 BRPM | DIASTOLIC BLOOD PRESSURE: 79 MMHG | HEART RATE: 98 BPM | TEMPERATURE: 98 F | OXYGEN SATURATION: 97 % | SYSTOLIC BLOOD PRESSURE: 114 MMHG | BODY MASS INDEX: 19.62 KG/M2 | WEIGHT: 137.02 LBS

## 2022-09-22 DIAGNOSIS — Z79.01 CURRENT USE OF LONG TERM ANTICOAGULATION: ICD-10-CM

## 2022-09-22 DIAGNOSIS — I48.20 CHRONIC ATRIAL FIBRILLATION (CMD): ICD-10-CM

## 2022-09-22 DIAGNOSIS — J44.9 CHRONIC OBSTRUCTIVE PULMONARY DISEASE, UNSPECIFIED COPD TYPE (CMD): ICD-10-CM

## 2022-09-22 DIAGNOSIS — E03.9 ACQUIRED HYPOTHYROIDISM: Primary | ICD-10-CM

## 2022-09-22 DIAGNOSIS — N18.32 CHRONIC RENAL IMPAIRMENT, STAGE 3B (CMD): ICD-10-CM

## 2022-09-22 DIAGNOSIS — C09.9 TONSILLAR CANCER (CMD): ICD-10-CM

## 2022-09-22 DIAGNOSIS — I10 BENIGN ESSENTIAL HTN: ICD-10-CM

## 2022-09-22 DIAGNOSIS — E78.00 PURE HYPERCHOLESTEROLEMIA: ICD-10-CM

## 2022-09-22 DIAGNOSIS — I42.0 DILATED CARDIOMYOPATHY (CMD): ICD-10-CM

## 2022-09-22 DIAGNOSIS — Z98.890 H/O MITRAL VALVE REPAIR: ICD-10-CM

## 2022-09-22 PROCEDURE — 3078F DIAST BP <80 MM HG: CPT | Performed by: FAMILY MEDICINE

## 2022-09-22 PROCEDURE — 3074F SYST BP LT 130 MM HG: CPT | Performed by: FAMILY MEDICINE

## 2022-09-22 PROCEDURE — 99214 OFFICE O/P EST MOD 30 MIN: CPT | Performed by: FAMILY MEDICINE

## 2022-09-22 RX ORDER — LEVOTHYROXINE SODIUM 0.03 MG/1
25 TABLET ORAL DAILY
Qty: 30 TABLET | Refills: 5 | Status: SHIPPED | OUTPATIENT
Start: 2022-09-22 | End: 2023-04-25

## 2022-09-22 ASSESSMENT — PAIN SCALES - GENERAL: PAINLEVEL: 0

## 2022-09-22 ASSESSMENT — ENCOUNTER SYMPTOMS
SHORTNESS OF BREATH: 0
FATIGUE: 0
ABDOMINAL PAIN: 0

## 2022-09-22 ASSESSMENT — PATIENT HEALTH QUESTIONNAIRE - PHQ9
SUM OF ALL RESPONSES TO PHQ9 QUESTIONS 1 AND 2: 0
SUM OF ALL RESPONSES TO PHQ9 QUESTIONS 1 AND 2: 0
2. FEELING DOWN, DEPRESSED OR HOPELESS: NOT AT ALL
CLINICAL INTERPRETATION OF PHQ2 SCORE: NO FURTHER SCREENING NEEDED
1. LITTLE INTEREST OR PLEASURE IN DOING THINGS: NOT AT ALL

## 2022-10-11 NOTE — PROGRESS NOTES
[facility]  Neurosurgery Progress Note    Coby Jerry Patient Status:  Inpatient    1945 MRN [id number]   [facility] 6NE-A Attending Geovanna Foreman MD   Hosp Day # 1 PCP Lucy Live MD     Chief Complaint:  Traumatic [facility] Counseling:  I discussed with the patient the risks of etanercept including but not limited to myelosuppression, immunosuppression, autoimmune hepatitis, demyelinating diseases, lymphoma, and infections.  The patient understands that monitoring is required including a PPD at baseline and must alert us or the primary physician if symptoms of infection or other concerning signs are noted.

## 2022-11-22 ENCOUNTER — OFFICE VISIT (OUTPATIENT)
Dept: OTHER | Facility: HOSPITAL | Age: 77
End: 2022-11-22
Attending: PREVENTIVE MEDICINE

## 2022-11-22 DIAGNOSIS — Z00.00 ANNUAL PHYSICAL EXAM: Primary | ICD-10-CM

## 2022-11-22 LAB
ALBUMIN SERPL-MCNC: 3.6 G/DL (ref 3.4–5)
ALBUMIN/GLOB SERPL: 0.8 {RATIO} (ref 1–2)
ALP LIVER SERPL-CCNC: 123 U/L
ALT SERPL-CCNC: 36 U/L
ANION GAP SERPL CALC-SCNC: 5 MMOL/L (ref 0–18)
AST SERPL-CCNC: 26 U/L (ref 15–37)
ATRIAL RATE: 220 BPM
BASOPHILS # BLD AUTO: 0.04 X10(3) UL (ref 0–0.2)
BASOPHILS NFR BLD AUTO: 1.3 %
BILIRUB SERPL-MCNC: 1.5 MG/DL (ref 0.1–2)
BILIRUB UR QL STRIP.AUTO: NEGATIVE
BUN BLD-MCNC: 24 MG/DL (ref 7–18)
CALCIUM BLD-MCNC: 9.7 MG/DL (ref 8.5–10.1)
CHLORIDE SERPL-SCNC: 103 MMOL/L (ref 98–112)
CHOLEST SERPL-MCNC: 159 MG/DL (ref ?–200)
CLARITY UR REFRACT.AUTO: CLEAR
CO2 SERPL-SCNC: 30 MMOL/L (ref 21–32)
COLOR UR AUTO: YELLOW
CREAT BLD-MCNC: 1.04 MG/DL
EOSINOPHIL # BLD AUTO: 0.26 X10(3) UL (ref 0–0.7)
EOSINOPHIL NFR BLD AUTO: 8.3 %
ERYTHROCYTE [DISTWIDTH] IN BLOOD BY AUTOMATED COUNT: 12.7 %
FASTING PATIENT LIPID ANSWER: YES
FASTING STATUS PATIENT QL REPORTED: YES
GFR SERPLBLD BASED ON 1.73 SQ M-ARVRAT: 73 ML/MIN/1.73M2 (ref 60–?)
GLOBULIN PLAS-MCNC: 4.3 G/DL (ref 2.8–4.4)
GLUCOSE BLD-MCNC: 100 MG/DL (ref 70–99)
GLUCOSE UR STRIP.AUTO-MCNC: NEGATIVE MG/DL
HCT VFR BLD AUTO: 42.8 %
HDLC SERPL-MCNC: 68 MG/DL (ref 40–59)
HGB BLD-MCNC: 14.1 G/DL
IMM GRANULOCYTES # BLD AUTO: 0.01 X10(3) UL (ref 0–1)
IMM GRANULOCYTES NFR BLD: 0.3 %
KETONES UR STRIP.AUTO-MCNC: NEGATIVE MG/DL
LDLC SERPL CALC-MCNC: 78 MG/DL (ref ?–100)
LEUKOCYTE ESTERASE UR QL STRIP.AUTO: NEGATIVE
LYMPHOCYTES # BLD AUTO: 0.49 X10(3) UL (ref 1–4)
LYMPHOCYTES NFR BLD AUTO: 15.7 %
MCH RBC QN AUTO: 33.2 PG (ref 26–34)
MCHC RBC AUTO-ENTMCNC: 32.9 G/DL (ref 31–37)
MCV RBC AUTO: 100.7 FL
MONOCYTES # BLD AUTO: 0.48 X10(3) UL (ref 0.1–1)
MONOCYTES NFR BLD AUTO: 15.3 %
NEUTROPHILS # BLD AUTO: 1.85 X10 (3) UL (ref 1.5–7.7)
NEUTROPHILS # BLD AUTO: 1.85 X10(3) UL (ref 1.5–7.7)
NEUTROPHILS NFR BLD AUTO: 59.1 %
NITRITE UR QL STRIP.AUTO: NEGATIVE
NONHDLC SERPL-MCNC: 91 MG/DL (ref ?–130)
OSMOLALITY SERPL CALC.SUM OF ELEC: 290 MOSM/KG (ref 275–295)
PH UR STRIP.AUTO: 6 [PH] (ref 5–8)
PLATELET # BLD AUTO: 118 10(3)UL (ref 150–450)
POTASSIUM SERPL-SCNC: 3.9 MMOL/L (ref 3.5–5.1)
PROT SERPL-MCNC: 7.9 G/DL (ref 6.4–8.2)
PROT UR STRIP.AUTO-MCNC: NEGATIVE MG/DL
PSA SERPL-MCNC: 2.83 NG/ML (ref ?–4)
Q-T INTERVAL: 360 MS
QRS DURATION: 80 MS
QTC CALCULATION (BEZET): 457 MS
R AXIS: -24 DEGREES
RBC # BLD AUTO: 4.25 X10(6)UL
SODIUM SERPL-SCNC: 138 MMOL/L (ref 136–145)
SP GR UR STRIP.AUTO: 1.02 (ref 1–1.03)
T AXIS: 26 DEGREES
TRIGL SERPL-MCNC: 65 MG/DL (ref 30–149)
UROBILINOGEN UR STRIP.AUTO-MCNC: 0.2 MG/DL
VENTRICULAR RATE: 97 BPM
VLDLC SERPL CALC-MCNC: 10 MG/DL (ref 0–30)
WBC # BLD AUTO: 3.1 X10(3) UL (ref 4–11)

## 2022-11-22 PROCEDURE — 85025 COMPLETE CBC W/AUTO DIFF WBC: CPT

## 2022-11-22 PROCEDURE — 93005 ELECTROCARDIOGRAM TRACING: CPT

## 2022-11-22 PROCEDURE — 80053 COMPREHEN METABOLIC PANEL: CPT

## 2022-11-22 PROCEDURE — 84153 ASSAY OF PSA TOTAL: CPT

## 2022-11-22 PROCEDURE — 80061 LIPID PANEL: CPT

## 2022-11-22 PROCEDURE — 81015 MICROSCOPIC EXAM OF URINE: CPT

## 2022-11-22 PROCEDURE — 81001 URINALYSIS AUTO W/SCOPE: CPT

## 2023-02-10 ENCOUNTER — TELEPHONE (OUTPATIENT)
Dept: FAMILY MEDICINE | Age: 78
End: 2023-02-10

## 2023-02-10 ENCOUNTER — OFFICE VISIT (OUTPATIENT)
Dept: INTERNAL MEDICINE | Age: 78
End: 2023-02-10

## 2023-02-10 VITALS
SYSTOLIC BLOOD PRESSURE: 126 MMHG | BODY MASS INDEX: 19.8 KG/M2 | DIASTOLIC BLOOD PRESSURE: 86 MMHG | HEIGHT: 70 IN | RESPIRATION RATE: 16 BRPM | HEART RATE: 111 BPM | OXYGEN SATURATION: 97 % | WEIGHT: 138.34 LBS

## 2023-02-10 DIAGNOSIS — C09.9 TONSILLAR CANCER (CMD): ICD-10-CM

## 2023-02-10 DIAGNOSIS — Z23 NEED FOR VACCINATION: ICD-10-CM

## 2023-02-10 DIAGNOSIS — I42.0 DILATED CARDIOMYOPATHY (CMD): ICD-10-CM

## 2023-02-10 DIAGNOSIS — H65.91 RIGHT NON-SUPPURATIVE OTITIS MEDIA: Primary | ICD-10-CM

## 2023-02-10 DIAGNOSIS — I48.20 CHRONIC ATRIAL FIBRILLATION (CMD): ICD-10-CM

## 2023-02-10 DIAGNOSIS — N18.32 CHRONIC RENAL IMPAIRMENT, STAGE 3B (CMD): ICD-10-CM

## 2023-02-10 DIAGNOSIS — J44.9 CHRONIC OBSTRUCTIVE PULMONARY DISEASE, UNSPECIFIED COPD TYPE (CMD): ICD-10-CM

## 2023-02-10 PROCEDURE — 3074F SYST BP LT 130 MM HG: CPT | Performed by: INTERNAL MEDICINE

## 2023-02-10 PROCEDURE — 99212 OFFICE O/P EST SF 10 MIN: CPT | Performed by: INTERNAL MEDICINE

## 2023-02-10 PROCEDURE — G0009 ADMIN PNEUMOCOCCAL VACCINE: HCPCS | Performed by: INTERNAL MEDICINE

## 2023-02-10 PROCEDURE — 90677 PCV20 VACCINE IM: CPT | Performed by: INTERNAL MEDICINE

## 2023-02-10 PROCEDURE — 3079F DIAST BP 80-89 MM HG: CPT | Performed by: INTERNAL MEDICINE

## 2023-02-10 RX ORDER — OFLOXACIN 3 MG/ML
5 SOLUTION AURICULAR (OTIC) DAILY
Qty: 5 ML | Refills: 0 | Status: SHIPPED | OUTPATIENT
Start: 2023-02-10 | End: 2023-11-03 | Stop reason: ALTCHOICE

## 2023-02-10 RX ORDER — AZITHROMYCIN 250 MG/1
TABLET, FILM COATED ORAL
Qty: 6 TABLET | Refills: 0 | Status: SHIPPED | OUTPATIENT
Start: 2023-02-10 | End: 2023-02-15

## 2023-02-10 ASSESSMENT — ENCOUNTER SYMPTOMS
ALLERGIC/IMMUNOLOGIC NEGATIVE: 1
BACK PAIN: 1
GASTROINTESTINAL NEGATIVE: 1
PSYCHIATRIC NEGATIVE: 1
EYES NEGATIVE: 1
COUGH: 1
HEMATOLOGIC/LYMPHATIC NEGATIVE: 1
ENDOCRINE NEGATIVE: 1
WEAKNESS: 1
FATIGUE: 1

## 2023-02-15 ENCOUNTER — APPOINTMENT (OUTPATIENT)
Dept: GENERAL RADIOLOGY | Age: 78
End: 2023-02-15

## 2023-02-15 ENCOUNTER — HOSPITAL ENCOUNTER (EMERGENCY)
Age: 78
Discharge: HOME | End: 2023-02-15
Attending: EMERGENCY MEDICINE

## 2023-02-15 VITALS
WEIGHT: 140.87 LBS | SYSTOLIC BLOOD PRESSURE: 143 MMHG | RESPIRATION RATE: 18 BRPM | DIASTOLIC BLOOD PRESSURE: 80 MMHG | OXYGEN SATURATION: 100 % | HEART RATE: 102 BPM | HEIGHT: 70 IN | BODY MASS INDEX: 20.17 KG/M2 | TEMPERATURE: 97.8 F

## 2023-02-15 DIAGNOSIS — T85.528A DISLODGED GASTROSTOMY TUBE: Primary | ICD-10-CM

## 2023-02-15 PROCEDURE — 10002805 HB CONTRAST AGENT: Performed by: PHYSICIAN ASSISTANT

## 2023-02-15 PROCEDURE — 49465 FLUORO EXAM OF G/COLON TUBE: CPT

## 2023-02-15 PROCEDURE — 43762 RPLC GTUBE NO REVJ TRC: CPT

## 2023-02-15 PROCEDURE — 99283 EMERGENCY DEPT VISIT LOW MDM: CPT

## 2023-02-15 RX ADMIN — IOHEXOL 50 ML: 350 INJECTION, SOLUTION INTRAVENOUS at 16:21

## 2023-02-15 ASSESSMENT — ENCOUNTER SYMPTOMS
CHILLS: 0
FEVER: 0
SHORTNESS OF BREATH: 0
COUGH: 0

## 2023-04-25 RX ORDER — LEVOTHYROXINE SODIUM 0.03 MG/1
25 TABLET ORAL DAILY
Qty: 30 TABLET | Refills: 5 | Status: SHIPPED | OUTPATIENT
Start: 2023-04-25 | End: 2023-05-02 | Stop reason: SDUPTHER

## 2023-05-02 ENCOUNTER — LAB SERVICES (OUTPATIENT)
Dept: LAB | Age: 78
End: 2023-05-02

## 2023-05-02 ENCOUNTER — OFFICE VISIT (OUTPATIENT)
Dept: INTERNAL MEDICINE | Age: 78
End: 2023-05-02

## 2023-05-02 VITALS
WEIGHT: 136.24 LBS | DIASTOLIC BLOOD PRESSURE: 78 MMHG | HEIGHT: 70 IN | BODY MASS INDEX: 19.51 KG/M2 | SYSTOLIC BLOOD PRESSURE: 121 MMHG | HEART RATE: 84 BPM | TEMPERATURE: 98 F | RESPIRATION RATE: 16 BRPM | OXYGEN SATURATION: 99 %

## 2023-05-02 DIAGNOSIS — I10 BENIGN ESSENTIAL HTN: ICD-10-CM

## 2023-05-02 DIAGNOSIS — C09.9 TONSILLAR CANCER (CMD): ICD-10-CM

## 2023-05-02 DIAGNOSIS — E03.9 ACQUIRED HYPOTHYROIDISM: ICD-10-CM

## 2023-05-02 DIAGNOSIS — E78.2 MIXED HYPERLIPIDEMIA: ICD-10-CM

## 2023-05-02 DIAGNOSIS — I48.20 CHRONIC ATRIAL FIBRILLATION (CMD): ICD-10-CM

## 2023-05-02 DIAGNOSIS — D64.9 ANEMIA, UNSPECIFIED TYPE: ICD-10-CM

## 2023-05-02 DIAGNOSIS — N18.32 CHRONIC RENAL IMPAIRMENT, STAGE 3B (CMD): ICD-10-CM

## 2023-05-02 DIAGNOSIS — J44.9 CHRONIC OBSTRUCTIVE PULMONARY DISEASE, UNSPECIFIED COPD TYPE (CMD): ICD-10-CM

## 2023-05-02 DIAGNOSIS — I70.0 ATHEROSCLEROSIS OF AORTA (CMD): ICD-10-CM

## 2023-05-02 DIAGNOSIS — I42.0 DILATED CARDIOMYOPATHY (CMD): ICD-10-CM

## 2023-05-02 DIAGNOSIS — Z00.00 MEDICARE ANNUAL WELLNESS VISIT, SUBSEQUENT: Primary | ICD-10-CM

## 2023-05-02 PROCEDURE — 80061 LIPID PANEL: CPT | Performed by: INTERNAL MEDICINE

## 2023-05-02 PROCEDURE — 85025 COMPLETE CBC W/AUTO DIFF WBC: CPT | Performed by: INTERNAL MEDICINE

## 2023-05-02 PROCEDURE — 84439 ASSAY OF FREE THYROXINE: CPT | Performed by: INTERNAL MEDICINE

## 2023-05-02 PROCEDURE — 80053 COMPREHEN METABOLIC PANEL: CPT | Performed by: INTERNAL MEDICINE

## 2023-05-02 PROCEDURE — 36415 COLL VENOUS BLD VENIPUNCTURE: CPT | Performed by: INTERNAL MEDICINE

## 2023-05-02 PROCEDURE — 99214 OFFICE O/P EST MOD 30 MIN: CPT | Performed by: INTERNAL MEDICINE

## 2023-05-02 PROCEDURE — G0103 PSA SCREENING: HCPCS | Performed by: INTERNAL MEDICINE

## 2023-05-02 PROCEDURE — G0439 PPPS, SUBSEQ VISIT: HCPCS | Performed by: INTERNAL MEDICINE

## 2023-05-02 PROCEDURE — 3074F SYST BP LT 130 MM HG: CPT | Performed by: INTERNAL MEDICINE

## 2023-05-02 PROCEDURE — 3078F DIAST BP <80 MM HG: CPT | Performed by: INTERNAL MEDICINE

## 2023-05-02 PROCEDURE — 84443 ASSAY THYROID STIM HORMONE: CPT | Performed by: INTERNAL MEDICINE

## 2023-05-02 RX ORDER — ALPRAZOLAM 0.5 MG/1
0.5 TABLET ORAL DAILY
Qty: 90 TABLET | Refills: 0 | Status: SHIPPED | OUTPATIENT
Start: 2023-05-02

## 2023-05-02 RX ORDER — TADALAFIL 10 MG/1
5 TABLET ORAL PRN
Qty: 30 TABLET | Refills: 3 | Status: SHIPPED | OUTPATIENT
Start: 2023-05-02 | End: 2023-07-28 | Stop reason: SDUPTHER

## 2023-05-02 RX ORDER — LEVOTHYROXINE SODIUM 0.03 MG/1
25 TABLET ORAL DAILY
Qty: 90 TABLET | Refills: 1 | Status: SHIPPED | OUTPATIENT
Start: 2023-05-02 | End: 2023-05-04 | Stop reason: DRUGHIGH

## 2023-05-02 ASSESSMENT — PATIENT HEALTH QUESTIONNAIRE - PHQ9
CLINICAL INTERPRETATION OF PHQ2 SCORE: NO FURTHER SCREENING NEEDED
SUM OF ALL RESPONSES TO PHQ9 QUESTIONS 1 AND 2: 0
1. LITTLE INTEREST OR PLEASURE IN DOING THINGS: NOT AT ALL
2. FEELING DOWN, DEPRESSED OR HOPELESS: NOT AT ALL
SUM OF ALL RESPONSES TO PHQ9 QUESTIONS 1 AND 2: 0

## 2023-05-02 ASSESSMENT — ENCOUNTER SYMPTOMS
ALLERGIC/IMMUNOLOGIC NEGATIVE: 1
PSYCHIATRIC NEGATIVE: 1
ENDOCRINE NEGATIVE: 1
EYES NEGATIVE: 1
HEMATOLOGIC/LYMPHATIC NEGATIVE: 1
FATIGUE: 1
NEUROLOGICAL NEGATIVE: 1
GASTROINTESTINAL NEGATIVE: 1
SHORTNESS OF BREATH: 1

## 2023-05-02 ASSESSMENT — MINI COG
PATIENT ABLE TO REPEAT THE 3 WORDS GIVEN PREVIOUSLY?: WAS ABLE TO REPEAT BACK 3 WORDS CORRECTLY
TOTAL SCORE: 5
PATIENT ABLE TO FILL IN THE CLOCK FACE WITH 10 MINUTES PAST 11 O'CLOCK?: YES, CLOCK IS CORRECT

## 2023-05-02 ASSESSMENT — PAIN SCALES - GENERAL: PAINLEVEL: 0

## 2023-05-03 LAB
ALBUMIN SERPL-MCNC: 3.7 G/DL (ref 3.6–5.1)
ALBUMIN/GLOB SERPL: 1.1 {RATIO} (ref 1–2.4)
ALP SERPL-CCNC: 115 UNITS/L (ref 45–117)
ALT SERPL-CCNC: 32 UNITS/L
ANION GAP SERPL CALC-SCNC: 10 MMOL/L (ref 7–19)
AST SERPL-CCNC: 26 UNITS/L
BASOPHILS # BLD: 0 K/MCL (ref 0–0.3)
BASOPHILS NFR BLD: 1 %
BILIRUB SERPL-MCNC: 1.3 MG/DL (ref 0.2–1)
BUN SERPL-MCNC: 25 MG/DL (ref 6–20)
BUN/CREAT SERPL: 34 (ref 7–25)
CALCIUM SERPL-MCNC: 9.6 MG/DL (ref 8.4–10.2)
CHLORIDE SERPL-SCNC: 102 MMOL/L (ref 97–110)
CHOLEST SERPL-MCNC: 159 MG/DL
CHOLEST/HDLC SERPL: 2.3 {RATIO}
CO2 SERPL-SCNC: 31 MMOL/L (ref 21–32)
CREAT SERPL-MCNC: 0.73 MG/DL (ref 0.67–1.17)
CREAT UR-MCNC: 82.1 MG/DL
DEPRECATED RDW RBC: 47.5 FL (ref 39–50)
EOSINOPHIL # BLD: 0.1 K/MCL (ref 0–0.5)
EOSINOPHIL NFR BLD: 3 %
ERYTHROCYTE [DISTWIDTH] IN BLOOD: 12.6 % (ref 11–15)
FASTING DURATION TIME PATIENT: ABNORMAL H
GFR SERPLBLD BASED ON 1.73 SQ M-ARVRAT: >90 ML/MIN
GLOBULIN SER-MCNC: 3.4 G/DL (ref 2–4)
GLUCOSE SERPL-MCNC: 96 MG/DL (ref 70–99)
HCT VFR BLD CALC: 42.7 % (ref 39–51)
HDLC SERPL-MCNC: 68 MG/DL
HGB BLD-MCNC: 13.6 G/DL (ref 13–17)
IMM GRANULOCYTES # BLD AUTO: 0 K/MCL (ref 0–0.2)
IMM GRANULOCYTES # BLD: 0 %
LDLC SERPL CALC-MCNC: 78 MG/DL
LYMPHOCYTES # BLD: 0.5 K/MCL (ref 1–4)
LYMPHOCYTES NFR BLD: 14 %
MCH RBC QN AUTO: 32.9 PG (ref 26–34)
MCHC RBC AUTO-ENTMCNC: 31.9 G/DL (ref 32–36.5)
MCV RBC AUTO: 103.4 FL (ref 78–100)
MICROALBUMIN UR-MCNC: 1.8 MG/DL
MICROALBUMIN/CREAT UR: 21.9 MG/G
MONOCYTES # BLD: 0.4 K/MCL (ref 0.3–0.9)
MONOCYTES NFR BLD: 11 %
NEUTROPHILS # BLD: 2.6 K/MCL (ref 1.8–7.7)
NEUTROPHILS NFR BLD: 71 %
NONHDLC SERPL-MCNC: 91 MG/DL
NRBC BLD MANUAL-RTO: 0 /100 WBC
PLATELET # BLD AUTO: 109 K/MCL (ref 140–450)
POTASSIUM SERPL-SCNC: 4.1 MMOL/L (ref 3.4–5.1)
PROT SERPL-MCNC: 7.1 G/DL (ref 6.4–8.2)
PSA SERPL-MCNC: 2.62 NG/ML
RBC # BLD: 4.13 MIL/MCL (ref 4.5–5.9)
SODIUM SERPL-SCNC: 139 MMOL/L (ref 135–145)
T4 FREE SERPL-MCNC: 0.9 NG/DL (ref 0.8–1.5)
TRIGL SERPL-MCNC: 64 MG/DL
TSH SERPL-ACNC: 10.04 MCUNITS/ML (ref 0.35–5)
WBC # BLD: 3.7 K/MCL (ref 4.2–11)

## 2023-05-04 ENCOUNTER — TELEPHONE (OUTPATIENT)
Dept: INTERNAL MEDICINE | Age: 78
End: 2023-05-04

## 2023-05-04 RX ORDER — LEVOTHYROXINE SODIUM 0.05 MG/1
50 TABLET ORAL DAILY
Qty: 90 TABLET | Refills: 1 | Status: SHIPPED | OUTPATIENT
Start: 2023-05-04

## 2023-05-05 ENCOUNTER — NURSE TRIAGE (OUTPATIENT)
Dept: TELEHEALTH | Age: 78
End: 2023-05-05

## 2023-05-06 ENCOUNTER — TELEPHONE (OUTPATIENT)
Dept: INTERNAL MEDICINE | Age: 78
End: 2023-05-06

## 2023-05-06 ENCOUNTER — TELEPHONE (OUTPATIENT)
Dept: FAMILY MEDICINE | Age: 78
End: 2023-05-06

## 2023-05-06 RX ORDER — AZITHROMYCIN 250 MG/1
TABLET, FILM COATED ORAL
Qty: 6 TABLET | Refills: 0 | Status: SHIPPED | OUTPATIENT
Start: 2023-05-06 | End: 2023-05-11

## 2023-06-12 ENCOUNTER — TELEPHONE (OUTPATIENT)
Dept: INTERNAL MEDICINE | Age: 78
End: 2023-06-12

## 2023-07-28 ENCOUNTER — TELEPHONE (OUTPATIENT)
Dept: INTERNAL MEDICINE | Age: 78
End: 2023-07-28

## 2023-07-28 DIAGNOSIS — N52.9 ERECTILE DYSFUNCTION, UNSPECIFIED ERECTILE DYSFUNCTION TYPE: Primary | ICD-10-CM

## 2023-07-28 RX ORDER — TADALAFIL 10 MG/1
5 TABLET ORAL PRN
Qty: 30 TABLET | Refills: 3 | OUTPATIENT
Start: 2023-07-28 | End: 2023-08-14 | Stop reason: SDUPTHER

## 2023-08-14 RX ORDER — TADALAFIL 5 MG/1
5 TABLET ORAL DAILY PRN
Qty: 30 TABLET | OUTPATIENT
Start: 2023-08-14

## 2023-08-14 RX ORDER — TADALAFIL 10 MG/1
5 TABLET ORAL PRN
Qty: 30 TABLET | Refills: 3 | Status: SHIPPED | OUTPATIENT
Start: 2023-08-14 | End: 2023-11-03 | Stop reason: ALTCHOICE

## 2023-08-30 ENCOUNTER — OFFICE VISIT (OUTPATIENT)
Dept: INTERNAL MEDICINE | Age: 78
End: 2023-08-30

## 2023-08-30 VITALS
RESPIRATION RATE: 18 BRPM | DIASTOLIC BLOOD PRESSURE: 62 MMHG | OXYGEN SATURATION: 98 % | HEART RATE: 66 BPM | SYSTOLIC BLOOD PRESSURE: 104 MMHG | WEIGHT: 132.83 LBS | BODY MASS INDEX: 19.02 KG/M2 | TEMPERATURE: 97.3 F | HEIGHT: 70 IN

## 2023-08-30 DIAGNOSIS — R31.21 ASYMPTOMATIC MICROSCOPIC HEMATURIA: Primary | ICD-10-CM

## 2023-08-30 DIAGNOSIS — N39.0 RECURRENT UTI: ICD-10-CM

## 2023-08-30 PROCEDURE — 3078F DIAST BP <80 MM HG: CPT | Performed by: INTERNAL MEDICINE

## 2023-08-30 PROCEDURE — 3074F SYST BP LT 130 MM HG: CPT | Performed by: INTERNAL MEDICINE

## 2023-08-30 PROCEDURE — 99213 OFFICE O/P EST LOW 20 MIN: CPT | Performed by: INTERNAL MEDICINE

## 2023-08-30 RX ORDER — CIPROFLOXACIN 500 MG/1
500 TABLET, FILM COATED ORAL 2 TIMES DAILY
Qty: 6 TABLET | Refills: 0 | Status: SHIPPED | OUTPATIENT
Start: 2023-08-30 | End: 2023-09-02

## 2023-08-30 ASSESSMENT — PATIENT HEALTH QUESTIONNAIRE - PHQ9
1. LITTLE INTEREST OR PLEASURE IN DOING THINGS: NOT AT ALL
CLINICAL INTERPRETATION OF PHQ2 SCORE: NO FURTHER SCREENING NEEDED
2. FEELING DOWN, DEPRESSED OR HOPELESS: NOT AT ALL
SUM OF ALL RESPONSES TO PHQ9 QUESTIONS 1 AND 2: 0
SUM OF ALL RESPONSES TO PHQ9 QUESTIONS 1 AND 2: 0

## 2023-08-30 ASSESSMENT — PAIN SCALES - GENERAL: PAINLEVEL: 0

## 2023-08-31 ENCOUNTER — APPOINTMENT (OUTPATIENT)
Dept: FAMILY MEDICINE | Age: 78
End: 2023-08-31

## 2023-08-31 ENCOUNTER — TELEPHONE (OUTPATIENT)
Dept: SCHEDULING | Age: 78
End: 2023-08-31

## 2023-09-01 ENCOUNTER — TELEPHONE (OUTPATIENT)
Dept: FAMILY MEDICINE | Age: 78
End: 2023-09-01

## 2023-09-26 ENCOUNTER — ANESTHESIA (OUTPATIENT)
Dept: GASTROENTEROLOGY | Age: 78
End: 2023-09-26

## 2023-09-26 ENCOUNTER — HOSPITAL ENCOUNTER (OUTPATIENT)
Dept: GASTROENTEROLOGY | Age: 78
Discharge: HOME | End: 2023-09-26
Attending: INTERNAL MEDICINE

## 2023-09-26 ENCOUNTER — ANESTHESIA EVENT (OUTPATIENT)
Dept: GASTROENTEROLOGY | Age: 78
End: 2023-09-26

## 2023-09-26 VITALS
TEMPERATURE: 97.9 F | RESPIRATION RATE: 16 BRPM | DIASTOLIC BLOOD PRESSURE: 71 MMHG | SYSTOLIC BLOOD PRESSURE: 109 MMHG | BODY MASS INDEX: 18.68 KG/M2 | HEIGHT: 70 IN | WEIGHT: 130.51 LBS | HEART RATE: 64 BPM | OXYGEN SATURATION: 100 %

## 2023-09-26 DIAGNOSIS — K94.23 GASTROSTOMY MALFUNCTION (CMD): ICD-10-CM

## 2023-09-26 PROCEDURE — 10006027 HB SUPPLY 278

## 2023-09-26 PROCEDURE — 10006023 HB SUPPLY 272

## 2023-09-26 PROCEDURE — 10002800 HB RX 250 W HCPCS: Performed by: ANESTHESIOLOGY

## 2023-09-26 PROCEDURE — 10002807 HB RX 258: Performed by: INTERNAL MEDICINE

## 2023-09-26 PROCEDURE — 13000024 HB GI COMPLEX CASE S/U + 1ST 15 MIN

## 2023-09-26 PROCEDURE — 13000008 HB ANESTHESIA MAC OUTSIDE OR

## 2023-09-26 PROCEDURE — 13000001 HB PHASE II RECOVERY EA 30 MINUTES

## 2023-09-26 PROCEDURE — 10004451 HB PACU RECOVERY 1ST 30 MINUTES

## 2023-09-26 PROCEDURE — C1769 GUIDE WIRE: HCPCS

## 2023-09-26 RX ORDER — SODIUM CHLORIDE 9 MG/ML
INJECTION, SOLUTION INTRAVENOUS CONTINUOUS
Status: DISCONTINUED | OUTPATIENT
Start: 2023-09-26 | End: 2023-09-26

## 2023-09-26 RX ADMIN — PROPOFOL 150 MCG/KG/MIN: 10 INJECTION, EMULSION INTRAVENOUS at 08:13

## 2023-09-26 RX ADMIN — SODIUM CHLORIDE: 9 INJECTION, SOLUTION INTRAVENOUS at 07:34

## 2023-09-26 ASSESSMENT — COPD QUESTIONNAIRES: COPD: 1

## 2023-09-26 ASSESSMENT — PAIN SCALES - WONG BAKER
WONGBAKER_NUMERICALRESPONSE: 0
WONGBAKER_NUMERICALRESPONSE: 0

## 2023-09-26 ASSESSMENT — PAIN SCALES - GENERAL
PAINLEVEL_OUTOF10: 0

## 2023-09-26 ASSESSMENT — ACTIVITIES OF DAILY LIVING (ADL)
CHRONIC_PAIN_PRESENT: NO
HISTORY OF FALLING IN THE LAST YEAR (PRIOR TO ADMISSION): NO
ADL_BEFORE_ADMISSION: INDEPENDENT
RECENT_DECLINE_ADL: NO
ADL_SCORE: 12
NEEDS_ASSIST: NO
ADL_SHORT_OF_BREATH: NO

## 2023-09-26 ASSESSMENT — COGNITIVE AND FUNCTIONAL STATUS - GENERAL
ARE YOU BLIND OR DO YOU HAVE SERIOUS DIFFICULTY SEEING, EVEN WHEN WEARING GLASSES: NO
ARE YOU DEAF OR DO YOU HAVE SERIOUS DIFFICULTY  HEARING: NO

## 2023-09-26 ASSESSMENT — ENCOUNTER SYMPTOMS: EXERCISE TOLERANCE: GOOD (>4 METS)

## 2023-11-01 ENCOUNTER — APPOINTMENT (OUTPATIENT)
Dept: FAMILY MEDICINE | Age: 78
End: 2023-11-01

## 2023-11-03 ENCOUNTER — OFFICE VISIT (OUTPATIENT)
Dept: FAMILY MEDICINE | Age: 78
End: 2023-11-03

## 2023-11-03 VITALS
SYSTOLIC BLOOD PRESSURE: 116 MMHG | RESPIRATION RATE: 16 BRPM | HEIGHT: 70 IN | DIASTOLIC BLOOD PRESSURE: 80 MMHG | OXYGEN SATURATION: 98 % | BODY MASS INDEX: 19.22 KG/M2 | WEIGHT: 134.26 LBS | HEART RATE: 60 BPM

## 2023-11-03 DIAGNOSIS — J06.9 ACUTE UPPER RESPIRATORY INFECTION, UNSPECIFIED: Primary | ICD-10-CM

## 2023-11-03 DIAGNOSIS — C09.9 TONSILLAR CANCER (CMD): ICD-10-CM

## 2023-11-03 PROCEDURE — 99213 OFFICE O/P EST LOW 20 MIN: CPT | Performed by: FAMILY MEDICINE

## 2023-11-03 PROCEDURE — 3079F DIAST BP 80-89 MM HG: CPT | Performed by: FAMILY MEDICINE

## 2023-11-03 PROCEDURE — 3074F SYST BP LT 130 MM HG: CPT | Performed by: FAMILY MEDICINE

## 2023-11-03 RX ORDER — AZITHROMYCIN 250 MG/1
TABLET, FILM COATED ORAL
Qty: 6 TABLET | Refills: 0 | Status: SHIPPED | OUTPATIENT
Start: 2023-11-03

## 2023-11-03 RX ORDER — BENZONATATE 100 MG/1
100 CAPSULE ORAL 3 TIMES DAILY PRN
Qty: 15 CAPSULE | Refills: 0 | Status: SHIPPED | OUTPATIENT
Start: 2023-11-03 | End: 2023-11-08

## 2023-11-03 ASSESSMENT — ENCOUNTER SYMPTOMS
GASTROINTESTINAL NEGATIVE: 1
NEUROLOGICAL NEGATIVE: 1
PSYCHIATRIC NEGATIVE: 1
RESPIRATORY NEGATIVE: 1
CONSTITUTIONAL NEGATIVE: 1

## 2023-11-26 ENCOUNTER — EXTERNAL RECORD (OUTPATIENT)
Dept: HEALTH INFORMATION MANAGEMENT | Facility: OTHER | Age: 78
End: 2023-11-26

## 2023-12-07 ENCOUNTER — OFFICE VISIT (OUTPATIENT)
Dept: INTERNAL MEDICINE | Age: 78
End: 2023-12-07

## 2023-12-07 ENCOUNTER — LAB SERVICES (OUTPATIENT)
Dept: LAB | Age: 78
End: 2023-12-07

## 2023-12-07 VITALS
RESPIRATION RATE: 16 BRPM | OXYGEN SATURATION: 99 % | TEMPERATURE: 98.2 F | HEART RATE: 100 BPM | WEIGHT: 132.5 LBS | DIASTOLIC BLOOD PRESSURE: 80 MMHG | BODY MASS INDEX: 18.97 KG/M2 | SYSTOLIC BLOOD PRESSURE: 130 MMHG | HEIGHT: 70 IN

## 2023-12-07 DIAGNOSIS — E78.2 MIXED HYPERLIPIDEMIA: ICD-10-CM

## 2023-12-07 DIAGNOSIS — E03.9 ACQUIRED HYPOTHYROIDISM: ICD-10-CM

## 2023-12-07 DIAGNOSIS — I10 ESSENTIAL HYPERTENSION: Primary | ICD-10-CM

## 2023-12-07 DIAGNOSIS — I42.0 DILATED CARDIOMYOPATHY (CMD): ICD-10-CM

## 2023-12-07 DIAGNOSIS — I10 ESSENTIAL HYPERTENSION: ICD-10-CM

## 2023-12-07 DIAGNOSIS — C09.9 TONSILLAR CANCER (CMD): ICD-10-CM

## 2023-12-07 PROCEDURE — 84481 FREE ASSAY (FT-3): CPT | Performed by: CLINICAL MEDICAL LABORATORY

## 2023-12-07 PROCEDURE — 3075F SYST BP GE 130 - 139MM HG: CPT | Performed by: INTERNAL MEDICINE

## 2023-12-07 PROCEDURE — 36415 COLL VENOUS BLD VENIPUNCTURE: CPT | Performed by: INTERNAL MEDICINE

## 2023-12-07 PROCEDURE — 84439 ASSAY OF FREE THYROXINE: CPT | Performed by: CLINICAL MEDICAL LABORATORY

## 2023-12-07 PROCEDURE — 80053 COMPREHEN METABOLIC PANEL: CPT | Performed by: CLINICAL MEDICAL LABORATORY

## 2023-12-07 PROCEDURE — 3079F DIAST BP 80-89 MM HG: CPT | Performed by: INTERNAL MEDICINE

## 2023-12-07 PROCEDURE — 99214 OFFICE O/P EST MOD 30 MIN: CPT | Performed by: INTERNAL MEDICINE

## 2023-12-07 PROCEDURE — 84443 ASSAY THYROID STIM HORMONE: CPT | Performed by: CLINICAL MEDICAL LABORATORY

## 2023-12-07 RX ORDER — ALPRAZOLAM 0.5 MG/1
0.5 TABLET ORAL DAILY
Qty: 90 TABLET | Refills: 0 | Status: SHIPPED | OUTPATIENT
Start: 2023-12-07

## 2023-12-07 RX ORDER — LEVOTHYROXINE SODIUM 0.05 MG/1
50 TABLET ORAL DAILY
Qty: 90 TABLET | Refills: 1 | Status: SHIPPED | OUTPATIENT
Start: 2023-12-07

## 2023-12-07 ASSESSMENT — ENCOUNTER SYMPTOMS
ALLERGIC/IMMUNOLOGIC NEGATIVE: 1
ENDOCRINE NEGATIVE: 1
SHORTNESS OF BREATH: 1
EYES NEGATIVE: 1
FATIGUE: 1
NEUROLOGICAL NEGATIVE: 1
CHEST TIGHTNESS: 1
BRUISES/BLEEDS EASILY: 1
GASTROINTESTINAL NEGATIVE: 1
PSYCHIATRIC NEGATIVE: 1

## 2023-12-08 ENCOUNTER — HOSPITAL ENCOUNTER (OUTPATIENT)
Dept: CV DIAGNOSTICS | Facility: HOSPITAL | Age: 78
Discharge: HOME | End: 2023-12-08
Attending: INTERNAL MEDICINE
Payer: MEDICARE

## 2023-12-08 DIAGNOSIS — Z95.3 HISTORY OF AORTIC VALVE REPLACEMENT WITH BIOPROSTHETIC VALVE: ICD-10-CM

## 2023-12-08 DIAGNOSIS — E78.00 PURE HYPERCHOLESTEROLEMIA: ICD-10-CM

## 2023-12-08 DIAGNOSIS — I42.0 DILATED CARDIOMYOPATHY (HCC): ICD-10-CM

## 2023-12-08 LAB
ALBUMIN SERPL-MCNC: 3.6 G/DL (ref 3.6–5.1)
ALBUMIN/GLOB SERPL: 1.1 {RATIO} (ref 1–2.4)
ALP SERPL-CCNC: 106 UNITS/L (ref 45–117)
ALT SERPL-CCNC: 25 UNITS/L
ANION GAP SERPL CALC-SCNC: 7 MMOL/L (ref 7–19)
AST SERPL-CCNC: 28 UNITS/L
BILIRUB SERPL-MCNC: 1.4 MG/DL (ref 0.2–1)
BUN SERPL-MCNC: 23 MG/DL (ref 6–20)
BUN/CREAT SERPL: 24 (ref 7–25)
CALCIUM SERPL-MCNC: 9.2 MG/DL (ref 8.4–10.2)
CHLORIDE SERPL-SCNC: 101 MMOL/L (ref 97–110)
CO2 SERPL-SCNC: 32 MMOL/L (ref 21–32)
CREAT SERPL-MCNC: 0.96 MG/DL (ref 0.67–1.17)
EGFRCR SERPLBLD CKD-EPI 2021: 80 ML/MIN/{1.73_M2}
FASTING DURATION TIME PATIENT: 8 HOURS (ref 0–999)
GLOBULIN SER-MCNC: 3.4 G/DL (ref 2–4)
GLUCOSE SERPL-MCNC: 97 MG/DL (ref 70–99)
POTASSIUM SERPL-SCNC: 3.9 MMOL/L (ref 3.4–5.1)
PROT SERPL-MCNC: 7 G/DL (ref 6.4–8.2)
SODIUM SERPL-SCNC: 136 MMOL/L (ref 135–145)
T3FREE SERPL-MCNC: 2.3 PG/ML (ref 2.2–4)
T4 FREE SERPL-MCNC: 1 NG/DL (ref 0.8–1.5)
TSH SERPL-ACNC: 5.25 MCUNITS/ML (ref 0.35–5)

## 2023-12-08 PROCEDURE — 93306 TTE W/DOPPLER COMPLETE: CPT | Performed by: INTERNAL MEDICINE

## 2024-01-01 ENCOUNTER — APPOINTMENT (OUTPATIENT)
Dept: GENERAL RADIOLOGY | Facility: HOSPITAL | Age: 79
End: 2024-01-01
Attending: INTERNAL MEDICINE
Payer: MEDICARE

## 2024-01-01 ENCOUNTER — APPOINTMENT (OUTPATIENT)
Dept: GENERAL RADIOLOGY | Facility: HOSPITAL | Age: 79
DRG: 871 | End: 2024-01-01
Attending: STUDENT IN AN ORGANIZED HEALTH CARE EDUCATION/TRAINING PROGRAM
Payer: MEDICARE

## 2024-01-01 ENCOUNTER — HOSPITAL ENCOUNTER (INPATIENT)
Facility: HOSPITAL | Age: 79
LOS: 3 days | DRG: 871 | End: 2024-01-01
Attending: EMERGENCY MEDICINE | Admitting: INTERNAL MEDICINE
Payer: MEDICARE

## 2024-01-01 ENCOUNTER — APPOINTMENT (OUTPATIENT)
Dept: GENERAL RADIOLOGY | Facility: HOSPITAL | Age: 79
End: 2024-01-01
Attending: EMERGENCY MEDICINE
Payer: MEDICARE

## 2024-01-01 ENCOUNTER — APPOINTMENT (OUTPATIENT)
Dept: CV DIAGNOSTICS | Facility: HOSPITAL | Age: 79
End: 2024-01-01
Attending: INTERNAL MEDICINE
Payer: MEDICARE

## 2024-01-01 ENCOUNTER — APPOINTMENT (OUTPATIENT)
Dept: CV DIAGNOSTICS | Facility: HOSPITAL | Age: 79
DRG: 871 | End: 2024-01-01
Attending: INTERNAL MEDICINE
Payer: MEDICARE

## 2024-01-01 ENCOUNTER — HOSPITAL ENCOUNTER (INPATIENT)
Facility: HOSPITAL | Age: 79
LOS: 3 days | End: 2024-01-01
Attending: EMERGENCY MEDICINE | Admitting: INTERNAL MEDICINE
Payer: MEDICARE

## 2024-01-01 ENCOUNTER — APPOINTMENT (OUTPATIENT)
Dept: GENERAL RADIOLOGY | Facility: HOSPITAL | Age: 79
DRG: 871 | End: 2024-01-01
Attending: INTERNAL MEDICINE
Payer: MEDICARE

## 2024-01-01 ENCOUNTER — HOSPITAL ENCOUNTER (INPATIENT)
Facility: HOSPITAL | Age: 79
LOS: 4 days | Discharge: HOME HEALTH | DRG: 871 | End: 2024-01-01
Attending: STUDENT IN AN ORGANIZED HEALTH CARE EDUCATION/TRAINING PROGRAM | Admitting: HOSPITALIST
Payer: MEDICARE

## 2024-01-01 ENCOUNTER — HOSPITAL ENCOUNTER (OUTPATIENT)
Dept: LAB | Facility: HOSPITAL | Age: 79
Discharge: HOME | End: 2024-01-01
Payer: MEDICARE

## 2024-01-01 ENCOUNTER — APPOINTMENT (OUTPATIENT)
Dept: GENERAL RADIOLOGY | Facility: HOSPITAL | Age: 79
DRG: 871 | End: 2024-01-01
Attending: EMERGENCY MEDICINE
Payer: MEDICARE

## 2024-01-01 VITALS
HEART RATE: 87 BPM | OXYGEN SATURATION: 95 % | SYSTOLIC BLOOD PRESSURE: 118 MMHG | HEIGHT: 70 IN | TEMPERATURE: 98 F | BODY MASS INDEX: 19.19 KG/M2 | DIASTOLIC BLOOD PRESSURE: 81 MMHG | RESPIRATION RATE: 18 BRPM | WEIGHT: 134.06 LBS

## 2024-01-01 VITALS
BODY MASS INDEX: 19.85 KG/M2 | OXYGEN SATURATION: 88 % | SYSTOLIC BLOOD PRESSURE: 101 MMHG | WEIGHT: 134 LBS | RESPIRATION RATE: 35 BRPM | HEART RATE: 108 BPM | HEIGHT: 69 IN | DIASTOLIC BLOOD PRESSURE: 64 MMHG | TEMPERATURE: 98 F

## 2024-01-01 DIAGNOSIS — J18.9 SEPSIS DUE TO PNEUMONIA (HCC): ICD-10-CM

## 2024-01-01 DIAGNOSIS — Z98.890 STATUS POST CAROTID ENDARTERECTOMY: ICD-10-CM

## 2024-01-01 DIAGNOSIS — I50.9 ACUTE ON CHRONIC CONGESTIVE HEART FAILURE, UNSPECIFIED HEART FAILURE TYPE (HCC): ICD-10-CM

## 2024-01-01 DIAGNOSIS — E87.1 HYPONATREMIA: ICD-10-CM

## 2024-01-01 DIAGNOSIS — J18.9 COMMUNITY ACQUIRED PNEUMONIA, UNSPECIFIED LATERALITY: ICD-10-CM

## 2024-01-01 DIAGNOSIS — E43 SEVERE PROTEIN-CALORIE MALNUTRITION (HCC): ICD-10-CM

## 2024-01-01 DIAGNOSIS — D62 ANEMIA DUE TO ACUTE BLOOD LOSS: ICD-10-CM

## 2024-01-01 DIAGNOSIS — D50.0 IRON DEFICIENCY ANEMIA DUE TO CHRONIC BLOOD LOSS: ICD-10-CM

## 2024-01-01 DIAGNOSIS — K92.1 MELENA: ICD-10-CM

## 2024-01-01 DIAGNOSIS — J18.9 COMMUNITY ACQUIRED PNEUMONIA, UNSPECIFIED LATERALITY: Primary | ICD-10-CM

## 2024-01-01 DIAGNOSIS — A41.9 SEPSIS DUE TO PNEUMONIA (HCC): ICD-10-CM

## 2024-01-01 DIAGNOSIS — I48.91 ATRIAL FIBRILLATION WITH RAPID VENTRICULAR RESPONSE (HCC): Primary | ICD-10-CM

## 2024-01-01 LAB
ADENOVIRUS PCR:: NOT DETECTED
ALBUMIN SERPL-MCNC: 2.9 G/DL (ref 3.2–4.8)
ALBUMIN SERPL-MCNC: 3.1 G/DL (ref 3.2–4.8)
ALBUMIN SERPL-MCNC: 3.7 G/DL (ref 3.2–4.8)
ALBUMIN SERPL-MCNC: 3.7 G/DL (ref 3.2–4.8)
ALBUMIN/GLOB SERPL: 0.8 {RATIO} (ref 1–2)
ALBUMIN/GLOB SERPL: 1 {RATIO} (ref 1–2)
ALBUMIN/GLOB SERPL: 1 {RATIO} (ref 1–2)
ALBUMIN/GLOB SERPL: 1.3 {RATIO} (ref 1–2)
ALP LIVER SERPL-CCNC: 103 U/L
ALP LIVER SERPL-CCNC: 113 U/L
ALP LIVER SERPL-CCNC: 127 U/L
ALP LIVER SERPL-CCNC: 132 U/L
ALT SERPL-CCNC: 11 U/L
ALT SERPL-CCNC: 19 U/L
ALT SERPL-CCNC: 8 U/L
ALT SERPL-CCNC: <7 U/L
ANION GAP SERPL CALC-SCNC: 0 MMOL/L (ref 0–18)
ANION GAP SERPL CALC-SCNC: 2 MMOL/L (ref 0–18)
ANION GAP SERPL CALC-SCNC: 3 MMOL/L (ref 0–18)
ANION GAP SERPL CALC-SCNC: 5 MMOL/L (ref 0–18)
ANION GAP SERPL CALC-SCNC: 6 MMOL/L (ref 0–18)
ANION GAP SERPL CALC-SCNC: 8 MMOL/L (ref 0–18)
ANTIBODY SCREEN: NEGATIVE
ANTIBODY SCREEN: NEGATIVE
APTT PPP: 25.5 SECONDS (ref 23–36)
APTT PPP: 30.1 SECONDS (ref 23–36)
ARTERIAL PATENCY WRIST A: POSITIVE
AST SERPL-CCNC: 20 U/L (ref ?–34)
AST SERPL-CCNC: 23 U/L (ref ?–34)
AST SERPL-CCNC: 27 U/L (ref ?–34)
AST SERPL-CCNC: 38 U/L (ref ?–34)
ATRIAL RATE: 133 BPM
B PARAPERT DNA SPEC QL NAA+PROBE: NOT DETECTED
B PERT DNA SPEC QL NAA+PROBE: NOT DETECTED
BASE EXCESS BLDA CALC-SCNC: 5 MMOL/L (ref ?–2)
BASOPHILS # BLD AUTO: 0.01 X10(3) UL (ref 0–0.2)
BASOPHILS # BLD AUTO: 0.02 X10(3) UL (ref 0–0.2)
BASOPHILS # BLD AUTO: 0.02 X10(3) UL (ref 0–0.2)
BASOPHILS # BLD AUTO: 0.03 X10(3) UL (ref 0–0.2)
BASOPHILS # BLD AUTO: 0.04 X10(3) UL (ref 0–0.2)
BASOPHILS # BLD AUTO: 0.06 X10(3) UL (ref 0–0.2)
BASOPHILS # BLD AUTO: 0.06 X10(3) UL (ref 0–0.2)
BASOPHILS NFR BLD AUTO: 0.1 %
BASOPHILS NFR BLD AUTO: 0.1 %
BASOPHILS NFR BLD AUTO: 0.2 %
BASOPHILS NFR BLD AUTO: 0.4 %
BASOPHILS NFR BLD AUTO: 0.7 %
BASOPHILS NFR BLD AUTO: 0.7 %
BASOPHILS NFR BLD AUTO: 1 %
BILIRUB SERPL-MCNC: 0.5 MG/DL (ref 0.2–1.1)
BILIRUB SERPL-MCNC: 0.6 MG/DL (ref 0.2–1.1)
BILIRUB SERPL-MCNC: 0.9 MG/DL (ref 0.2–1.1)
BILIRUB SERPL-MCNC: 1.1 MG/DL (ref 0.2–1.1)
BILIRUB UR QL STRIP.AUTO: NEGATIVE
BILIRUB UR QL STRIP.AUTO: NEGATIVE
BLOOD TYPE BARCODE: 5100
BLOOD TYPE BARCODE: 5100
BODY TEMPERATURE: 98.6 F
BUN BLD-MCNC: 18 MG/DL (ref 9–23)
BUN BLD-MCNC: 24 MG/DL (ref 9–23)
BUN BLD-MCNC: 25 MG/DL (ref 9–23)
BUN BLD-MCNC: 26 MG/DL (ref 9–23)
BUN BLD-MCNC: 29 MG/DL (ref 9–23)
BUN BLD-MCNC: 37 MG/DL (ref 9–23)
BUN BLD-MCNC: 58 MG/DL (ref 9–23)
C PNEUM DNA SPEC QL NAA+PROBE: NOT DETECTED
CALCIUM BLD-MCNC: 8.6 MG/DL (ref 8.7–10.4)
CALCIUM BLD-MCNC: 8.6 MG/DL (ref 8.7–10.4)
CALCIUM BLD-MCNC: 8.8 MG/DL (ref 8.7–10.4)
CALCIUM BLD-MCNC: 8.9 MG/DL (ref 8.7–10.4)
CALCIUM BLD-MCNC: 9.2 MG/DL (ref 8.7–10.4)
CALCIUM BLD-MCNC: 9.2 MG/DL (ref 8.7–10.4)
CALCIUM BLD-MCNC: 9.3 MG/DL (ref 8.7–10.4)
CHLORIDE SERPL-SCNC: 103 MMOL/L (ref 98–112)
CHLORIDE SERPL-SCNC: 104 MMOL/L (ref 98–112)
CHLORIDE SERPL-SCNC: 104 MMOL/L (ref 98–112)
CHLORIDE SERPL-SCNC: 105 MMOL/L (ref 98–112)
CHLORIDE SERPL-SCNC: 105 MMOL/L (ref 98–112)
CHLORIDE SERPL-SCNC: 106 MMOL/L (ref 98–112)
CHLORIDE SERPL-SCNC: 106 MMOL/L (ref 98–112)
CHLORIDE SERPL-SCNC: 107 MMOL/L (ref 98–112)
CHLORIDE SERPL-SCNC: 94 MMOL/L (ref 98–112)
CLARITY UR REFRACT.AUTO: CLEAR
CLARITY UR REFRACT.AUTO: CLEAR
CO2 SERPL-SCNC: 28 MMOL/L (ref 21–32)
CO2 SERPL-SCNC: 29 MMOL/L (ref 21–32)
CO2 SERPL-SCNC: 29 MMOL/L (ref 21–32)
CO2 SERPL-SCNC: 30 MMOL/L (ref 21–32)
CO2 SERPL-SCNC: 32 MMOL/L (ref 21–32)
CO2 SERPL-SCNC: 32 MMOL/L (ref 21–32)
CO2 SERPL-SCNC: 35 MMOL/L (ref 21–32)
COHGB MFR BLD: 3.7 % SAT (ref 0–3)
COLOR UR AUTO: YELLOW
CORONAVIRUS 229E PCR:: NOT DETECTED
CORONAVIRUS HKU1 PCR:: NOT DETECTED
CORONAVIRUS NL63 PCR:: NOT DETECTED
CORONAVIRUS OC43 PCR:: NOT DETECTED
CREAT BLD-MCNC: 0.88 MG/DL
CREAT BLD-MCNC: 0.9 MG/DL
CREAT BLD-MCNC: 0.91 MG/DL
CREAT BLD-MCNC: 0.92 MG/DL
CREAT BLD-MCNC: 0.94 MG/DL
CREAT BLD-MCNC: 0.96 MG/DL
CREAT BLD-MCNC: 1.01 MG/DL
CREAT BLD-MCNC: 1.05 MG/DL
CREAT BLD-MCNC: 1.27 MG/DL
EGFRCR SERPLBLD CKD-EPI 2021: 57 ML/MIN/1.73M2 (ref 60–?)
EGFRCR SERPLBLD CKD-EPI 2021: 72 ML/MIN/1.73M2 (ref 60–?)
EGFRCR SERPLBLD CKD-EPI 2021: 75 ML/MIN/1.73M2 (ref 60–?)
EGFRCR SERPLBLD CKD-EPI 2021: 80 ML/MIN/1.73M2 (ref 60–?)
EGFRCR SERPLBLD CKD-EPI 2021: 82 ML/MIN/1.73M2 (ref 60–?)
EGFRCR SERPLBLD CKD-EPI 2021: 84 ML/MIN/1.73M2 (ref 60–?)
EGFRCR SERPLBLD CKD-EPI 2021: 85 ML/MIN/1.73M2 (ref 60–?)
EGFRCR SERPLBLD CKD-EPI 2021: 86 ML/MIN/1.73M2 (ref 60–?)
EGFRCR SERPLBLD CKD-EPI 2021: 87 ML/MIN/1.73M2 (ref 60–?)
EOSINOPHIL # BLD AUTO: 0 X10(3) UL (ref 0–0.7)
EOSINOPHIL # BLD AUTO: 0.01 X10(3) UL (ref 0–0.7)
EOSINOPHIL # BLD AUTO: 0.06 X10(3) UL (ref 0–0.7)
EOSINOPHIL # BLD AUTO: 0.11 X10(3) UL (ref 0–0.7)
EOSINOPHIL # BLD AUTO: 0.14 X10(3) UL (ref 0–0.7)
EOSINOPHIL # BLD AUTO: 0.17 X10(3) UL (ref 0–0.7)
EOSINOPHIL # BLD AUTO: 0.18 X10(3) UL (ref 0–0.7)
EOSINOPHIL # BLD AUTO: 0.22 X10(3) UL (ref 0–0.7)
EOSINOPHIL # BLD AUTO: 0.25 X10(3) UL (ref 0–0.7)
EOSINOPHIL NFR BLD AUTO: 0 %
EOSINOPHIL NFR BLD AUTO: 0.2 %
EOSINOPHIL NFR BLD AUTO: 0.8 %
EOSINOPHIL NFR BLD AUTO: 1.2 %
EOSINOPHIL NFR BLD AUTO: 2.3 %
EOSINOPHIL NFR BLD AUTO: 2.3 %
EOSINOPHIL NFR BLD AUTO: 2.4 %
EOSINOPHIL NFR BLD AUTO: 2.5 %
EOSINOPHIL NFR BLD AUTO: 4.1 %
ERYTHROCYTE [DISTWIDTH] IN BLOOD BY AUTOMATED COUNT: 13.6 %
ERYTHROCYTE [DISTWIDTH] IN BLOOD BY AUTOMATED COUNT: 14.8 %
ERYTHROCYTE [DISTWIDTH] IN BLOOD BY AUTOMATED COUNT: 15.1 %
ERYTHROCYTE [DISTWIDTH] IN BLOOD BY AUTOMATED COUNT: 15.3 %
ERYTHROCYTE [DISTWIDTH] IN BLOOD BY AUTOMATED COUNT: 15.4 %
ERYTHROCYTE [DISTWIDTH] IN BLOOD BY AUTOMATED COUNT: 15.6 %
ERYTHROCYTE [DISTWIDTH] IN BLOOD BY AUTOMATED COUNT: 15.8 %
ERYTHROCYTE [DISTWIDTH] IN BLOOD BY AUTOMATED COUNT: 15.9 %
ERYTHROCYTE [DISTWIDTH] IN BLOOD BY AUTOMATED COUNT: 16.1 %
FASTING STATUS PATIENT QL REPORTED: NO
FLUAV + FLUBV RNA SPEC NAA+PROBE: NEGATIVE
FLUAV RNA SPEC QL NAA+PROBE: NOT DETECTED
FLUBV RNA SPEC QL NAA+PROBE: NOT DETECTED
GLOBULIN PLAS-MCNC: 2.9 G/DL (ref 2–3.5)
GLOBULIN PLAS-MCNC: 3.1 G/DL (ref 2–3.5)
GLOBULIN PLAS-MCNC: 3.5 G/DL (ref 2–3.5)
GLOBULIN PLAS-MCNC: 3.6 G/DL (ref 2–3.5)
GLUCOSE BLD-MCNC: 100 MG/DL (ref 70–99)
GLUCOSE BLD-MCNC: 101 MG/DL (ref 70–99)
GLUCOSE BLD-MCNC: 106 MG/DL (ref 70–99)
GLUCOSE BLD-MCNC: 106 MG/DL (ref 70–99)
GLUCOSE BLD-MCNC: 108 MG/DL (ref 70–99)
GLUCOSE BLD-MCNC: 109 MG/DL (ref 70–99)
GLUCOSE BLD-MCNC: 113 MG/DL (ref 70–99)
GLUCOSE BLD-MCNC: 114 MG/DL (ref 70–99)
GLUCOSE BLD-MCNC: 117 MG/DL (ref 70–99)
GLUCOSE BLD-MCNC: 122 MG/DL (ref 70–99)
GLUCOSE BLD-MCNC: 125 MG/DL (ref 70–99)
GLUCOSE BLD-MCNC: 125 MG/DL (ref 70–99)
GLUCOSE BLD-MCNC: 126 MG/DL (ref 70–99)
GLUCOSE BLD-MCNC: 126 MG/DL (ref 70–99)
GLUCOSE BLD-MCNC: 127 MG/DL (ref 70–99)
GLUCOSE BLD-MCNC: 132 MG/DL (ref 70–99)
GLUCOSE BLD-MCNC: 149 MG/DL (ref 70–99)
GLUCOSE BLD-MCNC: 150 MG/DL (ref 70–99)
GLUCOSE BLD-MCNC: 160 MG/DL (ref 70–99)
GLUCOSE BLD-MCNC: 169 MG/DL (ref 70–99)
GLUCOSE BLD-MCNC: 175 MG/DL (ref 70–99)
GLUCOSE BLD-MCNC: 188 MG/DL (ref 70–99)
GLUCOSE BLD-MCNC: 232 MG/DL (ref 70–99)
GLUCOSE UR STRIP.AUTO-MCNC: NORMAL MG/DL
GLUCOSE UR STRIP.AUTO-MCNC: NORMAL MG/DL
HCO3 BLDA-SCNC: 28.8 MEQ/L (ref 21–27)
HCT VFR BLD AUTO: 19.4 %
HCT VFR BLD AUTO: 23.7 %
HCT VFR BLD AUTO: 24.1 %
HCT VFR BLD AUTO: 24.2 %
HCT VFR BLD AUTO: 24.6 %
HCT VFR BLD AUTO: 24.6 %
HCT VFR BLD AUTO: 25 %
HCT VFR BLD AUTO: 25.2 %
HCT VFR BLD AUTO: 25.2 %
HCT VFR BLD AUTO: 25.6 %
HCT VFR BLD AUTO: 26.5 %
HCT VFR BLD AUTO: 27.3 %
HCT VFR BLD AUTO: 28.2 %
HGB BLD-MCNC: 6.2 G/DL
HGB BLD-MCNC: 6.4 G/DL
HGB BLD-MCNC: 7.2 G/DL
HGB BLD-MCNC: 7.6 G/DL
HGB BLD-MCNC: 7.7 G/DL
HGB BLD-MCNC: 7.8 G/DL
HGB BLD-MCNC: 7.9 G/DL
HGB BLD-MCNC: 7.9 G/DL
HGB BLD-MCNC: 8 G/DL
HGB BLD-MCNC: 8 G/DL
HGB BLD-MCNC: 8.1 G/DL
HGB BLD-MCNC: 8.2 G/DL
HGB BLD-MCNC: 8.8 G/DL
IMM GRANULOCYTES # BLD AUTO: 0.05 X10(3) UL (ref 0–1)
IMM GRANULOCYTES # BLD AUTO: 0.06 X10(3) UL (ref 0–1)
IMM GRANULOCYTES # BLD AUTO: 0.07 X10(3) UL (ref 0–1)
IMM GRANULOCYTES # BLD AUTO: 0.09 X10(3) UL (ref 0–1)
IMM GRANULOCYTES # BLD AUTO: 0.09 X10(3) UL (ref 0–1)
IMM GRANULOCYTES # BLD AUTO: 0.11 X10(3) UL (ref 0–1)
IMM GRANULOCYTES NFR BLD: 0.7 %
IMM GRANULOCYTES NFR BLD: 0.7 %
IMM GRANULOCYTES NFR BLD: 0.8 %
IMM GRANULOCYTES NFR BLD: 0.8 %
IMM GRANULOCYTES NFR BLD: 1 %
IMM GRANULOCYTES NFR BLD: 1 %
IMM GRANULOCYTES NFR BLD: 1.1 %
IMM GRANULOCYTES NFR BLD: 1.2 %
IMM GRANULOCYTES NFR BLD: 1.5 %
INR BLD: 1.33 (ref 0.8–1.2)
INR BLD: 1.41 (ref 0.8–1.2)
INR BLD: 1.56 (ref 0.8–1.2)
KETONES UR STRIP.AUTO-MCNC: NEGATIVE MG/DL
KETONES UR STRIP.AUTO-MCNC: NEGATIVE MG/DL
L PNEUMO AG UR QL: NEGATIVE
L/M: 4 L/MIN
LACTATE SERPL-SCNC: 0.9 MMOL/L (ref 0.5–2)
LACTATE SERPL-SCNC: 1.4 MMOL/L (ref 0.5–2)
LACTATE SERPL-SCNC: 2.1 MMOL/L (ref 0.5–2)
LACTATE SERPL-SCNC: 2.9 MMOL/L (ref 0.5–2)
LEUKOCYTE ESTERASE UR QL STRIP.AUTO: NEGATIVE
LEUKOCYTE ESTERASE UR QL STRIP.AUTO: NEGATIVE
LYMPHOCYTES # BLD AUTO: 0.15 X10(3) UL (ref 1–4)
LYMPHOCYTES # BLD AUTO: 0.27 X10(3) UL (ref 1–4)
LYMPHOCYTES # BLD AUTO: 0.28 X10(3) UL (ref 1–4)
LYMPHOCYTES # BLD AUTO: 0.29 X10(3) UL (ref 1–4)
LYMPHOCYTES # BLD AUTO: 0.32 X10(3) UL (ref 1–4)
LYMPHOCYTES # BLD AUTO: 0.34 X10(3) UL (ref 1–4)
LYMPHOCYTES # BLD AUTO: 0.37 X10(3) UL (ref 1–4)
LYMPHOCYTES # BLD AUTO: 0.38 X10(3) UL (ref 1–4)
LYMPHOCYTES # BLD AUTO: 0.39 X10(3) UL (ref 1–4)
LYMPHOCYTES NFR BLD AUTO: 1.5 %
LYMPHOCYTES NFR BLD AUTO: 3.6 %
LYMPHOCYTES NFR BLD AUTO: 3.8 %
LYMPHOCYTES NFR BLD AUTO: 4 %
LYMPHOCYTES NFR BLD AUTO: 4.1 %
LYMPHOCYTES NFR BLD AUTO: 4.6 %
LYMPHOCYTES NFR BLD AUTO: 5.1 %
LYMPHOCYTES NFR BLD AUTO: 5.2 %
LYMPHOCYTES NFR BLD AUTO: 6.4 %
MAGNESIUM SERPL-MCNC: 2.2 MG/DL (ref 1.6–2.6)
MCH RBC QN AUTO: 28.8 PG (ref 26–34)
MCH RBC QN AUTO: 28.8 PG (ref 26–34)
MCH RBC QN AUTO: 29.1 PG (ref 26–34)
MCH RBC QN AUTO: 29.3 PG (ref 26–34)
MCH RBC QN AUTO: 30.1 PG (ref 26–34)
MCH RBC QN AUTO: 30.3 PG (ref 26–34)
MCH RBC QN AUTO: 30.7 PG (ref 26–34)
MCH RBC QN AUTO: 31.5 PG (ref 26–34)
MCH RBC QN AUTO: 32.7 PG (ref 26–34)
MCHC RBC AUTO-ENTMCNC: 28.9 G/DL (ref 31–37)
MCHC RBC AUTO-ENTMCNC: 30.2 G/DL (ref 31–37)
MCHC RBC AUTO-ENTMCNC: 30.4 G/DL (ref 31–37)
MCHC RBC AUTO-ENTMCNC: 30.6 G/DL (ref 31–37)
MCHC RBC AUTO-ENTMCNC: 31.2 G/DL (ref 31–37)
MCHC RBC AUTO-ENTMCNC: 31.7 G/DL (ref 31–37)
MCHC RBC AUTO-ENTMCNC: 33 G/DL (ref 31–37)
MCHC RBC AUTO-ENTMCNC: 33.1 G/DL (ref 31–37)
MCHC RBC AUTO-ENTMCNC: 33.3 G/DL (ref 31–37)
MCV RBC AUTO: 101.1 FL
MCV RBC AUTO: 92.7 FL
MCV RBC AUTO: 93.4 FL
MCV RBC AUTO: 94.6 FL
MCV RBC AUTO: 94.8 FL
MCV RBC AUTO: 95 FL
MCV RBC AUTO: 95.5 FL
MCV RBC AUTO: 99 FL
MCV RBC AUTO: 99.3 FL
METAPNEUMOVIRUS PCR:: NOT DETECTED
METHGB MFR BLD: 0.9 % SAT (ref 0.4–1.5)
MONOCYTES # BLD AUTO: 0.51 X10(3) UL (ref 0.1–1)
MONOCYTES # BLD AUTO: 0.57 X10(3) UL (ref 0.1–1)
MONOCYTES # BLD AUTO: 0.61 X10(3) UL (ref 0.1–1)
MONOCYTES # BLD AUTO: 0.63 X10(3) UL (ref 0.1–1)
MONOCYTES # BLD AUTO: 0.63 X10(3) UL (ref 0.1–1)
MONOCYTES # BLD AUTO: 0.66 X10(3) UL (ref 0.1–1)
MONOCYTES # BLD AUTO: 0.7 X10(3) UL (ref 0.1–1)
MONOCYTES # BLD AUTO: 0.7 X10(3) UL (ref 0.1–1)
MONOCYTES # BLD AUTO: 0.82 X10(3) UL (ref 0.1–1)
MONOCYTES NFR BLD AUTO: 10 %
MONOCYTES NFR BLD AUTO: 10.4 %
MONOCYTES NFR BLD AUTO: 10.7 %
MONOCYTES NFR BLD AUTO: 6.2 %
MONOCYTES NFR BLD AUTO: 7.1 %
MONOCYTES NFR BLD AUTO: 7.8 %
MONOCYTES NFR BLD AUTO: 7.9 %
MONOCYTES NFR BLD AUTO: 8.9 %
MONOCYTES NFR BLD AUTO: 9.3 %
MRSA DNA SPEC QL NAA+PROBE: NEGATIVE
MYCOPLASMA PNEUMONIA PCR:: NOT DETECTED
NEUTROPHILS # BLD AUTO: 4.79 X10 (3) UL (ref 1.5–7.7)
NEUTROPHILS # BLD AUTO: 4.79 X10(3) UL (ref 1.5–7.7)
NEUTROPHILS # BLD AUTO: 4.9 X10 (3) UL (ref 1.5–7.7)
NEUTROPHILS # BLD AUTO: 4.9 X10(3) UL (ref 1.5–7.7)
NEUTROPHILS # BLD AUTO: 5.06 X10 (3) UL (ref 1.5–7.7)
NEUTROPHILS # BLD AUTO: 5.06 X10(3) UL (ref 1.5–7.7)
NEUTROPHILS # BLD AUTO: 6.07 X10 (3) UL (ref 1.5–7.7)
NEUTROPHILS # BLD AUTO: 6.07 X10(3) UL (ref 1.5–7.7)
NEUTROPHILS # BLD AUTO: 6.31 X10 (3) UL (ref 1.5–7.7)
NEUTROPHILS # BLD AUTO: 6.31 X10 (3) UL (ref 1.5–7.7)
NEUTROPHILS # BLD AUTO: 6.31 X10(3) UL (ref 1.5–7.7)
NEUTROPHILS # BLD AUTO: 6.31 X10(3) UL (ref 1.5–7.7)
NEUTROPHILS # BLD AUTO: 7.62 X10 (3) UL (ref 1.5–7.7)
NEUTROPHILS # BLD AUTO: 7.62 X10(3) UL (ref 1.5–7.7)
NEUTROPHILS # BLD AUTO: 7.66 X10 (3) UL (ref 1.5–7.7)
NEUTROPHILS # BLD AUTO: 7.66 X10(3) UL (ref 1.5–7.7)
NEUTROPHILS # BLD AUTO: 9.18 X10 (3) UL (ref 1.5–7.7)
NEUTROPHILS # BLD AUTO: 9.18 X10(3) UL (ref 1.5–7.7)
NEUTROPHILS NFR BLD AUTO: 78 %
NEUTROPHILS NFR BLD AUTO: 79.3 %
NEUTROPHILS NFR BLD AUTO: 83.2 %
NEUTROPHILS NFR BLD AUTO: 83.6 %
NEUTROPHILS NFR BLD AUTO: 83.6 %
NEUTROPHILS NFR BLD AUTO: 84 %
NEUTROPHILS NFR BLD AUTO: 86.2 %
NEUTROPHILS NFR BLD AUTO: 86.3 %
NEUTROPHILS NFR BLD AUTO: 91 %
NITRITE UR QL STRIP.AUTO: NEGATIVE
NITRITE UR QL STRIP.AUTO: NEGATIVE
NT-PROBNP SERPL-MCNC: 4391 PG/ML (ref ?–450)
NT-PROBNP SERPL-MCNC: 4410 PG/ML (ref ?–450)
NT-PROBNP SERPL-MCNC: 7099 PG/ML (ref ?–450)
OSMOLALITY SERPL CALC.SUM OF ELEC: 289 MOSM/KG (ref 275–295)
OSMOLALITY SERPL CALC.SUM OF ELEC: 290 MOSM/KG (ref 275–295)
OSMOLALITY SERPL CALC.SUM OF ELEC: 292 MOSM/KG (ref 275–295)
OSMOLALITY SERPL CALC.SUM OF ELEC: 294 MOSM/KG (ref 275–295)
OSMOLALITY SERPL CALC.SUM OF ELEC: 295 MOSM/KG (ref 275–295)
OSMOLALITY SERPL CALC.SUM OF ELEC: 295 MOSM/KG (ref 275–295)
OSMOLALITY SERPL CALC.SUM OF ELEC: 299 MOSM/KG (ref 275–295)
OSMOLALITY SERPL CALC.SUM OF ELEC: 302 MOSM/KG (ref 275–295)
OSMOLALITY SERPL CALC.SUM OF ELEC: 305 MOSM/KG (ref 275–295)
OXYHGB MFR BLDA: 93.1 % (ref 92–100)
PARAINFLUENZA 1 PCR:: NOT DETECTED
PARAINFLUENZA 2 PCR:: NOT DETECTED
PARAINFLUENZA 3 PCR:: NOT DETECTED
PARAINFLUENZA 4 PCR:: NOT DETECTED
PCO2 BLDA: 52 MM HG (ref 35–45)
PH BLDA: 7.38 [PH] (ref 7.35–7.45)
PH UR STRIP.AUTO: 5.5 [PH] (ref 5–8)
PH UR STRIP.AUTO: 5.5 [PH] (ref 5–8)
PLATELET # BLD AUTO: 213 10(3)UL (ref 150–450)
PLATELET # BLD AUTO: 231 10(3)UL (ref 150–450)
PLATELET # BLD AUTO: 260 10(3)UL (ref 150–450)
PLATELET # BLD AUTO: 267 10(3)UL (ref 150–450)
PLATELET # BLD AUTO: 272 10(3)UL (ref 150–450)
PLATELET # BLD AUTO: 275 10(3)UL (ref 150–450)
PLATELET # BLD AUTO: 279 10(3)UL (ref 150–450)
PLATELET # BLD AUTO: 284 10(3)UL (ref 150–450)
PLATELET # BLD AUTO: 397 10(3)UL (ref 150–450)
PO2 BLDA: 69 MM HG (ref 80–100)
POTASSIUM SERPL-SCNC: 3.4 MMOL/L (ref 3.5–5.1)
POTASSIUM SERPL-SCNC: 3.6 MMOL/L (ref 3.5–5.1)
POTASSIUM SERPL-SCNC: 3.6 MMOL/L (ref 3.5–5.1)
POTASSIUM SERPL-SCNC: 3.8 MMOL/L (ref 3.5–5.1)
POTASSIUM SERPL-SCNC: 4 MMOL/L (ref 3.5–5.1)
POTASSIUM SERPL-SCNC: 4.3 MMOL/L (ref 3.5–5.1)
POTASSIUM SERPL-SCNC: 4.3 MMOL/L (ref 3.5–5.1)
POTASSIUM SERPL-SCNC: 4.4 MMOL/L (ref 3.5–5.1)
POTASSIUM SERPL-SCNC: 4.5 MMOL/L (ref 3.5–5.1)
PROCALCITONIN SERPL-MCNC: 0.23 NG/ML (ref ?–0.05)
PROT SERPL-MCNC: 6.2 G/DL (ref 5.7–8.2)
PROT SERPL-MCNC: 6.4 G/DL (ref 5.7–8.2)
PROT SERPL-MCNC: 6.6 G/DL (ref 5.7–8.2)
PROT SERPL-MCNC: 7.3 G/DL (ref 5.7–8.2)
PROT UR STRIP.AUTO-MCNC: 20 MG/DL
PROTHROMBIN TIME: 16.7 SECONDS (ref 11.6–14.8)
PROTHROMBIN TIME: 17.4 SECONDS (ref 11.6–14.8)
PROTHROMBIN TIME: 18.8 SECONDS (ref 11.6–14.8)
Q-T INTERVAL: 282 MS
Q-T INTERVAL: 318 MS
Q-T INTERVAL: 352 MS
QRS DURATION: 82 MS
QRS DURATION: 88 MS
QRS DURATION: 90 MS
QTC CALCULATION (BEZET): 407 MS
QTC CALCULATION (BEZET): 460 MS
QTC CALCULATION (BEZET): 480 MS
R AXIS: 25 DEGREES
R AXIS: 4 DEGREES
R AXIS: 40 DEGREES
RBC # BLD AUTO: 1.96 X10(6)UL
RBC # BLD AUTO: 2.5 X10(6)UL
RBC # BLD AUTO: 2.59 X10(6)UL
RBC # BLD AUTO: 2.6 X10(6)UL
RBC # BLD AUTO: 2.61 X10(6)UL
RBC # BLD AUTO: 2.64 X10(6)UL
RBC # BLD AUTO: 2.67 X10(6)UL
RBC # BLD AUTO: 2.7 X10(6)UL
RBC # BLD AUTO: 3.02 X10(6)UL
RBC UR QL AUTO: NEGATIVE
RBC UR QL AUTO: NEGATIVE
RH BLOOD TYPE: POSITIVE
RH BLOOD TYPE: POSITIVE
RHINOVIRUS/ENTERO PCR:: NOT DETECTED
RSV RNA SPEC NAA+PROBE: NEGATIVE
RSV RNA SPEC NAA+PROBE: NEGATIVE
RSV RNA SPEC QL NAA+PROBE: NOT DETECTED
SARS-COV-2 RNA NPH QL NAA+NON-PROBE: NOT DETECTED
SARS-COV-2 RNA RESP QL NAA+PROBE: NOT DETECTED
SARS-COV-2 RNA RESP QL NAA+PROBE: NOT DETECTED
SODIUM SERPL-SCNC: 129 MMOL/L (ref 136–145)
SODIUM SERPL-SCNC: 137 MMOL/L (ref 136–145)
SODIUM SERPL-SCNC: 138 MMOL/L (ref 136–145)
SODIUM SERPL-SCNC: 138 MMOL/L (ref 136–145)
SODIUM SERPL-SCNC: 139 MMOL/L (ref 136–145)
SODIUM SERPL-SCNC: 139 MMOL/L (ref 136–145)
SODIUM SERPL-SCNC: 141 MMOL/L (ref 136–145)
SODIUM SERPL-SCNC: 143 MMOL/L (ref 136–145)
SODIUM SERPL-SCNC: 144 MMOL/L (ref 136–145)
SP GR UR STRIP.AUTO: 1.02 (ref 1–1.03)
SP GR UR STRIP.AUTO: 1.02 (ref 1–1.03)
STREP PNEUMO ANTIGEN, URINE: NEGATIVE
T AXIS: 67 DEGREES
T AXIS: 79 DEGREES
T AXIS: 80 DEGREES
TROPONIN I SERPL HS-MCNC: 31 NG/L
TROPONIN I SERPL HS-MCNC: 36 NG/L
UNIT VOLUME: 350 ML
UNIT VOLUME: 350 ML
UROBILINOGEN UR STRIP.AUTO-MCNC: NORMAL MG/DL
UROBILINOGEN UR STRIP.AUTO-MCNC: NORMAL MG/DL
VENTRICULAR RATE: 112 BPM
VENTRICULAR RATE: 125 BPM
VENTRICULAR RATE: 126 BPM
WBC # BLD AUTO: 10.1 X10(3) UL (ref 4–11)
WBC # BLD AUTO: 6.1 X10(3) UL (ref 4–11)
WBC # BLD AUTO: 6.1 X10(3) UL (ref 4–11)
WBC # BLD AUTO: 6.2 X10(3) UL (ref 4–11)
WBC # BLD AUTO: 7.2 X10(3) UL (ref 4–11)
WBC # BLD AUTO: 7.3 X10(3) UL (ref 4–11)
WBC # BLD AUTO: 7.5 X10(3) UL (ref 4–11)
WBC # BLD AUTO: 8.9 X10(3) UL (ref 4–11)
WBC # BLD AUTO: 9.2 X10(3) UL (ref 4–11)

## 2024-01-01 PROCEDURE — 99233 SBSQ HOSP IP/OBS HIGH 50: CPT | Performed by: EMERGENCY MEDICINE

## 2024-01-01 PROCEDURE — 99232 SBSQ HOSP IP/OBS MODERATE 35: CPT | Performed by: INTERNAL MEDICINE

## 2024-01-01 PROCEDURE — 71045 X-RAY EXAM CHEST 1 VIEW: CPT | Performed by: STUDENT IN AN ORGANIZED HEALTH CARE EDUCATION/TRAINING PROGRAM

## 2024-01-01 PROCEDURE — 99223 1ST HOSP IP/OBS HIGH 75: CPT | Performed by: INTERNAL MEDICINE

## 2024-01-01 PROCEDURE — 71045 X-RAY EXAM CHEST 1 VIEW: CPT | Performed by: EMERGENCY MEDICINE

## 2024-01-01 PROCEDURE — 99291 CRITICAL CARE FIRST HOUR: CPT | Performed by: INTERNAL MEDICINE

## 2024-01-01 PROCEDURE — 71045 X-RAY EXAM CHEST 1 VIEW: CPT | Performed by: INTERNAL MEDICINE

## 2024-01-01 PROCEDURE — 80048 BASIC METABOLIC PNL TOTAL CA: CPT

## 2024-01-01 PROCEDURE — 99238 HOSP IP/OBS DSCHRG MGMT 30/<: CPT | Performed by: INTERNAL MEDICINE

## 2024-01-01 PROCEDURE — 85025 COMPLETE CBC W/AUTO DIFF WBC: CPT

## 2024-01-01 PROCEDURE — 99223 1ST HOSP IP/OBS HIGH 75: CPT | Performed by: HOSPITALIST

## 2024-01-01 PROCEDURE — 30233N1 TRANSFUSION OF NONAUTOLOGOUS RED BLOOD CELLS INTO PERIPHERAL VEIN, PERCUTANEOUS APPROACH: ICD-10-PCS | Performed by: STUDENT IN AN ORGANIZED HEALTH CARE EDUCATION/TRAINING PROGRAM

## 2024-01-01 PROCEDURE — 0CJS8ZZ INSPECTION OF LARYNX, VIA NATURAL OR ARTIFICIAL OPENING ENDOSCOPIC: ICD-10-PCS | Performed by: OTOLARYNGOLOGY

## 2024-01-01 PROCEDURE — 99233 SBSQ HOSP IP/OBS HIGH 50: CPT | Performed by: HOSPITALIST

## 2024-01-01 PROCEDURE — 36415 COLL VENOUS BLD VENIPUNCTURE: CPT

## 2024-01-01 PROCEDURE — 93306 TTE W/DOPPLER COMPLETE: CPT | Performed by: INTERNAL MEDICINE

## 2024-01-01 PROCEDURE — 99239 HOSP IP/OBS DSCHRG MGMT >30: CPT | Performed by: INTERNAL MEDICINE

## 2024-01-01 RX ORDER — SODIUM CHLORIDE FOR INHALATION 3 %
3 VIAL, NEBULIZER (ML) INHALATION
Status: DISCONTINUED | OUTPATIENT
Start: 2024-01-01 | End: 2024-01-01

## 2024-01-01 RX ORDER — BENZONATATE 200 MG/1
200 CAPSULE ORAL 3 TIMES DAILY PRN
Status: DISCONTINUED | OUTPATIENT
Start: 2024-01-01 | End: 2024-01-01

## 2024-01-01 RX ORDER — METOCLOPRAMIDE HYDROCHLORIDE 5 MG/ML
5 INJECTION INTRAMUSCULAR; INTRAVENOUS EVERY 8 HOURS PRN
Status: DISCONTINUED | OUTPATIENT
Start: 2024-01-01 | End: 2024-01-01

## 2024-01-01 RX ORDER — METOPROLOL TARTRATE 1 MG/ML
2.5 INJECTION, SOLUTION INTRAVENOUS ONCE
Status: COMPLETED | OUTPATIENT
Start: 2024-01-01 | End: 2024-01-01

## 2024-01-01 RX ORDER — ALPRAZOLAM 0.5 MG
0.5 TABLET ORAL DAILY
COMMUNITY
Start: 2024-01-01

## 2024-01-01 RX ORDER — ECHINACEA PURPUREA EXTRACT 125 MG
1 TABLET ORAL
Status: DISCONTINUED | OUTPATIENT
Start: 2024-01-01 | End: 2024-01-01

## 2024-01-01 RX ORDER — METRONIDAZOLE 500 MG/1
500 TABLET ORAL EVERY 12 HOURS SCHEDULED
Status: DISCONTINUED | OUTPATIENT
Start: 2024-01-01 | End: 2024-01-01

## 2024-01-01 RX ORDER — SENNOSIDES 8.6 MG
17.2 TABLET ORAL NIGHTLY PRN
Status: DISCONTINUED | OUTPATIENT
Start: 2024-01-01 | End: 2024-01-01

## 2024-01-01 RX ORDER — ONDANSETRON 2 MG/ML
4 INJECTION INTRAMUSCULAR; INTRAVENOUS EVERY 6 HOURS PRN
Status: DISCONTINUED | OUTPATIENT
Start: 2024-01-01 | End: 2024-01-01

## 2024-01-01 RX ORDER — PANTOPRAZOLE SODIUM 40 MG/1
40 TABLET, DELAYED RELEASE ORAL
Status: DISCONTINUED | OUTPATIENT
Start: 2024-01-01 | End: 2024-01-01

## 2024-01-01 RX ORDER — METOPROLOL TARTRATE 1 MG/ML
5 INJECTION, SOLUTION INTRAVENOUS EVERY 4 HOURS
Status: DISCONTINUED | OUTPATIENT
Start: 2024-01-01 | End: 2024-01-01

## 2024-01-01 RX ORDER — METOPROLOL TARTRATE 1 MG/ML
5 INJECTION, SOLUTION INTRAVENOUS EVERY 6 HOURS PRN
Status: ACTIVE | OUTPATIENT
Start: 2024-01-01 | End: 2024-01-01

## 2024-01-01 RX ORDER — BISACODYL 10 MG
10 SUPPOSITORY, RECTAL RECTAL
Status: DISCONTINUED | OUTPATIENT
Start: 2024-01-01 | End: 2024-01-01

## 2024-01-01 RX ORDER — ECHINACEA PURPUREA EXTRACT 125 MG
1 TABLET ORAL 3 TIMES DAILY
Status: DISCONTINUED | OUTPATIENT
Start: 2024-01-01 | End: 2024-01-01

## 2024-01-01 RX ORDER — SODIUM BICARBONATE 325 MG/1
325 TABLET ORAL AS NEEDED
Status: DISCONTINUED | OUTPATIENT
Start: 2024-01-01 | End: 2024-01-01

## 2024-01-01 RX ORDER — ALBUTEROL SULFATE 0.83 MG/ML
2.5 SOLUTION RESPIRATORY (INHALATION) ONCE
Status: DISCONTINUED | OUTPATIENT
Start: 2024-01-01 | End: 2024-01-01

## 2024-01-01 RX ORDER — ACETAMINOPHEN 500 MG
500 TABLET ORAL EVERY 4 HOURS PRN
Status: DISCONTINUED | OUTPATIENT
Start: 2024-01-01 | End: 2024-01-01

## 2024-01-01 RX ORDER — ACETAMINOPHEN 160 MG/5ML
650 SOLUTION ORAL ONCE
Status: COMPLETED | OUTPATIENT
Start: 2024-01-01 | End: 2024-01-01

## 2024-01-01 RX ORDER — FUROSEMIDE 10 MG/ML
20 INJECTION INTRAMUSCULAR; INTRAVENOUS ONCE
Status: COMPLETED | OUTPATIENT
Start: 2024-01-01 | End: 2024-01-01

## 2024-01-01 RX ORDER — ALBUTEROL SULFATE 0.83 MG/ML
1.25 SOLUTION RESPIRATORY (INHALATION)
Status: DISCONTINUED | OUTPATIENT
Start: 2024-01-01 | End: 2024-01-01

## 2024-01-01 RX ORDER — METOPROLOL TARTRATE 25 MG/1
12.5 TABLET, FILM COATED ORAL
Qty: 90 TABLET | Refills: 3 | Status: SHIPPED | OUTPATIENT
Start: 2024-01-01 | End: 2024-11-17

## 2024-01-01 RX ORDER — POTASSIUM CHLORIDE 1.5 G/1.58G
40 POWDER, FOR SOLUTION ORAL ONCE
Status: COMPLETED | OUTPATIENT
Start: 2024-01-01 | End: 2024-01-01

## 2024-01-01 RX ORDER — DIPHENHYDRAMINE HCL 25 MG
25 CAPSULE ORAL NIGHTLY PRN
Status: DISCONTINUED | OUTPATIENT
Start: 2024-01-01 | End: 2024-01-01

## 2024-01-01 RX ORDER — ATORVASTATIN CALCIUM 10 MG/1
10 TABLET, FILM COATED ORAL NIGHTLY
Status: DISCONTINUED | OUTPATIENT
Start: 2024-01-01 | End: 2024-01-01

## 2024-01-01 RX ORDER — SODIUM CHLORIDE 9 MG/ML
INJECTION, SOLUTION INTRAVENOUS ONCE
Status: COMPLETED | OUTPATIENT
Start: 2024-01-01 | End: 2024-01-01

## 2024-01-01 RX ORDER — SODIUM CHLORIDE 9 MG/ML
INJECTION, SOLUTION INTRAVENOUS CONTINUOUS
Status: DISCONTINUED | OUTPATIENT
Start: 2024-01-01 | End: 2024-01-01

## 2024-01-01 RX ORDER — CIPROFLOXACIN 500 MG/1
500 TABLET, FILM COATED ORAL
Status: DISCONTINUED | OUTPATIENT
Start: 2024-01-01 | End: 2024-01-01

## 2024-01-01 RX ORDER — METOPROLOL TARTRATE 25 MG/1
25 TABLET, FILM COATED ORAL 2 TIMES DAILY
Status: DISCONTINUED | OUTPATIENT
Start: 2024-01-01 | End: 2024-01-01

## 2024-01-01 RX ORDER — CIPROFLOXACIN 500 MG/1
500 TABLET, FILM COATED ORAL 2 TIMES DAILY
Qty: 17 TABLET | Refills: 0 | Status: ON HOLD | OUTPATIENT
Start: 2024-01-01 | End: 2024-01-01 | Stop reason: CLARIF

## 2024-01-01 RX ORDER — POLYETHYLENE GLYCOL 3350 17 G/17G
17 POWDER, FOR SOLUTION ORAL DAILY PRN
Status: DISCONTINUED | OUTPATIENT
Start: 2024-01-01 | End: 2024-01-01

## 2024-01-01 RX ORDER — POTASSIUM CHLORIDE 1500 MG/1
40 TABLET, EXTENDED RELEASE ORAL ONCE
Status: COMPLETED | OUTPATIENT
Start: 2024-01-01 | End: 2024-01-01

## 2024-01-01 RX ORDER — DILTIAZEM HYDROCHLORIDE 5 MG/ML
10 INJECTION INTRAVENOUS EVERY 2 HOUR PRN
Status: DISCONTINUED | OUTPATIENT
Start: 2024-01-01 | End: 2024-01-01

## 2024-01-01 RX ORDER — LEVOTHYROXINE SODIUM 50 UG/1
50 TABLET ORAL
Status: DISCONTINUED | OUTPATIENT
Start: 2024-01-01 | End: 2024-01-01

## 2024-01-01 RX ORDER — METOPROLOL TARTRATE 1 MG/ML
5 INJECTION, SOLUTION INTRAVENOUS ONCE
Status: COMPLETED | OUTPATIENT
Start: 2024-01-01 | End: 2024-01-01

## 2024-01-01 RX ORDER — PANTOPRAZOLE SODIUM 40 MG/1
40 TABLET, DELAYED RELEASE ORAL
Qty: 60 TABLET | Refills: 0 | Status: SHIPPED | OUTPATIENT
Start: 2024-01-01 | End: 2024-11-17

## 2024-01-01 RX ORDER — METOPROLOL TARTRATE 1 MG/ML
5 INJECTION, SOLUTION INTRAVENOUS EVERY 4 HOURS PRN
Status: DISCONTINUED | OUTPATIENT
Start: 2024-01-01 | End: 2024-01-01

## 2024-01-01 RX ORDER — ASPIRIN 81 MG/1
81 TABLET, CHEWABLE ORAL DAILY
Status: DISCONTINUED | OUTPATIENT
Start: 2024-01-01 | End: 2024-01-01

## 2024-01-01 RX ORDER — ENOXAPARIN SODIUM 100 MG/ML
40 INJECTION SUBCUTANEOUS NIGHTLY
Status: DISCONTINUED | OUTPATIENT
Start: 2024-01-01 | End: 2024-01-01

## 2024-01-01 RX ORDER — ESOMEPRAZOLE MAGNESIUM 40 MG/1
CAPSULE, DELAYED RELEASE ORAL
Qty: 60 CAPSULE | Refills: 1 | Status: SHIPPED | OUTPATIENT
Start: 2024-01-01 | End: 2024-01-01

## 2024-01-01 RX ORDER — SODIUM PHOSPHATE, DIBASIC AND SODIUM PHOSPHATE, MONOBASIC 7; 19 G/230ML; G/230ML
1 ENEMA RECTAL ONCE AS NEEDED
Status: DISCONTINUED | OUTPATIENT
Start: 2024-01-01 | End: 2024-01-01

## 2024-01-01 RX ORDER — WATER 10 ML/10ML
INJECTION INTRAMUSCULAR; INTRAVENOUS; SUBCUTANEOUS
Status: DISPENSED
Start: 2024-01-01 | End: 2024-01-01

## 2024-01-01 RX ORDER — ASPIRIN 81 MG/1
81 TABLET ORAL DAILY
Status: DISCONTINUED | OUTPATIENT
Start: 2024-01-01 | End: 2024-01-01

## 2024-01-01 RX ORDER — ALPRAZOLAM 0.5 MG
0.5 TABLET ORAL DAILY PRN
Status: DISCONTINUED | OUTPATIENT
Start: 2024-01-01 | End: 2024-01-01

## 2024-01-01 RX ORDER — IPRATROPIUM BROMIDE AND ALBUTEROL SULFATE 2.5; .5 MG/3ML; MG/3ML
3 SOLUTION RESPIRATORY (INHALATION) EVERY 4 HOURS PRN
Status: DISCONTINUED | OUTPATIENT
Start: 2024-01-01 | End: 2024-01-01

## 2024-03-06 NOTE — SLP NOTE
ADULT SWALLOWING EVALUATION    ASSESSMENT    ASSESSMENT/OVERALL IMPRESSION:  BSSE completed. Pt not known to our service previously. Pt admitted for difficulty breathing after choking on a pill.  Pt with a hx/o squamous cell throat cancer that was treated 1 Situation: Home with spouse  Diet Prior to Admission: Regular; Thin liquids           SWALLOWING HISTORY  Current Diet Consistency: NPO  Dysphagia History: As stated above  Imaging Results: CONCLUSION:  Patchy infiltrates in the mid-lungs and bases bilatera 11/30/20    Thank you for your referral.   If you have any questions, please contact Prieto Hudson Constantino Garcia

## 2024-04-10 ENCOUNTER — EXTERNAL RECORD (OUTPATIENT)
Dept: HEALTH INFORMATION MANAGEMENT | Facility: OTHER | Age: 79
End: 2024-04-10

## 2024-04-25 ENCOUNTER — TELEPHONE (OUTPATIENT)
Dept: FAMILY MEDICINE | Age: 79
End: 2024-04-25

## 2024-04-25 RX ORDER — LEVOTHYROXINE SODIUM 0.05 MG/1
50 TABLET ORAL DAILY
Qty: 90 TABLET | Refills: 1 | Status: SHIPPED | OUTPATIENT
Start: 2024-04-25

## 2024-06-07 ENCOUNTER — HOSPITAL ENCOUNTER (OUTPATIENT)
Dept: LAB | Facility: HOSPITAL | Age: 79
Discharge: HOME | End: 2024-06-07
Attending: INTERNAL MEDICINE
Payer: MEDICARE

## 2024-06-07 LAB
ALBUMIN SERPL-MCNC: 3.8 G/DL (ref 3.4–5)
ALBUMIN/GLOB SERPL: 1 {RATIO} (ref 1–2)
ALP LIVER SERPL-CCNC: 116 U/L
ALT SERPL-CCNC: 26 U/L
ANION GAP SERPL CALC-SCNC: 6 MMOL/L (ref 0–18)
AST SERPL-CCNC: 34 U/L (ref 15–37)
BILIRUB SERPL-MCNC: 1.6 MG/DL (ref 0.1–2)
BUN BLD-MCNC: 19 MG/DL (ref 9–23)
CALCIUM BLD-MCNC: 9.4 MG/DL (ref 8.5–10.1)
CHLORIDE SERPL-SCNC: 101 MMOL/L (ref 98–112)
CHOLEST SERPL-MCNC: 147 MG/DL (ref ?–200)
CO2 SERPL-SCNC: 31 MMOL/L (ref 21–32)
CREAT BLD-MCNC: 1.06 MG/DL
EGFRCR SERPLBLD CKD-EPI 2021: 71 ML/MIN/1.73M2 (ref 60–?)
FASTING PATIENT LIPID ANSWER: YES
FASTING STATUS PATIENT QL REPORTED: YES
GLOBULIN PLAS-MCNC: 3.9 G/DL (ref 2.8–4.4)
GLUCOSE BLD-MCNC: 95 MG/DL (ref 70–99)
HDLC SERPL-MCNC: 84 MG/DL (ref 40–59)
LDLC SERPL CALC-MCNC: 53 MG/DL (ref ?–100)
NONHDLC SERPL-MCNC: 63 MG/DL (ref ?–130)
OSMOLALITY SERPL CALC.SUM OF ELEC: 288 MOSM/KG (ref 275–295)
POTASSIUM SERPL-SCNC: 3.9 MMOL/L (ref 3.5–5.1)
PROT SERPL-MCNC: 7.7 G/DL (ref 6.4–8.2)
SODIUM SERPL-SCNC: 138 MMOL/L (ref 136–145)
TRIGL SERPL-MCNC: 45 MG/DL (ref 30–149)
VLDLC SERPL CALC-MCNC: 6 MG/DL (ref 0–30)

## 2024-06-07 PROCEDURE — 36415 COLL VENOUS BLD VENIPUNCTURE: CPT | Performed by: INTERNAL MEDICINE

## 2024-06-07 PROCEDURE — 80061 LIPID PANEL: CPT | Performed by: INTERNAL MEDICINE

## 2024-06-07 PROCEDURE — 80053 COMPREHEN METABOLIC PANEL: CPT | Performed by: INTERNAL MEDICINE

## 2024-09-05 ENCOUNTER — OFFICE VISIT (OUTPATIENT)
Dept: FAMILY MEDICINE | Age: 79
End: 2024-09-05

## 2024-09-05 VITALS
HEART RATE: 109 BPM | WEIGHT: 136.69 LBS | RESPIRATION RATE: 16 BRPM | OXYGEN SATURATION: 95 % | BODY MASS INDEX: 19.61 KG/M2 | SYSTOLIC BLOOD PRESSURE: 133 MMHG | DIASTOLIC BLOOD PRESSURE: 87 MMHG

## 2024-09-05 DIAGNOSIS — I70.0 ATHEROSCLEROSIS OF AORTA (CMD): ICD-10-CM

## 2024-09-05 DIAGNOSIS — I42.0 DILATED CARDIOMYOPATHY  (CMD): ICD-10-CM

## 2024-09-05 DIAGNOSIS — I48.20 CHRONIC ATRIAL FIBRILLATION  (CMD): ICD-10-CM

## 2024-09-05 DIAGNOSIS — N18.32 CHRONIC RENAL IMPAIRMENT, STAGE 3B  (CMD): ICD-10-CM

## 2024-09-05 DIAGNOSIS — Z76.0 MEDICATION REFILL: Primary | ICD-10-CM

## 2024-09-05 DIAGNOSIS — Z93.1 GASTROSTOMY STATUS  (CMD): ICD-10-CM

## 2024-09-05 DIAGNOSIS — C09.9 TONSILLAR CANCER  (CMD): ICD-10-CM

## 2024-09-05 DIAGNOSIS — E44.0 MODERATE PROTEIN-CALORIE MALNUTRITION  (CMD): ICD-10-CM

## 2024-09-05 DIAGNOSIS — J44.9 CHRONIC OBSTRUCTIVE PULMONARY DISEASE, UNSPECIFIED COPD TYPE  (CMD): ICD-10-CM

## 2024-09-05 RX ORDER — LEVOTHYROXINE SODIUM 50 UG/1
50 TABLET ORAL DAILY
Qty: 90 TABLET | Refills: 1 | Status: SHIPPED | OUTPATIENT
Start: 2024-09-05

## 2024-09-05 RX ORDER — ALPRAZOLAM 0.5 MG
0.5 TABLET ORAL DAILY
Qty: 90 TABLET | Refills: 0 | Status: SHIPPED | OUTPATIENT
Start: 2024-09-05

## 2024-09-05 RX ORDER — TADALAFIL 10 MG/1
10 TABLET ORAL DAILY PRN
COMMUNITY
Start: 2024-04-27 | End: 2024-09-05 | Stop reason: SDUPTHER

## 2024-09-05 RX ORDER — TADALAFIL 10 MG/1
10 TABLET ORAL DAILY PRN
Qty: 90 TABLET | Refills: 0 | Status: SHIPPED | OUTPATIENT
Start: 2024-09-05 | End: 2025-09-05

## 2024-09-26 ENCOUNTER — HOSPITAL ENCOUNTER (OUTPATIENT)
Dept: CT IMAGING | Facility: HOSPITAL | Age: 79
Discharge: HOME | End: 2024-09-26
Attending: NURSE PRACTITIONER
Payer: MEDICARE

## 2024-09-26 DIAGNOSIS — I65.23 ASYMPTOMATIC CAROTID ARTERY STENOSIS, BILATERAL: ICD-10-CM

## 2024-09-26 LAB
CREAT BLD-MCNC: 1.1 MG/DL
EGFRCR SERPLBLD CKD-EPI 2021: 68 ML/MIN/1.73M2 (ref 60–?)

## 2024-09-26 PROCEDURE — 82565 ASSAY OF CREATININE: CPT

## 2024-09-26 PROCEDURE — 70498 CT ANGIOGRAPHY NECK: CPT | Performed by: NURSE PRACTITIONER

## 2024-10-04 ENCOUNTER — HOSPITAL ENCOUNTER (OUTPATIENT)
Dept: LAB | Facility: HOSPITAL | Age: 79
Discharge: HOME | End: 2024-10-04
Attending: SURGERY
Payer: MEDICARE

## 2024-10-04 DIAGNOSIS — I65.21 STENOSIS OF RIGHT CAROTID ARTERY: ICD-10-CM

## 2024-10-04 LAB — P2Y12 REACTION UNITS: 221 PRU

## 2024-10-04 PROCEDURE — 36415 COLL VENOUS BLD VENIPUNCTURE: CPT

## 2024-10-04 PROCEDURE — 85576 BLOOD PLATELET AGGREGATION: CPT

## 2024-10-11 ENCOUNTER — LAB ENCOUNTER (OUTPATIENT)
Dept: LAB | Facility: HOSPITAL | Age: 79
End: 2024-10-11
Attending: SURGERY
Payer: MEDICARE

## 2024-10-11 ENCOUNTER — EKG ENCOUNTER (OUTPATIENT)
Dept: LAB | Facility: HOSPITAL | Age: 79
End: 2024-10-11
Attending: SURGERY
Payer: MEDICARE

## 2024-10-11 DIAGNOSIS — Z01.818 PRE-OP TESTING: ICD-10-CM

## 2024-10-11 DIAGNOSIS — Z91.89 AT RISK FOR BLEEDING: ICD-10-CM

## 2024-10-11 DIAGNOSIS — I65.21 CAROTID STENOSIS, RIGHT: ICD-10-CM

## 2024-10-11 LAB
ALBUMIN SERPL-MCNC: 4.5 G/DL (ref 3.2–4.8)
ALBUMIN/GLOB SERPL: 1.4 {RATIO} (ref 1–2)
ALP LIVER SERPL-CCNC: 99 U/L
ALT SERPL-CCNC: 16 U/L
ANION GAP SERPL CALC-SCNC: 6 MMOL/L (ref 0–18)
ANTIBODY SCREEN: NEGATIVE
APTT PPP: 34.1 SECONDS (ref 23–36)
AST SERPL-CCNC: 24 U/L (ref ?–34)
BASOPHILS # BLD AUTO: 0.01 X10(3) UL (ref 0–0.2)
BASOPHILS NFR BLD AUTO: 0.2 %
BILIRUB SERPL-MCNC: 1.8 MG/DL (ref 0.2–1.1)
BUN BLD-MCNC: 29 MG/DL (ref 9–23)
CALCIUM BLD-MCNC: 9.9 MG/DL (ref 8.7–10.4)
CHLORIDE SERPL-SCNC: 97 MMOL/L (ref 98–112)
CO2 SERPL-SCNC: 31 MMOL/L (ref 21–32)
CREAT BLD-MCNC: 1.02 MG/DL
EGFRCR SERPLBLD CKD-EPI 2021: 74 ML/MIN/1.73M2 (ref 60–?)
EOSINOPHIL # BLD AUTO: 0.07 X10(3) UL (ref 0–0.7)
EOSINOPHIL NFR BLD AUTO: 1.6 %
ERYTHROCYTE [DISTWIDTH] IN BLOOD BY AUTOMATED COUNT: 13 %
FASTING STATUS PATIENT QL REPORTED: NO
GLOBULIN PLAS-MCNC: 3.2 G/DL (ref 2–3.5)
GLUCOSE BLD-MCNC: 109 MG/DL (ref 70–99)
HCT VFR BLD AUTO: 38.2 %
HGB BLD-MCNC: 13.3 G/DL
IMM GRANULOCYTES # BLD AUTO: 0.01 X10(3) UL (ref 0–1)
IMM GRANULOCYTES NFR BLD: 0.2 %
INR BLD: 1.41 (ref 0.8–1.2)
LYMPHOCYTES # BLD AUTO: 0.39 X10(3) UL (ref 1–4)
LYMPHOCYTES NFR BLD AUTO: 9.1 %
MCH RBC QN AUTO: 33.9 PG (ref 26–34)
MCHC RBC AUTO-ENTMCNC: 34.8 G/DL (ref 31–37)
MCV RBC AUTO: 97.4 FL
MONOCYTES # BLD AUTO: 0.48 X10(3) UL (ref 0.1–1)
MONOCYTES NFR BLD AUTO: 11.2 %
NEUTROPHILS # BLD AUTO: 3.33 X10 (3) UL (ref 1.5–7.7)
NEUTROPHILS # BLD AUTO: 3.33 X10(3) UL (ref 1.5–7.7)
NEUTROPHILS NFR BLD AUTO: 77.7 %
OSMOLALITY SERPL CALC.SUM OF ELEC: 284 MOSM/KG (ref 275–295)
PLATELET # BLD AUTO: 101 10(3)UL (ref 150–450)
PLATELETS.RETICULATED NFR BLD AUTO: 5.6 % (ref 0–7)
POTASSIUM SERPL-SCNC: 4 MMOL/L (ref 3.5–5.1)
PROT SERPL-MCNC: 7.7 G/DL (ref 5.7–8.2)
PROTHROMBIN TIME: 17.4 SECONDS (ref 11.6–14.8)
RBC # BLD AUTO: 3.92 X10(6)UL
RH BLOOD TYPE: POSITIVE
SODIUM SERPL-SCNC: 134 MMOL/L (ref 136–145)
WBC # BLD AUTO: 4.3 X10(3) UL (ref 4–11)

## 2024-10-11 PROCEDURE — 85610 PROTHROMBIN TIME: CPT

## 2024-10-11 PROCEDURE — 93010 ELECTROCARDIOGRAM REPORT: CPT | Performed by: INTERNAL MEDICINE

## 2024-10-11 PROCEDURE — 80053 COMPREHEN METABOLIC PANEL: CPT

## 2024-10-11 PROCEDURE — 86920 COMPATIBILITY TEST SPIN: CPT

## 2024-10-11 PROCEDURE — 85730 THROMBOPLASTIN TIME PARTIAL: CPT

## 2024-10-11 PROCEDURE — 93005 ELECTROCARDIOGRAM TRACING: CPT

## 2024-10-11 PROCEDURE — 86850 RBC ANTIBODY SCREEN: CPT

## 2024-10-11 PROCEDURE — 86900 BLOOD TYPING SEROLOGIC ABO: CPT

## 2024-10-11 PROCEDURE — 85025 COMPLETE CBC W/AUTO DIFF WBC: CPT

## 2024-10-11 PROCEDURE — 36415 COLL VENOUS BLD VENIPUNCTURE: CPT

## 2024-10-11 PROCEDURE — 86901 BLOOD TYPING SEROLOGIC RH(D): CPT

## 2024-10-11 RX ORDER — SIMVASTATIN 20 MG
20 TABLET ORAL NIGHTLY
COMMUNITY

## 2024-10-11 RX ORDER — ASPIRIN 81 MG/1
81 TABLET ORAL DAILY
COMMUNITY

## 2024-10-11 NOTE — PAT NURSING NOTE
Pre Procedure Instructions for Right TCAR with Dr. Jones on 10/15    Instructions reviewed with patient's wife with patient.     PATs scheduled on 10/11 at 1:00 pm at . Pre surgical instructions reviewed with patient. Tenative time of arrival for surgery 0600, will call patient day before to confirm arrival time. [place] at [facility] registration desk. Park in [facility]. Admit overnight. Shower with hibiclens, no lotions, powders, ointments. Do not shave below neck. No jewelry. Strict NPO at 2200 night before. Morning of surgery, take only aspirin, brilinta and simvastatin with small sip of water. No gum, candy, food, mints, coffee, liquids, etc. STOP all over the counter vitamins, supplements, nsaids, starting now. Continue aspirin, statin and brilinta. Take LAST dose of Eliquis on Saturday evening 10/12 PM. Patient and spouse verbalized understanding of all instructions.       Patient has G tube, due to history of throat cancer s/p radiation. Nothing by mouth. Tube feedings.    Dr. Jones office notified that patient has not been taking simvastatin as prescribed. Patient is unsure how long he has not taken it.

## 2024-10-12 LAB
ATRIAL RATE: 98 BPM
Q-T INTERVAL: 352 MS
QRS DURATION: 94 MS
QTC CALCULATION (BEZET): 432 MS
R AXIS: -9 DEGREES
T AXIS: 60 DEGREES
VENTRICULAR RATE: 91 BPM

## 2024-10-14 ENCOUNTER — ANESTHESIA EVENT (OUTPATIENT)
Dept: CARDIAC SURGERY | Facility: HOSPITAL | Age: 79
End: 2024-10-14
Payer: MEDICARE

## 2024-10-15 ENCOUNTER — ANESTHESIA (OUTPATIENT)
Dept: CARDIAC SURGERY | Facility: HOSPITAL | Age: 79
End: 2024-10-15
Payer: MEDICARE

## 2024-10-15 ENCOUNTER — HOSPITAL ENCOUNTER (INPATIENT)
Facility: HOSPITAL | Age: 79
LOS: 2 days | Discharge: HOME | End: 2024-10-17
Attending: SURGERY | Admitting: SURGERY
Payer: MEDICARE

## 2024-10-15 DIAGNOSIS — Z91.89 AT RISK FOR BLEEDING: ICD-10-CM

## 2024-10-15 DIAGNOSIS — I65.21 CAROTID STENOSIS, RIGHT: ICD-10-CM

## 2024-10-15 DIAGNOSIS — R19.5 OCCULT BLOOD IN STOOLS: ICD-10-CM

## 2024-10-15 DIAGNOSIS — Z01.818 PRE-OP TESTING: Primary | ICD-10-CM

## 2024-10-15 LAB
APTT PPP: 28.5 SECONDS (ref 23–36)
BASE EXCESS BLD CALC-SCNC: 1 MMOL/L
CA-I BLD-SCNC: 1.18 MMOL/L (ref 1.12–1.32)
CO2 BLD-SCNC: 28 MMOL/L (ref 22–32)
ERYTHROCYTE [DISTWIDTH] IN BLOOD BY AUTOMATED COUNT: 12.7 %
GLUCOSE BLD-MCNC: 105 MG/DL (ref 70–99)
HCO3 BLD-SCNC: 26.3 MEQ/L
HCT VFR BLD AUTO: 27 %
HCT VFR BLD CALC: 29 %
HGB BLD-MCNC: 9.5 G/DL
ISTAT ACTIVATED CLOTTING TIME: 140 SECONDS (ref 74–137)
ISTAT ACTIVATED CLOTTING TIME: 140 SECONDS (ref 74–137)
ISTAT ACTIVATED CLOTTING TIME: 312 SECONDS (ref 74–137)
MCH RBC QN AUTO: 34.8 PG (ref 26–34)
MCHC RBC AUTO-ENTMCNC: 35.2 G/DL (ref 31–37)
MCV RBC AUTO: 98.9 FL
MRSA DNA SPEC QL NAA+PROBE: NEGATIVE
P2Y12 REACTION UNITS: 213 PRU
PCO2 BLD: 42.8 MMHG
PH BLD: 7.4 [PH]
PLATELET # BLD AUTO: 140 10(3)UL (ref 150–450)
PO2 BLD: 375 MMHG
POTASSIUM BLD-SCNC: 3.5 MMOL/L (ref 3.6–5.1)
RBC # BLD AUTO: 2.73 X10(6)UL
SAO2 % BLD: 100 %
SODIUM BLD-SCNC: 135 MMOL/L (ref 136–145)
WBC # BLD AUTO: 8.4 X10(3) UL (ref 4–11)

## 2024-10-15 PROCEDURE — 99223 1ST HOSP IP/OBS HIGH 75: CPT | Performed by: HOSPITALIST

## 2024-10-15 PROCEDURE — B313YZZ FLUOROSCOPY OF RIGHT COMMON CAROTID ARTERY USING OTHER CONTRAST: ICD-10-PCS | Performed by: SURGERY

## 2024-10-15 PROCEDURE — 037K3EZ DILATION OF RIGHT INTERNAL CAROTID ARTERY WITH TWO INTRALUMINAL DEVICES, PERCUTANEOUS APPROACH: ICD-10-PCS | Performed by: SURGERY

## 2024-10-15 DEVICE — 9-7MM X 40MM
Type: IMPLANTABLE DEVICE | Site: NECK | Status: FUNCTIONAL
Brand: ENROUTE TRANSCAROTID STENT

## 2024-10-15 DEVICE — 9 MM X 30 MM
Type: IMPLANTABLE DEVICE | Site: NECK | Status: FUNCTIONAL
Brand: ENROUTE TRANSCAROTID STENT

## 2024-10-15 RX ORDER — HYDROMORPHONE HYDROCHLORIDE 1 MG/ML
0.2 INJECTION, SOLUTION INTRAMUSCULAR; INTRAVENOUS; SUBCUTANEOUS EVERY 5 MIN PRN
Status: ACTIVE | OUTPATIENT
Start: 2024-10-15 | End: 2024-10-15

## 2024-10-15 RX ORDER — NALOXONE HYDROCHLORIDE 0.4 MG/ML
0.08 INJECTION, SOLUTION INTRAMUSCULAR; INTRAVENOUS; SUBCUTANEOUS AS NEEDED
Status: ACTIVE | OUTPATIENT
Start: 2024-10-15 | End: 2024-10-15

## 2024-10-15 RX ORDER — LEVOTHYROXINE SODIUM 50 UG/1
50 TABLET ORAL
Status: DISCONTINUED | OUTPATIENT
Start: 2024-10-15 | End: 2024-10-17

## 2024-10-15 RX ORDER — BUPIVACAINE HYDROCHLORIDE 5 MG/ML
INJECTION, SOLUTION EPIDURAL; INTRACAUDAL AS NEEDED
Status: DISCONTINUED | OUTPATIENT
Start: 2024-10-15 | End: 2024-10-15 | Stop reason: HOSPADM

## 2024-10-15 RX ORDER — METOCLOPRAMIDE HYDROCHLORIDE 5 MG/ML
5 INJECTION INTRAMUSCULAR; INTRAVENOUS EVERY 8 HOURS PRN
Status: DISCONTINUED | OUTPATIENT
Start: 2024-10-15 | End: 2024-10-15

## 2024-10-15 RX ORDER — ONDANSETRON 2 MG/ML
4 INJECTION INTRAMUSCULAR; INTRAVENOUS EVERY 6 HOURS PRN
Status: DISCONTINUED | OUTPATIENT
Start: 2024-10-15 | End: 2024-10-15

## 2024-10-15 RX ORDER — HEPARIN SODIUM 1000 [USP'U]/ML
INJECTION, SOLUTION INTRAVENOUS; SUBCUTANEOUS AS NEEDED
Status: DISCONTINUED | OUTPATIENT
Start: 2024-10-15 | End: 2024-10-15 | Stop reason: SURG

## 2024-10-15 RX ORDER — DEXAMETHASONE SODIUM PHOSPHATE 4 MG/ML
VIAL (ML) INJECTION AS NEEDED
Status: DISCONTINUED | OUTPATIENT
Start: 2024-10-15 | End: 2024-10-15 | Stop reason: SURG

## 2024-10-15 RX ORDER — HYDROMORPHONE HYDROCHLORIDE 1 MG/ML
0.6 INJECTION, SOLUTION INTRAMUSCULAR; INTRAVENOUS; SUBCUTANEOUS EVERY 5 MIN PRN
Status: ACTIVE | OUTPATIENT
Start: 2024-10-15 | End: 2024-10-15

## 2024-10-15 RX ORDER — BISACODYL 10 MG
10 SUPPOSITORY, RECTAL RECTAL
Status: DISCONTINUED | OUTPATIENT
Start: 2024-10-15 | End: 2024-10-17

## 2024-10-15 RX ORDER — ATORVASTATIN CALCIUM 20 MG/1
20 TABLET, FILM COATED ORAL NIGHTLY
Status: DISCONTINUED | OUTPATIENT
Start: 2024-10-15 | End: 2024-10-17

## 2024-10-15 RX ORDER — LIDOCAINE HYDROCHLORIDE 10 MG/ML
INJECTION, SOLUTION EPIDURAL; INFILTRATION; INTRACAUDAL; PERINEURAL AS NEEDED
Status: DISCONTINUED | OUTPATIENT
Start: 2024-10-15 | End: 2024-10-15 | Stop reason: SURG

## 2024-10-15 RX ORDER — POLYETHYLENE GLYCOL 3350 17 G/17G
17 POWDER, FOR SOLUTION ORAL DAILY PRN
Status: DISCONTINUED | OUTPATIENT
Start: 2024-10-15 | End: 2024-10-17

## 2024-10-15 RX ORDER — HYDROCODONE BITARTRATE AND ACETAMINOPHEN 5; 325 MG/1; MG/1
2 TABLET ORAL EVERY 4 HOURS PRN
Status: DISCONTINUED | OUTPATIENT
Start: 2024-10-15 | End: 2024-10-17

## 2024-10-15 RX ORDER — ONDANSETRON 2 MG/ML
INJECTION INTRAMUSCULAR; INTRAVENOUS AS NEEDED
Status: DISCONTINUED | OUTPATIENT
Start: 2024-10-15 | End: 2024-10-15 | Stop reason: SURG

## 2024-10-15 RX ORDER — NITROGLYCERIN 20 MG/100ML
INJECTION INTRAVENOUS
Status: DISCONTINUED | OUTPATIENT
Start: 2024-10-15 | End: 2024-10-17

## 2024-10-15 RX ORDER — PHENYLEPHRINE HCL 10 MG/ML
VIAL (ML) INJECTION AS NEEDED
Status: DISCONTINUED | OUTPATIENT
Start: 2024-10-15 | End: 2024-10-15 | Stop reason: SURG

## 2024-10-15 RX ORDER — SODIUM PHOSPHATE, DIBASIC AND SODIUM PHOSPHATE, MONOBASIC 7; 19 G/230ML; G/230ML
1 ENEMA RECTAL ONCE AS NEEDED
Status: DISCONTINUED | OUTPATIENT
Start: 2024-10-15 | End: 2024-10-17

## 2024-10-15 RX ORDER — HYDROMORPHONE HYDROCHLORIDE 1 MG/ML
0.4 INJECTION, SOLUTION INTRAMUSCULAR; INTRAVENOUS; SUBCUTANEOUS EVERY 5 MIN PRN
Status: ACTIVE | OUTPATIENT
Start: 2024-10-15 | End: 2024-10-15

## 2024-10-15 RX ORDER — HYDROCODONE BITARTRATE AND ACETAMINOPHEN 5; 325 MG/1; MG/1
1 TABLET ORAL EVERY 4 HOURS PRN
Status: DISCONTINUED | OUTPATIENT
Start: 2024-10-15 | End: 2024-10-17

## 2024-10-15 RX ORDER — ASPIRIN 81 MG/1
81 TABLET ORAL DAILY
Status: DISCONTINUED | OUTPATIENT
Start: 2024-10-15 | End: 2024-10-17

## 2024-10-15 RX ORDER — PHENYLEPHRINE HCL IN 0.9% NACL 50MG/250ML
PLASTIC BAG, INJECTION (ML) INTRAVENOUS CONTINUOUS PRN
Status: DISCONTINUED | OUTPATIENT
Start: 2024-10-15 | End: 2024-10-17

## 2024-10-15 RX ORDER — ROCURONIUM BROMIDE 10 MG/ML
INJECTION, SOLUTION INTRAVENOUS AS NEEDED
Status: DISCONTINUED | OUTPATIENT
Start: 2024-10-15 | End: 2024-10-15 | Stop reason: SURG

## 2024-10-15 RX ORDER — METOCLOPRAMIDE HYDROCHLORIDE 5 MG/ML
5 INJECTION INTRAMUSCULAR; INTRAVENOUS EVERY 8 HOURS PRN
Status: DISCONTINUED | OUTPATIENT
Start: 2024-10-15 | End: 2024-10-17

## 2024-10-15 RX ORDER — GLYCOPYRROLATE 0.2 MG/ML
INJECTION, SOLUTION INTRAMUSCULAR; INTRAVENOUS AS NEEDED
Status: DISCONTINUED | OUTPATIENT
Start: 2024-10-15 | End: 2024-10-15 | Stop reason: SURG

## 2024-10-15 RX ORDER — ONDANSETRON 2 MG/ML
4 INJECTION INTRAMUSCULAR; INTRAVENOUS EVERY 6 HOURS PRN
Status: DISCONTINUED | OUTPATIENT
Start: 2024-10-15 | End: 2024-10-17

## 2024-10-15 RX ORDER — ACETAMINOPHEN 325 MG/1
650 TABLET ORAL EVERY 4 HOURS PRN
Status: DISCONTINUED | OUTPATIENT
Start: 2024-10-15 | End: 2024-10-17

## 2024-10-15 RX ORDER — SENNOSIDES 8.6 MG
17.2 TABLET ORAL NIGHTLY PRN
Status: DISCONTINUED | OUTPATIENT
Start: 2024-10-15 | End: 2024-10-17

## 2024-10-15 RX ORDER — PROTAMINE SULFATE 10 MG/ML
INJECTION, SOLUTION INTRAVENOUS AS NEEDED
Status: DISCONTINUED | OUTPATIENT
Start: 2024-10-15 | End: 2024-10-15 | Stop reason: SURG

## 2024-10-15 RX ORDER — SODIUM CHLORIDE, SODIUM LACTATE, POTASSIUM CHLORIDE, CALCIUM CHLORIDE 600; 310; 30; 20 MG/100ML; MG/100ML; MG/100ML; MG/100ML
INJECTION, SOLUTION INTRAVENOUS CONTINUOUS
Status: DISCONTINUED | OUTPATIENT
Start: 2024-10-15 | End: 2024-10-15

## 2024-10-15 RX ORDER — SODIUM CHLORIDE, SODIUM LACTATE, POTASSIUM CHLORIDE, CALCIUM CHLORIDE 600; 310; 30; 20 MG/100ML; MG/100ML; MG/100ML; MG/100ML
INJECTION, SOLUTION INTRAVENOUS CONTINUOUS
Status: DISCONTINUED | OUTPATIENT
Start: 2024-10-15 | End: 2024-10-16

## 2024-10-15 RX ORDER — SODIUM CHLORIDE 9 MG/ML
INJECTION, SOLUTION INTRAVENOUS CONTINUOUS PRN
Status: DISCONTINUED | OUTPATIENT
Start: 2024-10-15 | End: 2024-10-15 | Stop reason: SURG

## 2024-10-15 RX ORDER — IODIXANOL 320 MG/ML
100 INJECTION, SOLUTION INTRAVASCULAR
Status: COMPLETED | OUTPATIENT
Start: 2024-10-15 | End: 2024-10-15

## 2024-10-15 RX ADMIN — ROCURONIUM BROMIDE 50 MG: 10 INJECTION, SOLUTION INTRAVENOUS at 07:49:00

## 2024-10-15 RX ADMIN — PHENYLEPHRINE HCL 100 MCG: 10 MG/ML VIAL (ML) INJECTION at 07:57:00

## 2024-10-15 RX ADMIN — LIDOCAINE HYDROCHLORIDE 50 MG: 10 INJECTION, SOLUTION EPIDURAL; INFILTRATION; INTRACAUDAL; PERINEURAL at 07:49:00

## 2024-10-15 RX ADMIN — HEPARIN SODIUM 1000 UNITS: 1000 INJECTION, SOLUTION INTRAVENOUS; SUBCUTANEOUS at 09:19:00

## 2024-10-15 RX ADMIN — PROTAMINE SULFATE 30 MG: 10 INJECTION, SOLUTION INTRAVENOUS at 09:34:00

## 2024-10-15 RX ADMIN — PHENYLEPHRINE HCL 100 MCG: 10 MG/ML VIAL (ML) INJECTION at 09:17:00

## 2024-10-15 RX ADMIN — HEPARIN SODIUM 8000 UNITS: 1000 INJECTION, SOLUTION INTRAVENOUS; SUBCUTANEOUS at 08:34:00

## 2024-10-15 RX ADMIN — DEXAMETHASONE SODIUM PHOSPHATE 4 MG: 4 MG/ML VIAL (ML) INJECTION at 07:49:00

## 2024-10-15 RX ADMIN — ONDANSETRON 4 MG: 2 INJECTION INTRAMUSCULAR; INTRAVENOUS at 09:39:00

## 2024-10-15 RX ADMIN — PHENYLEPHRINE HCL 100 MCG: 10 MG/ML VIAL (ML) INJECTION at 08:40:00

## 2024-10-15 RX ADMIN — PHENYLEPHRINE HCL 100 MCG: 10 MG/ML VIAL (ML) INJECTION at 09:10:00

## 2024-10-15 RX ADMIN — GLYCOPYRROLATE 0.2 MG: 0.2 INJECTION, SOLUTION INTRAMUSCULAR; INTRAVENOUS at 08:34:00

## 2024-10-15 RX ADMIN — PHENYLEPHRINE HCL 100 MCG: 10 MG/ML VIAL (ML) INJECTION at 09:03:00

## 2024-10-15 RX ADMIN — ROCURONIUM BROMIDE 10 MG: 10 INJECTION, SOLUTION INTRAVENOUS at 08:35:00

## 2024-10-15 RX ADMIN — PHENYLEPHRINE HCL 100 MCG: 10 MG/ML VIAL (ML) INJECTION at 08:57:00

## 2024-10-15 RX ADMIN — SODIUM CHLORIDE: 9 INJECTION, SOLUTION INTRAVENOUS at 07:41:00

## 2024-10-15 NOTE — H&P
[facility]    PATIENT'S NAME: DA BISHOP   ATTENDING PHYSICIAN: Da Jones M.D.   PATIENT ACCOUNT#:   [id number]    LOCATION:  [address]  MEDICAL RECORD #:   [id number]       YOB: 1945  ADMISSION DATE:       10/15/2024    HISTORY AND PHYSICAL EXAMINATION    HISTORY OF PRESENT ILLNESS:  An 78-year-old white male admitted with a high-grade right internal carotid artery stenosis after undergoing radiation therapy for a throat cancer years ago.  The patient had been followed at [facility] back in .  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  The patient is still active, but he lost weight.  He is about 120 pounds.  He has thyroid disease from radiation also.  He is being followed by Dr. Colette Steele and Dr. Heath Chamberlain.     PAST MEDICAL HISTORY:  He has a past history of open-heart surgery with valve replacement by Dr. Miller of the mitral valve.  He also had a feeding tube because of difficulty with swallowing.     MEDICATIONS:  He is on levothyroxine, apixaban 5 mg b.i.d., simvastatin, prednisolone.  The apixaban was stopped.  He has been loaded with Plavix as well as taking baby aspirin.    ALLERGIES:  He has no known allergies, although Compazine is listed in his chart.    FAMILY HISTORY:  Mother  from Alzheimer's.  Father  from myocardial infarction.      SOCIAL HISTORY:  He had a history of smoking but quit in .  He is retired.  He is used to be an .  He is  with no children.    REVIEW OF SYSTEMS:  No recent cerebrovascular symptoms.  No recent chest pain, shortness of breath.  No gangrene or tissue loss in lower legs, no ulceration.  No hematuria, dysuria, hemoptysis, cough, sputum production.  No weight loss.  No fever, chills.  No hematemesis.  No bright red blood per rectum.      PHYSICAL EXAMINATION:    GENERAL:   He is awake, alert.  He is appropriate.  No acute distress.  VITAL SIGNS:  Blood pressure 136/70, heart rate 72, respirations 20, pulse slightly regular.  HEENT:  Pupils equal, round.  Sclerae clear.  Mouth without lesions.  LUNGS:  Clear to auscultation.  No rales or rhonchi.  HEART:  Normal.  No murmur.  ABDOMEN:  Soft.  No tenderness or masses.   EXTREMITIES:  Femoral pulses are palpable.  Popliteal and pedal pulses are palpable, maybe slightly diminished.  Upper extremity pulses are palpable.  NEUROLOGIC:  Moving all 4 extremities.      CT angiogram revealed a high-grade ulcerated lesion of the carotid bifurcation.  Main carotid seemed to be normal.  He has some skin thickening slightly in the neck itself, but the base of the neck seems to be adequate for an arteriotomy and access.    Ultrasound reviewed.     ASSESSMENT AND PLAN:  The patient had a P2Y12 done and it shows a level of 221.  Because of that, I placed him on Brilinta 90 mg b.i.d. with baby aspirin, and he is still taking a statin medication.  I told the patient continue with his Brilinta, his aspirin, and statin medication.  On physical examination, he is moving all 4 extremities as previously stated.  Aware of the risks and complications of this procedure including death, stroke, myocardial infarction, bleeding, infection, cranial nerve injury, renal failure, multisystem organ failure, wound healing, distal embolization, dissection of the artery.  Risks and complications of not doing procedure were explained.  Open repair versus femoral artery approach with a stent was also explained.  The patient understands and agrees.  All questions answered.  Discussed with Dr. Brooks, would be available for the procedure.  All questions answered.    Dictated By Wang Jones M.D.  d: 10/14/2024 16:25:41  t: 10/14/2024 18:02:13  Job [id number]  JJW/

## 2024-10-15 NOTE — PROCEDURES
[facility]    PATIENT'S NAME: DA BISHOP   ATTENDING PHYSICIAN: Da Jones M.D.   OPERATING PHYSICIAN: Itz Brooks M.D.   PATIENT ACCOUNT#:   [id number]    LOCATION:  [address] [place] [address]  MEDICAL RECORD #:   [id number]       YOB: 1945  ADMISSION DATE:       10/15/2024      OPERATION DATE:  10/15/2024    CARDIAC PROCEDURE TRANSCRIPTION      TRANSCAROTID ARTERY REVASCULARIZATION     PREOPERATIVE DIAGNOSIS:    1.   Severe right carotid stenosis.  2.   History of radiation therapy of the right neck.    POSTOPERATIVE DIAGNOSIS:    PROCEDURE PERFORMED:  Transcarotid artery revascularization.      OPERATORS:  Da Jones MD; Itz Brooks MD.    DESCRIPTION OF PROCEDURE:  After informed consent was obtained, patient was prepped and draped in the usual sterile fashion in the hybrid operating room.  Please see Dr. Jones's note for dissection of the right carotid area and placement of venous sheath in the left common femoral vein.  Briefly, Dr. Jones exposed the patient's right carotid.  Access was obtained using ultrasound in the left common femoral vein with placement of an 8-Georgian reversal sheath.  Heparin was given to maintain ACT over 250 seconds.  Next, under direct visualization, Dr. Jones entered the carotid artery using a micropuncture technique.  The wire was placed at the common carotid artery.  Per protocol, the micropuncture sheath was placed, and imaging was performed.  This revealed no significant disease at the bifurcation; yet, approximately 10 mm above, there was a 90% stenosis which appear ulcerated, followed by another ulcerated plaque just beyond that in the internal carotid.  Next, using fluoroscopic guidance and angiographic guidance, the wire was placed into the external carotid.  Sheath was advanced over this.  An 0.035 wire was left in place in the external carotid and the micropuncture sheath removed.  The TCAR sheath was placed without difficulty and  appeared to track nicely.  This was secured in place by Dr. Jones, and reversal of flow was initiated.  Repeat angiography was performed which revealed that the sheath was in adequate position in oblique views.  Next, a Runthrough wire was used under reversal to carefully traverse the lesions in the internal carotid, and it was placed into the distal internal carotid.  Next, a 5 x 40 mm balloon was used to perform balloon angioplasty.  Once this was done, it appeared that we would need 2 stents.  Therefore, we placed a 7 mm tapering up to a 9 mm from the distal portion of the lesion just at the bifurcation of the internal carotid.  The sheath appeared to respond well and expanded nicely.  Next, we placed a 30 mm stent in an overlapping fashion into the common carotid.  Reversal was initiated per protocol for over 3 minutes.  Subsequent angiography revealed good placement of the stent with excellent flow through the internal carotid with no significant residual stenosis.  The procedure was terminated.  The wire was carefully removed, and the sheath was removed from the carotid by Dr. Jones.  Manual compression will be held to achieve hemostasis in the left common femoral vein site.  There were no apparent acute complications noted, and the patient tolerated the procedure well.    SUMMARY:  In summary, the patient today underwent a TCAR procedure using 2 stents as described above.    Dictated By Itz Brooks M.D.  d: 10/15/2024 09:33:12  t: 10/15/2024 11:59:04  [facility] [id number]  AR/

## 2024-10-15 NOTE — ANESTHESIA PROCEDURE NOTES
Peripheral IV  Date/Time: 10/15/2024 8:16 AM  Inserted by: Aashish Acharya MD    Placement  Needle size: 18 G  Laterality: left  Location: antecubital  Local anesthetic: none  Site prep: chlorhexidine  Technique: ultrasound guided  Attempts: 1

## 2024-10-15 NOTE — DIETARY NOTE
[facility]   part of [facility]    NUTRITION ASSESSMENT    Pt does not meet malnutrition criteria at this time.      NUTRITION INTERVENTION:    Enteral Nutrition - Via G Tube Recommend starting  Boost Very high calorie  5 cartons/ day  This will provide 2650 kcal, 110 grams protein, 794 mL total free water, and 100% of RDI's.   Recommend additional 50oz free water daily to provide a total of 2294ml free h20  OK to resume pt's home regimen at discharge      PATIENT STATUS: 10/15/24 78/M admitted for trans carotid revascularization.  POD #0. RD received consult for TF.  Pt lying in bed, family at bedside providing all information.  Family reports that pt takes all nutrition via PEG.  Pt takes 5 cartons of Boost Very high Calorie daily,  sometimes less if pt not feeling well.  No n/v/d typically.  No wt loss per family.  Pt follows with RD at OSH, who reports they are happy with pts current regimen.  Pt is very active normally.  Family brought in all supplies for TF, and are comfortable resuming Pt's home routine.  All questions answered at time of visit.      ANTHROPOMETRICS:  Ht: 177.8 cm (5' 10\")  Wt: 57.6 kg (127 lb).   BMI: Body mass index is 18.22 kg/m².  IBW: 75.5  kg      WEIGHT HISTORY:   Weight loss: No    Wt Readings from Last 10 Encounters:   10/15/24 57.6 kg (127 lb)   04/06/22 64.4 kg (142 lb)   03/23/22 65.8 kg (145 lb)   12/06/20 61.8 kg (136 lb 3.9 oz)   11/13/19 61.3 kg (135 lb 2.3 oz)   11/12/19 65.8 kg (145 lb)   08/08/19 64.6 kg (142 lb 6.7 oz)   01/29/19 68 kg (150 lb)   01/10/19 65.8 kg (145 lb)   02/01/18 68 kg (150 lb)        NUTRITION:  Diet: No orders of the defined types were placed in this encounter.     Food Allergies: No  Cultural/Ethnic/Shinto Preferences Addressed: Yes    Percent Meals Eaten (last 3 days)       None            GI system review: WNL   Last BM PTA  Skin and wounds: intact    NUTRITION RELATED PHYSICAL FINDINGS:     1. Body Fat/Muscle Mass: unable to assess      2. Fluid Accumulation: none per RN documentation    NUTRITION PRESCRIPTION:  57.6 kg Actual Body Weight  Calories: 9415-6206 calories/day (30-40 kcal/kg)  Protein: 58-86 grams protein/day (1.0-1.5 grams protein per kg)  Fluid: ~1 ml/kcal or per MD discretion    NUTRITION DIAGNOSIS/PROBLEM:  Inadequate oral intake related to inability to take or tolerate as evidenced by need for NPO status      MONITOR AND EVALUATE/NUTRITION GOALS:  Weight stable within 1 to 2 lbs during admission - New  Tolerate alternative nutrition at 100% of goal - New      MEDICATIONS:  Abx, LRS @ 100; Dung    LABS:  Na 134    Pt is at Moderate nutrition risk    Stephania Myers RD, LDN, CNSC  Clinical Dietitian  Phone [phone]

## 2024-10-15 NOTE — ANESTHESIA PROCEDURE NOTES
Arterial Line    Date/Time: 10/15/2024 8:00 AM    Performed by: Aashish Acharya MD  Authorized by: Aashish Acharya MD    General Information and Staff    Procedure Start:  10/15/2024 8:00 AM  Procedure End:  10/15/2024 8:00 AM  Anesthesiologist:  Aashish Acharya MD  Performed By:  Anesthesiologist  Patient Location:  OR  Indication: continuous blood pressure monitoring and blood sampling needed    Site Identification: real time ultrasound guided and image stored and retrievable    Preanesthetic Checklist: 2 patient identifiers, IV checked, risks and benefits discussed, monitors and equipment checked, pre-op evaluation, timeout performed, anesthesia consent and sterile technique used    Procedure Details    Catheter Size:  20 G  Catheter Length:  1 and 3/4 inch  Catheter Type:  Arrow  Seldinger Technique?: Yes    Laterality:  Right  Site:  Radial artery  Site Prep: chlorhexidine    Line Secured:  Wrist Brace, tape and Tegaderm    Assessment    Events: patient tolerated procedure well with no complications      Medications  10/15/2024 8:00 AM      Additional Comments

## 2024-10-15 NOTE — ANESTHESIA PREPROCEDURE EVALUATION
PRE-OP EVALUATION    Patient Name: Wang Romero    Admit Diagnosis: RIGHT CAROTID STENOSIS    Pre-op Diagnosis: RIGHT CAROTID STENOSIS    RIGHT TRANSCAROTID ARTERY REVASCULARIZATION    Anesthesia Procedure: RIGHT TRANSCAROTID ARTERY REVASCULARIZATION (Right)    Surgeons and Role:     * Wang Jones MD - Primary    Pre-op vitals reviewed.  Temp: 96.6 °F (35.9 °C)  Pulse: 104  Resp: 23  BP: 113/88  SpO2: 97 %  Body mass index is 18.22 kg/m².    Current medications reviewed.  Hospital Medications:   iodixanol (VISIPAQUE) 320 MG/ML injection 100 mL  100 mL Injection ONCE PRN       Outpatient Medications:   Prescriptions Prior to Admission[1]    Allergies: Compazine [prochlorperazine]      Anesthesia Evaluation    Patient summary reviewed.    Anesthetic Complications  (-) history of anesthetic complications         GI/Hepatic/Renal                                 Cardiovascular      ECG reviewed.                         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 Reviewed records-no history discernible of airway manipulation s/p radiation-will proceed with glidescope. Reviewed Dr. Chang note with nasal endoscopy        Past Surgical History:   Procedure Laterality Date    Cabg      Cardiac valve surgery      Other surgical history      sqaumos cell cancer removed    Valve replacement      pig valve     Social History     Socioeconomic History    Marital status:    Tobacco Use    Smoking status: Former     Current packs/day: 0.00     Types: Cigarettes     Quit date: 2006     Years since quittin.1    Smokeless tobacco: Never   Vaping Use    Vaping status: Never Used   Substance and Sexual Activity    Alcohol  use: Yes     Comment: socially    Drug use: No     History   Drug Use No     Available pre-op labs reviewed.  Lab Results   Component Value Date    WBC 4.3 10/11/2024    RBC 3.92 10/11/2024    HGB 13.3 10/11/2024    HCT 38.2 (L) 10/11/2024    MCV 97.4 10/11/2024    MCH 33.9 10/11/2024    MCHC 34.8 10/11/2024    RDW 13.0 10/11/2024    .0 (L) 10/11/2024     Lab Results   Component Value Date     (L) 10/11/2024    K 4.0 10/11/2024    CL 97 (L) 10/11/2024    CO2 31.0 10/11/2024    BUN 29 (H) 10/11/2024    CREATSERUM 1.02 10/11/2024     (H) 10/11/2024    CA 9.9 10/11/2024     Lab Results   Component Value Date    INR 1.41 (H) 10/11/2024         Airway      Mallampati: III  Mouth opening: 3 FB  TM distance: 4 - 6 cm   Cardiovascular             Dental      Dental appliance(s): upper dentures and lower dentures       Pulmonary                     Other findings              ASA: 4   Plan: general  NPO status verified and     Post-procedure pain management plan discussed with surgeon and patient.    Comment: GETA/LMA discussed in detail.  Risk of complications discussed including but not limited to sore throat, cough, PONV discussed. Also, discussed risks including dental injury particularly on any weakened, treated or diseased teeth & pt wishes to proceed  All questions answered.    Plan/risks discussed with: patient  Use of blood product(s) discussed with: patient    Consented to blood products.          Present on Admission:  **None**             [1]   Medications Prior to Admission   Medication Sig Dispense Refill Last Dose/Taking    ticagrelor 90 MG Oral Tab Take 1 tablet (90 mg total) by mouth every 12 (twelve) hours.   10/15/2024    aspirin 81 MG Oral Tab EC Take 1 tablet (81 mg total) by mouth daily.   10/15/2024 Morning    simvastatin 20 MG Oral Tab Take 1 tablet (20 mg total) by mouth nightly.   10/15/2024 Morning    apixaban 5 MG Oral Tab 1 tablet (5 mg total) by Per G Tube route 2 (two) times  daily. 60 tablet 3 10/13/2024    Levothyroxine Sodium 50 MCG Oral Tab 1 tablet (50 mcg total) by Per G Tube route before breakfast.  0 10/13/2024

## 2024-10-15 NOTE — ANESTHESIA PROCEDURE NOTES
Airway  Date/Time: 10/15/2024 7:49 AM  Urgency: elective      General Information and Staff    Patient location during procedure: OR  Anesthesiologist: Aashish Acharya MD  Performed: anesthesiologist   Performed by: Aashish Acharya MD  Authorized by: Aashish Acharya MD      Indications and Patient Condition  Indications for airway management: anesthesia  Sedation level: deep  Preoxygenated: yes  Patient position: sniffing  Mask difficulty assessment: 1 - vent by mask    Final Airway Details  Final airway type: endotracheal airway      Successful airway: ETT  Cuffed: yes   Successful intubation technique: Video laryngoscopy  Endotracheal tube insertion site: oral  Blade size: #3  ETT size (mm): 7.5    Placement verified by: capnometry   Measured from: lips  ETT to lips (cm): 22  Number of attempts at approach: 1    Additional Comments  Atraumatic, easy mask, glidescope used due to history of throat radiation, easy glidescope and easy passage

## 2024-10-15 NOTE — ANESTHESIA POSTPROCEDURE EVALUATION
[facility]    Wang Romero Patient Status:  Inpatient   Age/Gender 78 year old male MRN [id number]   Location [facility] 6NE-A Attending Wang Jones MD   Hosp Day # 0 PCP Colette Steele MD       Anesthesia Post-op Note    RIGHT TRANSCAROTID ARTERY REVASCULARIZATION 9-7MM X 40 MM, 9MM X 30 MM    Procedure Summary       Date: 10/15/24 Room / Location: [facility] 03 HYBRID /  CVOR    Anesthesia Start: 0741 Anesthesia Stop: 1025    Procedure: RIGHT TRANSCAROTID ARTERY REVASCULARIZATION 9-7MM X 40 MM, 9MM X 30 MM (Right: Neck) Diagnosis: (RIGHT CAROTID STENOSIS)    Surgeons: Wang Jones MD Anesthesiologist: Aashish Acharya MD    Anesthesia Type: general ASA Status: 4            Anesthesia Type: general    Vitals Value Taken Time   /72 10/15/24 1019   Temp 97 10/15/24 1025   Pulse 102 10/15/24 1025   Resp 19 10/15/24 1025   SpO2 93 % 10/15/24 1025   Vitals shown include unfiled device data.    Patient Location: ICU    Anesthesia Type: general    Airway Patency: patent    Postop Pain Control: adequate    Mental Status: mildly sedated but able to meaningfully participate in the post-anesthesia evaluation    Nausea/Vomiting: none    Cardiopulmonary/Hydration status: stable euvolemic    Complications: no apparent anesthesia related complications    Postop vital signs: stable    Dental Exam: Unchanged from Preop    Sign out given to ICU staff.

## 2024-10-15 NOTE — CONSULTS
[facility]  Report of Consultation    Wang Romero Patient Status:  Inpatient    1945 MRN [id number]   [facility] 6NE-A Attending Wang Jones MD   Hosp Day # 0 PCP Colette Steele MD     Reason for Consultation:  Postoperative management of atrial fibrillation and valve replacement    History of Present Illness:  Wang Romero is a a(n) 78 year old male with history of permanent atrial fibrillation, aortic valve replacement, mitral valve ring placement who was brought in today electively by Dr. Jones for a TCAR procedure of the right carotid.    Postoperatively, patient appears awake and alert without any complaints.  He appears neurologically intact.  Denies any chest pain or shortness of breath.    History:  Past Medical History:    Arrhythmia    Cancer (HCC)    sqaumos cell     Cataract    Exposure to medical diagnostic radiation    Heart disease    Heart valve disease    Valve replaced in     High cholesterol    Hypothyroidism    Prostate enlargement    Rheumatic fever    Squamous cell carcinoma     Past Surgical History:   Procedure Laterality Date    Cabg      Cardiac valve surgery      Other surgical history      sqaumos cell cancer removed    Valve replacement      pig valve     No family history on file.   reports that he quit smoking about 18 years ago. His smoking use included cigarettes. He has never used smokeless tobacco. He reports current alcohol use. He reports that he does not use drugs.    Allergies:  Allergies[1]    Medications:    Current Facility-Administered Medications:     lactated ringers IV bolus 500 mL, 500 mL, Intravenous, Once PRN    atropine 0.1 MG/ML injection 0.5 mg, 0.5 mg, Intravenous, PRN    naloxone (Narcan) 0.4 MG/ML injection 0.08 mg, 0.08 mg, Intravenous, PRN    fentaNYL (Sublimaze) 50 mcg/mL injection 25 mcg, 25 mcg, Intravenous, Q5 Min PRN **OR** fentaNYL (Sublimaze) 50 mcg/mL injection 50 mcg, 50 mcg, Intravenous, Q5 Min PRN     HYDROmorphone (Dilaudid) 1 MG/ML injection 0.2 mg, 0.2 mg, Intravenous, Q5 Min PRN **OR** HYDROmorphone (Dilaudid) 1 MG/ML injection 0.4 mg, 0.4 mg, Intravenous, Q5 Min PRN **OR** HYDROmorphone (Dilaudid) 1 MG/ML injection 0.6 mg, 0.6 mg, Intravenous, Q5 Min PRN    acetaminophen (Tylenol) tab 650 mg, 650 mg, Oral, Q4H PRN **OR** HYDROcodone-acetaminophen (Norco) 5-325 MG per tab 1 tablet, 1 tablet, Oral, Q4H PRN **OR** HYDROcodone-acetaminophen (Norco) 5-325 MG per tab 2 tablet, 2 tablet, Oral, Q4H PRN    ondansetron (Zofran) 4 MG/2ML injection 4 mg, 4 mg, Intravenous, Q6H PRN    metoclopramide (Reglan) 5 mg/mL injection 5 mg, 5 mg, Intravenous, Q8H PRN    lactated ringers infusion, , Intravenous, Continuous    nitroGLYCERIN in dextrose 5% 50 mg/250mL infusion premix, 5-400 mcg/min, Intravenous, Titrated    phenylephrine in sodium chloride 0.9% (Dung-Synephrine) 50 mg/250mL infusion premix,  mcg/min, Intravenous, Continuous PRN    aspirin DR tab 81 mg, 81 mg, Oral, Daily    polyethylene glycol (PEG 3350) (Miralax) 17 g oral packet 17 g, 17 g, Oral, Daily PRN    sennosides (Senokot) tab 17.2 mg, 17.2 mg, Oral, Nightly PRN    bisacodyl (Dulcolax) 10 MG rectal suppository 10 mg, 10 mg, Rectal, Daily PRN    fleet enema (Fleet) rectal enema 133 mL, 1 enema, Rectal, Once PRN    ceFAZolin (Ancef) 2g in 10mL IV syringe premix, 2 g, Intravenous, Q8H    ticagrelor (Brilinta) tab 90 mg, 90 mg, Oral, Q12H    atorvastatin (Lipitor) tab 20 mg, 20 mg, Oral, Nightly    levothyroxine (Synthroid) tab 50 mcg, 50 mcg, Oral, Before breakfast    influenza virus trivalent high dose PF (Fluzone HD) 0.5 mL injection (ages >/= 65 years) 0.5 mL, 0.5 mL, Intramuscular, Prior to discharge    Review of Systems:  A comprehensive 10 point review of systems was completed.  Pertinent positives and negatives noted in the the HPI.     Physical Exam:  Blood pressure 110/65, pulse 88, temperature 96.9 °F (36.1 °C), temperature source Temporal,  resp. rate 21, height 5' 10\" (1.778 m), weight 127 lb (57.6 kg), SpO2 93%.  Temp (24hrs), Av.8 °F (36 °C), Min:96.6 °F (35.9 °C), Max:96.9 °F (36.1 °C)    Wt Readings from Last 3 Encounters:   10/15/24 127 lb (57.6 kg)   22 142 lb (64.4 kg)   22 145 lb (65.8 kg)       Telemetry: Atrial fibrillation  General: Alert and oriented in no apparent distress.  HEENT: EOMI, no scleral icterus, mucosa moist  Neck: Supple, carotids 2+   Cardiac: Irregularly irregular, 1-2/6 systolic murmur  Lungs: Clear   Abdomen: Soft, non-tender.   Extremities: Without clubbing, cyanosis or edema  Neurologic: Alert and oriented, normal affect.  Skin: Warm and dry.     Laboratories and Data:  Diagnostics:    Labs:   No results for input(s): \"TROP\", \"CK\" in the last 168 hours.   Recent Labs   Lab 10/11/24  1328 10/15/24  1157   RBC 3.92 2.73*   HGB 13.3 9.5*   HCT 38.2* 27.0*   MCV 97.4 98.9   MCH 33.9 34.8*   MCHC 34.8 35.2   RDW 13.0 12.7   NEPRELIM 3.33  --    WBC 4.3 8.4   .0* 140.0*     Recent Labs   Lab 10/11/24  1328   *   BUN 29*   CREATSERUM 1.02   CA 9.9   ALB 4.5   *   K 4.0   CL 97*   CO2 31.0   ALKPHO 99   AST 24   ALT 16   BILT 1.8*   TP 7.7      Lab Results   Component Value Date    PT 22.0 (H) 2013    PT 19.0 (H) 2013    PT 25.0 (H) 2013    INR 1.41 (H) 10/11/2024    INR 1.28 (H) 2020    INR 1.20 (H) 2019        Assessment/Plan:    Status post TCAR procedure of the right carotid.  Patient required 2 stent with an excellent angiographic result.  Appears neurologically intact.  Patient was started on Brilinta and is on baby aspirin.  He also is on Eliquis for his atrial fibrillation.  With triple therapy, with his body habitus, I think the patient is at elevated risk for bleeding issues.  Ideally, we can send him home on Eliquis and Brilinta.  I did speak to the , Dr. Sommer of [facility] regarding this.  Permanent atrial fibrillation  -heart rates are well-controlled.  His [id number]-ROCg score is 3.  Based on how he is doing, hopefully we can start Eliquis in the next 24 to 48 hours  History of aortic valve replacement and mitral valve repair with normal underlying LV function.  Patient's last echo was in December 2023.  This revealed a normally functioning SAVR.  Patient has mild to moderate mitral stenosis with a mean gradient of about 8 mmHg.  History of right neck radiation -patient has a G-tube in given issues with swallowing  Hyperlipidemia -statin    Discussed with Dr. Robert Brooks MD  10/15/2024  2:05 PM  C5         [1]   Allergies  Allergen Reactions    Compazine [Prochlorperazine] HALLUCINATION

## 2024-10-15 NOTE — OPERATIVE REPORT
Pre-op Dx: Right carotid stenosis/ radiation arteritis. Post-op : Same. . Procedure: Right TCAR9-7mmx 40 tapered [place] stent/ 9x30 [place] overlapping stent. 5x40mm balloon PTA. Co-surgeons: Robert/ Cecilia. EBL-100cc. Contrast 30cc. Afwrgv51.4min. DAP- [id number]

## 2024-10-16 LAB
ALBUMIN SERPL-MCNC: 3.5 G/DL (ref 3.2–4.8)
ALBUMIN/GLOB SERPL: 1.5 {RATIO} (ref 1–2)
ALP LIVER SERPL-CCNC: 72 U/L
ALT SERPL-CCNC: <7 U/L
ANION GAP SERPL CALC-SCNC: 8 MMOL/L (ref 0–18)
AST SERPL-CCNC: 22 U/L (ref ?–34)
BILIRUB SERPL-MCNC: 1 MG/DL (ref 0.2–1.1)
BLOOD TYPE BARCODE: 5100
BUN BLD-MCNC: 19 MG/DL (ref 9–23)
CALCIUM BLD-MCNC: 8.8 MG/DL (ref 8.7–10.4)
CHLORIDE SERPL-SCNC: 105 MMOL/L (ref 98–112)
CO2 SERPL-SCNC: 25 MMOL/L (ref 21–32)
CREAT BLD-MCNC: 0.89 MG/DL
EGFRCR SERPLBLD CKD-EPI 2021: 87 ML/MIN/1.73M2 (ref 60–?)
ERYTHROCYTE [DISTWIDTH] IN BLOOD BY AUTOMATED COUNT: 13 %
GLOBULIN PLAS-MCNC: 2.4 G/DL (ref 2–3.5)
GLUCOSE BLD-MCNC: 104 MG/DL (ref 70–99)
HCT VFR BLD AUTO: 25.6 %
HGB BLD-MCNC: 8.4 G/DL
HGB BLD-MCNC: 9 G/DL
MCH RBC QN AUTO: 34.6 PG (ref 26–34)
MCHC RBC AUTO-ENTMCNC: 35.2 G/DL (ref 31–37)
MCV RBC AUTO: 98.5 FL
OSMOLALITY SERPL CALC.SUM OF ELEC: 289 MOSM/KG (ref 275–295)
PLATELET # BLD AUTO: 128 10(3)UL (ref 150–450)
POTASSIUM SERPL-SCNC: 4.2 MMOL/L (ref 3.5–5.1)
PROT SERPL-MCNC: 5.9 G/DL (ref 5.7–8.2)
RBC # BLD AUTO: 2.6 X10(6)UL
SODIUM SERPL-SCNC: 138 MMOL/L (ref 136–145)
UNIT VOLUME: 350 ML
WBC # BLD AUTO: 10.9 X10(3) UL (ref 4–11)

## 2024-10-16 PROCEDURE — 99233 SBSQ HOSP IP/OBS HIGH 50: CPT | Performed by: HOSPITALIST

## 2024-10-16 RX ORDER — DIGOXIN 0.25 MG/ML
250 INJECTION INTRAMUSCULAR; INTRAVENOUS ONCE
Status: COMPLETED | OUTPATIENT
Start: 2024-10-16 | End: 2024-10-16

## 2024-10-16 NOTE — CONSULTS
[facility]  Department of Urology   Consultation Note    Wang Romero Patient Status:  Inpatient    1945 MRN [id number]   Location [facility] 6NE-A Attending Wang Jones MD   Hosp Day # 1 PCP Colette Steele MD     Reason for Consultation:  Urinary retention    History of Present Illness:  Wang Romero is a a(n) 78 year old male with PMH HLD, hypothryoid, R ICA stenosis, throat cancer who p/ for TCAR.  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 Presents 10/15/24 for TCAR.      Straight cath 450 mL > 700 mL > 750 mL > 400 mL     Urology was consulted.     Voiding ok prior to admission  No history of dysuria or hematuria  No history of urinary retention  Previous TURP  No prostate medication  No LE numbness  No saddle anesthesia  No constipation    History:  Past Medical History:    Arrhythmia    Cancer (HCC)    sqaumos cell     Cataract    Exposure to medical diagnostic radiation    Heart disease    Heart valve disease    Valve replaced in     High cholesterol    Hypothyroidism    Prostate enlargement    Rheumatic fever    Squamous cell carcinoma     Past Surgical History:   Procedure Laterality Date    Cabg      Cardiac valve surgery      Other surgical history      sqaumos cell cancer removed    Valve replacement      pig valve     No family history on file.   reports that he quit smoking about 18 years ago. His smoking use included cigarettes. He has never used smokeless tobacco. He reports current alcohol use. He reports that he does not use drugs.    Allergies:  Allergies[1]    Medications:    Current Facility-Administered Medications:     acetaminophen (Tylenol) tab 650 mg, 650 mg, Oral, Q4H PRN **OR** HYDROcodone-acetaminophen (Norco) 5-325 MG per tab 1 tablet, 1 tablet, Oral, Q4H PRN **OR** HYDROcodone-acetaminophen (Norco) 5-325 MG per tab 2 tablet, 2 tablet, Oral, Q4H PRN     ondansetron (Zofran) 4 MG/2ML injection 4 mg, 4 mg, Intravenous, Q6H PRN    metoclopramide (Reglan) 5 mg/mL injection 5 mg, 5 mg, Intravenous, Q8H PRN    nitroGLYCERIN in dextrose 5% 50 mg/250mL infusion premix, 5-400 mcg/min, Intravenous, Titrated    phenylephrine in sodium chloride 0.9% (Dung-Synephrine) 50 mg/250mL infusion premix,  mcg/min, Intravenous, Continuous PRN    aspirin DR tab 81 mg, 81 mg, Oral, Daily    polyethylene glycol (PEG 3350) (Miralax) 17 g oral packet 17 g, 17 g, Oral, Daily PRN    sennosides (Senokot) tab 17.2 mg, 17.2 mg, Oral, Nightly PRN    bisacodyl (Dulcolax) 10 MG rectal suppository 10 mg, 10 mg, Rectal, Daily PRN    fleet enema (Fleet) rectal enema 133 mL, 1 enema, Rectal, Once PRN    ticagrelor (Brilinta) tab 90 mg, 90 mg, Oral, Q12H    atorvastatin (Lipitor) tab 20 mg, 20 mg, Oral, Nightly    levothyroxine (Synthroid) tab 50 mcg, 50 mcg, Oral, Before breakfast    influenza virus trivalent high dose PF (Fluzone HD) 0.5 mL injection (ages >/= 65 years) 0.5 mL, 0.5 mL, Intramuscular, Prior to discharge    Review of Systems:  Pertinent items are noted in HPI.  10 point review of systems completed.  Physical Exam:  /78 (BP Location: Right arm)   Pulse 113   Temp 97.9 °F (36.6 °C) (Temporal)   Resp (!) 28   Ht 5' 10\" (1.778 m)   Wt 127 lb (57.6 kg)   SpO2 94%   BMI 18.22 kg/m²   GENERAL: the patient is sitting up in chair in no acute distress.    HEENT: Unremarkable.  NECK: Supple.   LUNGS: non-labored respirations  ABDOMEN: The abdomen is soft and nontender without rebound or guarding.   BACK: Without CVA tenderness  Moving all extremities    Laboratory Data:  Lab Results   Component Value Date    WBC 10.9 10/16/2024    HGB 9.0 10/16/2024    HCT 25.6 10/16/2024    .0 10/16/2024    CREATSERUM 0.89 10/16/2024    BUN 19 10/16/2024     10/16/2024    K 4.2 10/16/2024     10/16/2024    CO2 25.0 10/16/2024     10/16/2024    CA 8.8 10/16/2024    ALB  3.5 10/16/2024    ALKPHO 72 10/16/2024    BILT 1.0 10/16/2024    TP 5.9 10/16/2024    AST 22 10/16/2024    ALT <7 10/16/2024    PTT 28.5 10/15/2024          Imaging:   Impression:  Patient Active Problem List   Diagnosis    Dupuytren's contracture of both hands    Dupuytren's contracture of left hand    Community acquired pneumonia of left lung    Community acquired pneumonia of left lung, unspecified part of lung    Hemoptysis    Anticoagulated    Chronic atrial fibrillation (HCC)    Pure hypercholesterolemia    Acquired hypothyroidism    Syncope, near    Gastroenteritis    Elevated troponin    Community acquired pneumonia of left lower lobe of lung    Diarrhea    Vomiting    History of aortic valve replacement with bioprosthetic valve    Lytic bone lesions on xray    Elevated PSA    Subarachnoid hemorrhage (HCC)    Atrial fibrillation and flutter (HCC)    Subarachnoid hemorrhage following injury, no loss of consciousness (HCC)    Closed fracture of orbit (HCC)    Fall    Urinary tract infection without hematuria    Hypoxemia    Acute respiratory failure with hypoxia (HCC)    Community acquired pneumonia, unspecified laterality    Aspiration pneumonia (HCC)    Malnutrition (HCC)    Septic shock (HCC)    Gram-negative pneumonia (HCC)    Dysphagia    Stenosis of right carotid artery       URINARY RETENTION  -serum creat 0.89    Recommendations:  Would defer tamsulosin in light of low BP  Discussed options of CIC versus insertion of indwelling deluca catheter with future voiding trial if patient continues to have urinary retention.  Discussed risk of infection, hydronephrosis, worsening renal function, sepsis with urinary retention.  Patient will consider options of CIC vs indwelling deluca catheter in private if he continues to have urinary retention.    Avoid constipation    Will follow peripherally at this time.    Above discussed with patient, nurse.    Thank you for allowing me to participate in the care of your  patient.    Cuca Galan PA-C  [facility]  Department of Urology  10/16/2024  9:31 AM         [1]   Allergies  Allergen Reactions    Compazine [Prochlorperazine] HALLUCINATION

## 2024-10-16 NOTE — OPERATIVE REPORT
[facility]    PATIENT'S NAME: DA BISHOP   ATTENDING PHYSICIAN: Da Jones M.D.   OPERATING PHYSICIAN: Da Jones M.D.   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  PROCEDURE:  1.   Opened right common carotid artery at the base by Dr. Jones.  2.   Access of left common femoral vein with micropuncture technique and duplex ultrasound imaging and placement eventually of an 8-Guinean [address] femoral sheath by Dr. Jones.  3.   Micropuncture access of right common carotid artery with micropuncture wire and 0.014 wire under fluoroscopy imaging by Dr. Jones with eventual placement of an 0.014 wire into the external iliac artery with placement of the 4-Guinean sheath into the external iliac artery and subsequent placement of an 0.035 J-wire into the external iliac artery and an 8-[place] [address] arterial sheath by Dr. Jones.  4.   Angiogram of the carotid artery through the sheath at the bifurcation by Dr. Jones.  5.   An 0.014 Runthrough guidewire through the sheath with reversal of flow and control of the common carotid artery proximally by Dr. Brooks, with subsequent rapid exchange 5 x 40 mm balloon angioplasty of the stenosis under reverse flow, and then placement under reverse flow of a [address] tapered 9 x 7 mm by 40 mm stent, Enroute stent, with an overlap of a 9 x 30 mm Enroute stent by Dr. Brooks.  6.   Removal of the wire with reversal of flow and then closure of the right common carotid artery primarily  after removal of the sheath and flushing the artery by Dr. Jones.  7.   Removal of the left femoral sheath after given protamine with pressure applied for about 10 minutes by Dr. Jones.    CO-SURGEONS:  Wang Jones MD; and Itz Brooks MD.    ASSISTANT:  SUZANNE Gallagher.    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  He also has a valve replacement that is porcine.  He is on Eliquis for atrial fibrillation.  His Eliquis was held, and he was put on Brilinta due to a high P2Y12, 90 mg b.i.d., with a baby aspirin.  He has been taking this as well as his statin medication.  He was recommended for TCAR procedure.  He was asymptomatic, but he progressed in stenosis to at least 90% to 95% with an ulcerated lesion, going from at least 2 cm to 2.5 cm.  The risks and complications of death, myocardial infarction, bleeding, infection, stroke, cranial nerve injury, renal failure, multisystem organ failure, cardiac arrest, and recurrent stenosis were all explained to the patient.  The option of just continuing medical management versus a femoral approach for stent placement versus open repair was also discussed.  Patient agreed.  All questions answered.    OPERATIVE TECHNIQUE:  The patient was placed supine on procedure table on the date of the surgery, underwent general anesthesia.  Arterial lines started by Anesthesia, as well as IV fluids.  No Gaitan was given.  Ultrasound was done that showed that the common carotid artery was slightly deep to the jugular vein, more medial, and this came above the clavicle.  There was enough segment that it seemed to be adequate length for the intervention.  The patient had access distally through the right femoral vein and then through the left femoral vein with ultrasound imaging, and fluoroscopy with placement of a micropuncture needle and wire, with eventually a J-wire and a  5-Nigerien sheath dilating it and an 8-Nigerien [address] femoral sheath.  This was sutured to the skin.  The patient then, being fully anticoagulated with a 5-0 Prolene suture put as a U-stitch on the common carotid artery, had access with micropuncture technique with good backbleeding and placement of an 0.014 [address] wire, rotated within the artery to make sure it was in the artery with a 4-Nigerien sheath.  Angiogram was done with a significant COLUNGA view, showing the carotid bifurcation with a high-grade stenosis starting at the internal carotid artery and going upwards.    The patient then had the 0.014 [address] wire rotated to the external iliac artery and the sheath was advanced along this area, and then an 0.035 J-wire was placed through the sheath into the external iliac artery.  Then over this, a 4-Nigerien sheath was removed, was placed the 8-Nigerien [address] arterial sheath that was sutured to the skin with 2-0 nylon.  The patient had been fully anticoagulated at this time with ACTs up to 330 to 340 seconds.    The connecting device was placed between the sheath in the common carotid artery to the sheath in the femoral vein.  The patient had an angiogram just prior to this to make sure the sheath was in the proper position.    The patient had a TCAR time-out and then a vessel loop was pulled up on the common carotid artery for control proximally, and the procedure was done with reversal of flow, which lasted for 16 minutes.  The patient an 0.014 Runthrough guidewire placed through the area of the stenosis by Dr. Brooks into the internal carotid artery distally.  A 5 x 40 mm rapid exchange balloon was then done of the area of the stenosis, and this was done, followed by a 9 x 7 tapered by 40 mm Enroute stent with an overlap of a 9 x 30 mm Castellanos stent, bringing it down to the common carotid artery.  Repeat angiogram performed, still on reversal of flow after 3 minutes, showed that there was good apposition  of the stent, it was widely open, a little bit of spasm of the external carotid artery probably from the stent blocking the origin of the external carotid artery.  Vital signs remained stable throughout the procedure.  It should be noted the blood pressure was elevated up to 150 to 160 during the procedure.    After the wire was removed, the patient then had release of the proximal vessel loop on the carotid artery, and reversal of flow was kept for probably another 1/2 minute.  Then, the patient had disconnection of the connecting pieces between the carotid artery and femoral vein sheath with return of blood back to the patient.  The sheath was turned off in the femoral vein area, and then the patient had control of the carotid artery and the sheath was removed from the carotid artery with bleeding allowed to flush out any air or debris.  The patient then had this controlled with a few more 6-0 Prolene sutures with good hemostasis.  Flow was re-established to the head after the initial closure of the suture and an 18-gauge needle placed in to make sure there was no air in the carotid artery.  Hemostasis was obtained with Gelfoam and thrombin, Nu-Knit, and then FloSeal with protamine.  ACT was back to 140 at baseline at the end of the procedure, good flow through the area of the carotid artery.  Hemostasis was intact with the hemoclips and Bovie coagulation.  Once hemostasis was intact, the wounds were closed with 2-0 Vicryl and 3-0 Vicryl in multiple layers, then 3-0 Vicryl subcuticular.  The area was anesthetized with about 20 mL of 0.25% Marcaine plain.    The sheath in the left groin was removed from the femoral vein with pressure approximately about 10 minutes.  Attempt had been made to put it in the right femoral vein to begin with, and with micropuncture access inadvertently into the femoral artery.  Pressure was held on there for about 5 to 10 minutes, and it was seen to be clean and dry at the end of that  procedure.  Vital signs remained stable.  The patient was extubated and was awake and alert, talking, moving all 4 extremities.    Dictated By Wang Jones M.D.  d: 10/15/2024 10:32:00  t: 10/15/2024 19:08:12  [facility] [id number]  JJW/    cc: ANDRE Clarke M.D. Stanislaw J Skaluba, M.D.

## 2024-10-16 NOTE — PROGRESS NOTES
[facility]   part of [facility]     Cardiology Progress Note        Wang Romero Patient Status:  Inpatient    1945 MRN [id number]   Location [facility] 6NE-A Attending Wang Jones MD   Hosp Day # 1 PCP Colette Steele MD         Subjective/ROS:  Up in chair. Looks and feels well. Breathing comfortable. Does not feel lightheaded or dizzy. Remains on 70 mcg of evans for bp support however pt tells me his baseline sbp is only 100    Objective:  /60 (BP Location: Right arm)   Pulse 90   Temp 97.9 °F (36.6 °C) (Temporal)   Resp 23   Ht 5' 10\" (1.778 m)   Wt 127 lb (57.6 kg)   SpO2 93%   BMI 18.22 kg/m²     Temp (24hrs), Av.4 °F (36.3 °C), Min:96.9 °F (36.1 °C), Max:98 °F (36.7 °C)      Tele  Afib with hr ranging from     Intake/Output:    Intake/Output Summary (Last 24 hours) at 10/16/2024 0932  Last data filed at 10/16/2024 0823  Gross per 24 hour   Intake 2534 ml   Output 2300 ml   Net 234 ml       Patient Weight for the past 72 hrs:   Weight   10/15/24 0635 127 lb (57.6 kg)        Physical Exam:    Gen: alert, oriented x 3, NAD up iin chair   Heent: pupils equal, reactive. Mucous membranes moist.   Neck: no jvd. Right cea incision soft, intact  Cardiac: irregularly irregular normal S1,S2  Lungs: bibasilar crackles   Abd: soft, G tube in place   Ext: no edema  Skin: Warm, dry  Neuro: No focal deficits    Labs:  Lab Results   Component Value Date    WBC 10.9 10/16/2024    HGB 9.0 10/16/2024    HCT 25.6 10/16/2024    .0 10/16/2024    CREATSERUM 0.89 10/16/2024    BUN 19 10/16/2024     10/16/2024    K 4.2 10/16/2024     10/16/2024    CO2 25.0 10/16/2024     10/16/2024    CA 8.8 10/16/2024    ALB 3.5 10/16/2024    ALKPHO 72 10/16/2024    BILT 1.0 10/16/2024    TP 5.9 10/16/2024    AST 22 10/16/2024    ALT <7 10/16/2024    PTT 28.5 10/15/2024           Medications:     aspirin  81 mg Oral Daily    ticagrelor  90 mg Oral Q12H    atorvastatin  20  mg Oral Nightly    levothyroxine  50 mcg Oral Before breakfast      nitroGLYCERIN in dextrose 5%      phenylephrine 60 mcg/min (10/16/24 8030)       Assessment:    POD 1 right TCAR  Acute/chronic hypotension post tcar- not unexpected. Baseline sbp only ~ 100 mmHg  Permanent afib- rate control margina  Per last office note, should be on lopressor 12.5 mg bid however not on pta med list.  Historically on eliquis which is on hold  Dark stools- stool for occult blood ordered  Hx AVR, mitral valve repair 2008 with mild to moderate mitral stenosis  Hx nonobstructive cad  Hx head and neck ca 2010 treated with debulking, chemo- gtube for nutrition  Anemia, tcp- continue to follow trends     Plan:     Wean dung keeping in mind baseline sbp runs around 100 mmHg  Follow hgb, plts. Await stool for occult blood. Would like to resume eliquis as soon a safe to do so.  Appears pt was on asa, brillianta and eliquis prior to admission - will discuss with team which agent to dc to avoid triple therapy.   Will resume lopressor once of dung. Occasional sporadic jump in hr to 120s not concerning but will work to optimize as able     Discussed plan of care with patient and nursing.       aLura Hargrove, MARLENY  [phone]      Patient seen and examined    Patient without new complaints.  He appears neurologically intact.  A-fib rates are marginally controlled.  Systolic blood pressures have been on the lower side and patient is on low-dose dung-.  1 dose of digoxin has been given with heart rates now about 100 or so.  Patient is asymptomatic.  Hopefully we can wean off his Dung-Synephrine as this hemodynamics get back to baseline.  He will continue with DAPT.  He does have dark stools.  Ultimately, I suspect the patient should go home on Brilinta and Eliquis and discontinue his baby aspirin.    Itz Brooks MD [facility]  L3

## 2024-10-16 NOTE — PLAN OF CARE
Received patient from [facility] around 1020. Attempting to wean Dung gtt for SBP goal 100-150, see MAR . Patient A&O x4, neurologically intact. Straight cath x2, needs occult stool sample for reported black stools prior to hospital arrival. Patient not wanting to take his tube feeds during our shift. Bilateral groins intact. R neck incision with small serosanguineous drainage on dressing. Denies pain. Afib per tele. Family at bedside, questions answered.

## 2024-10-16 NOTE — PHYSICAL THERAPY NOTE
PHYSICAL THERAPY EVALUATION - INPATIENT     Room Number: 6617/6617-A  Evaluation Date: 10/16/2024  Type of Evaluation: Initial  Physician Order: PT Eval and Treat    Presenting Problem: s/p TCAR 10/15  Co-Morbidities : HLD, hypothryoid, R ICA stenosis, throat cancer  Reason for Therapy: Mobility Dysfunction and Discharge Planning    PHYSICAL THERAPY ASSESSMENT   Patient is a 78 year old male admitted 10/15/2024 for planned procedure s/p  TCAR 10/15.  Prior to admission, patient's baseline is indep.  Patient is currently functioning at baseline with transfers and gait.     Patient has met all skilled IPPT goals at this time. Patient will be discharged from Physical Therapy services.  Please re-order if a new functional limitation presents during this admission.      PLAN DURING HOSPITALIZATION  Nursing Mobility Recommendation : 1 Assist  PT Device Recommendation: None                HOME SITUATION  Type of Home: House  Home Layout: Two level                     Lives With: Spouse    Drives: Yes          Prior Level of [place]: indep, no hx of falls, walks 2 miles/day    SUBJECTIVE  \" My wife walks 5 miles/day\"     OBJECTIVE  Precautions: None  Fall Risk: Standard fall risk    WEIGHT BEARING RESTRICTION     PAIN ASSESSMENT  Rating: Unable to rate  Location: incision  Management Techniques: Activity promotion;Body mechanics;Repositioning    COGNITION  Overall Cognitive Status:  WFL - within functional limits    RANGE OF MOTION AND STRENGTH ASSESSMENT  Upper extremity ROM and strength are within functional limits     Lower extremity ROM is within functional limits     Lower extremity strength is within functional limits     BALANCE  Static Sitting: Good  Dynamic Sitting: Good  Static Standing: Good  Dynamic Standing: Good    ADDITIONAL TESTS                                    ACTIVITY TOLERANCE                         O2 WALK       NEUROLOGICAL FINDINGS                        AM-PAC '6-Clicks' INPATIENT SHORT FORM  - BASIC MOBILITY  How much difficulty does the patient currently have...  Patient Difficulty: Turning over in bed (including adjusting bedclothes, sheets and blankets)?: None   Patient Difficulty: Sitting down on and standing up from a chair with arms (e.g., wheelchair, bedside commode, etc.): None   Patient Difficulty: Moving from lying on back to sitting on the side of the bed?: None   How much help from another person does the patient currently need...   Help from Another: Moving to and from a bed to a chair (including a wheelchair)?: None   Help from Another: Need to walk in hospital room?: None   Help from Another: Climbing 3-5 steps with a railing?: None     AM-PAC Score:  Raw Score: 24   Approx Degree of Impairment: 0%   Standardized Score (AM-PAC Scale): 61.14   CMS Modifier (G-Code): CH    FUNCTIONAL ABILITY STATUS  Gait Assessment   Functional Mobility/Gait Assessment  Gait Assistance: Independent  Distance (ft): 300  Assistive Device: None  Pattern: Within Functional Limits    Skilled Therapy Provided     Bed Mobility:  Rolling: NT  Supine to sit: NT   Sit to supine: NT     Transfer Mobility:  Sit to stand: indep   Stand to sit: indep  Gait = indep    Therapist's Comments: Discussed case with RN prior to session initiation. Pt agreeable to participation in therapy. SBP remained  with activity via ART line, HR elevated 145 max noted on monitor - pt asymptomatic, O2 sats ~92 on RA. Educated on post op precautions.       Exercise/Education Provided:  Functional activity tolerated    Patient End of Session: Up in chair;With  staff;Needs met;Call light within reach;RN aware of session/findings;Hospital anti-slip socks      Patient Evaluation Complexity Level:  History Low - no personal factors and/or co-morbidities   Examination of body systems Low -  addressing 1-2 elements   Clinical Presentation Low- Stable   Clinical Decision Making Low Complexity       PT Session Time: 15 minutes  Gait Training:   minutes  Therapeutic Activity:  minutes  Neuromuscular Re-education:  minutes  Therapeutic Exercise:  minutes

## 2024-10-16 NOTE — PROGRESS NOTES
Assumed care at 0700. Weaned to RA on shift. Weaned off evans on shift. Continues with urinary retention on shift. Urology consulted and deluca placed. Stool sent for occult blood and + on shift. Mds updated on occult blood result. Recheck Hgb level sent per order from MD Jones. Up walking in halls and up to chair/ bathroom on shift x1 assist. Reports improvement of pain to right side neck on shift. Continues with home tube feed bolus per G tube. All meds per G tube. IV fluids stopped on shift. Updated on plan of care and condition update. All needs met on shift.    Please mail him a Zio patch.    Send him information on vasovagal syncope.    Follow-up in 6 weeks, virtual.

## 2024-10-16 NOTE — PLAN OF CARE
Assumed care of Wang at 1930 s/p TCAR during the day. NeoSynephrine infusing to keep SBP >100. He is alert/oriented x 4., RAYMUNDO x 4 & followng commands; no focal weakness 4/5 motor strength. Swallowing ok. Afib, controlled. Brilainta given as scheduled. L groin incsiion intact/ dermabonded open to air.   0630- No acute event overnight. Remain on Dung Synephrine to keep SBP >100. Up in chair.

## 2024-10-16 NOTE — DIETARY NOTE
Clinical Nutrition    RD received consult \"update/ review tube feeding orders with patient. patient refusing bolus feeds at this time \"    Per RN, pt refusing bolus feeds due to reflux like symptoms with bolus feeds.  These are normally tolerable, but pt with increased pain due to recent surgery.  Had refused ONS, but agreeable to water flushes.  When RD rounded, RN reported pt starting to take Bolus feeds.    If symptoms continue to be bothersome, affecting intake of bolus feeds, could consider continuous feeds at 55ml/hr (hold for synthroid).  For now, continue bolus feeds per home routine.  RD available for recs PRN.    See full assessment 10/15    Stephania Myers RD, LDN, CNSC  Clinical Dietitian  Phone [phone]

## 2024-10-16 NOTE — PROGRESS NOTES
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     Subjective:     Patient denies cp sob    Objective:    Review of Systems:   A comprehensive review of systems was completed; pertinent positive and negatives stated in subjective.    Vital signs:  Temp:  [97 °F (36.1 °C)-98 °F (36.7 °C)] 97.8 °F (36.6 °C)  Pulse:  [] 96  Resp:  [16-30] 30  BP: ()/(52-91) 120/69  SpO2:  [91 %-99 %] 91 %  AO: ()/() 125/60    Physical Exam:    General: No acute distress  Respiratory: No wheezes, no rhonchi  Cardiovascular: S1, S2, regular rate and rhythm  Abdomen: Soft, Non-tender, non-distended, positive bowel sounds  Neuro: No new focal deficits.   Extremities: No edema      Diagnostic Data:    Labs:  Recent Labs   Lab 10/11/24  1328 10/15/24  1157 10/16/24  0534   WBC 4.3 8.4 10.9   HGB 13.3 9.5* 9.0*   MCV 97.4 98.9 98.5   .0* 140.0* 128.0*   INR 1.41*  --   --        Recent Labs   Lab 10/11/24  1328 10/16/24  0534   * 104*   BUN 29* 19   CREATSERUM 1.02 0.89   CA 9.9 8.8   ALB 4.5 3.5   * 138   K 4.0 4.2   CL 97* 105   CO2 31.0 25.0   ALKPHO 99 72   AST 24 22   ALT 16 <7*   BILT 1.8* 1.0   TP 7.7 5.9       Estimated Creatinine Clearance: 53.9 mL/min (based on SCr of 0.89 mg/dL).    No results for input(s): \"TROP\", \"TROPHS\", \"CK\" in the last 168 hours.    Recent Labs   Lab 10/11/24  1328   PTP 17.4*   INR 1.41*                  Microbiology    No results found for this visit on 10/15/24.      Imaging: Reviewed in Epic.    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     #R carotid artery stenosis/radiation arteritis  -s/p TCAR w/ 2 stents  -vascular, cards  -DAPT and eliquis. Cards plans eliquis and brilinta at DC  -statin     #permanent afib  -eliquis in next 1-2d     #recent melena  -was on triple therapy.   -most recent was dark brown  -check hemoccult  -can hold off on GI for now since seemingly resolved  -monitor hgb     #hx AV replacement and MV repair     #hx throat cancer s/p radiation     #HLD     #hypothyroid      Bailee Jurado MD    Supplementary Documentation:     Quality:  DVT Mechanical Prophylaxis:   SCDs,    DVT Pharmacologic Prophylaxis   Medication   None         DVT Pharmacologic prophylaxis: Aspirin 81 mg      Code Status: Full Code  Gaitan: No urinary catheter in place  Gaitan Duration (in days):   Central line:    PRISCILA:     Discharge is dependent on: progress  At this point Mr. Romero is expected to be discharge to: home    The 21st Century Cures Act makes medical notes like these available to patients in the interest of transparency. Please be advised this is a medical document. Medical documents are intended to carry relevant information, facts as evident, and the clinical opinion of the practitioner. The medical note is intended as peer to peer communication and may appear blunt or direct. It is written in medical language and may contain abbreviations or verbiage that are unfamiliar.

## 2024-10-16 NOTE — PROGRESS NOTES
Up in chair- still on Dung/ BP between 100-140. Had drop in BP when sitting up. Needed straight cath times two last night. Neuro intact. Wound clean/dry. Hgb stable- continue Brillinta/ ASA 81mg- anti-coagulation/ rhythm control per Dr. Brooks. Urology/ cardiology/ nutrition follow up

## 2024-10-16 NOTE — CDS QUERY
Dear Doctor Raya:  Clinical information, provided below, indicates weight loss. BMI 18.22kg/m2. Can you please further clarify in the medical record the diagnosis associated with these findings:   (   ) Underweight    (   ) Other condition (please specify):      Documentation from the Medical Record:     78 y M w/PMH HLD, hypothyroid, R ICA stenosis, throat cancer who p/ for TCAR. 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     Clinical Indicators/Possible Risk:     History of throat cancer with dysphagia and G tube placement.   Height 5'10” Weight 127lb BMI 18.22    Treatment: monitor, dietitian consult                 Use of terms such as suspected, possible, or probable (associated with a specific diagnosis that is being evaluated, monitored, or treated as if it exists) are acceptable and can be coded in the inpatient setting, when documented at the time of discharge.     Please add any additional documentation to your progress note and continue to document this through discharge.      For questions regarding this query, please contact Clinical Documentation: YUDELKA Perez. Thank you.  THIS FORM IS A PERMANENT PART OF THE MEDICAL RECORD

## 2024-10-17 VITALS
SYSTOLIC BLOOD PRESSURE: 102 MMHG | HEIGHT: 70 IN | RESPIRATION RATE: 22 BRPM | BODY MASS INDEX: 19.26 KG/M2 | HEART RATE: 100 BPM | OXYGEN SATURATION: 98 % | DIASTOLIC BLOOD PRESSURE: 70 MMHG | TEMPERATURE: 98 F | WEIGHT: 134.5 LBS

## 2024-10-17 LAB
ERYTHROCYTE [DISTWIDTH] IN BLOOD BY AUTOMATED COUNT: 13.2 %
HCT VFR BLD AUTO: 25.4 %
HGB BLD-MCNC: 8.6 G/DL
MCH RBC QN AUTO: 33.7 PG (ref 26–34)
MCHC RBC AUTO-ENTMCNC: 33.9 G/DL (ref 31–37)
MCV RBC AUTO: 99.6 FL
PLATELET # BLD AUTO: 102 10(3)UL (ref 150–450)
PLATELETS.RETICULATED NFR BLD AUTO: 3.6 % (ref 0–7)
RBC # BLD AUTO: 2.55 X10(6)UL
WBC # BLD AUTO: 5.2 X10(3) UL (ref 4–11)

## 2024-10-17 NOTE — PROGRESS NOTES
[facility]   part of [facility]     Cardiology Progress Note        Wang Romero Patient Status:  Inpatient    1945 MRN [id number]   Location [facility] 6NE-A Attending Wang Jones MD   Hosp Day # 2 PCP Colette Steele MD         Subjective/ROS:  Up in chair. Off evans. Denies complaints and feels ready for dc    Objective:  /70   Pulse 100   Temp 97.5 °F (36.4 °C) (Temporal)   Resp 22   Ht 5' 10\" (1.778 m)   Wt 134 lb 7.7 oz (61 kg)   SpO2 98%   BMI 19.30 kg/m²     Temp (24hrs), Av.8 °F (36.6 °C), Min:97.3 °F (36.3 °C), Max:98.8 °F (37.1 °C)      Tele  Afib with hr ranging from     Intake/Output:    Intake/Output Summary (Last 24 hours) at 10/17/2024 1051  Last data filed at 10/17/2024 0630  Gross per 24 hour   Intake 1096 ml   Output 1100 ml   Net -4 ml       Patient Weight for the past 72 hrs:   Weight   10/15/24 0635 127 lb (57.6 kg)        Physical Exam:    Gen: alert, oriented x 3, NAD up iin chair   Heent: pupils equal, reactive. Mucous membranes moist.   Neck: no jvd. Right cea incision soft, intact  Cardiac: irregularly irregular normal S1,S2  Lungs: bibasilar crackles   Abd: soft, G tube in place   Ext: no edema  Skin: Warm, dry  Neuro: No focal deficits    Labs:  Lab Results   Component Value Date    WBC 5.2 10/17/2024    HGB 8.6 10/17/2024    HCT 25.4 10/17/2024    .0 10/17/2024           Medications:     aspirin  81 mg Oral Daily    ticagrelor  90 mg Oral Q12H    atorvastatin  20 mg Oral Nightly    levothyroxine  50 mcg Oral Before breakfast      nitroGLYCERIN in dextrose 5%      phenylephrine Stopped (10/16/24 1500)       Assessment:    POD 2 s/p right TCAR  Acute/chronic hypotension post tcar- resolved. sBp now at baseline which is ~ 100 mmHg  Permanent afib- rate control  remains marginal  Pt reports being taken off lopressor (presumably due to low baseline bp)_  Historically on eliquis which is on hold  Dark stools- stool + occult blood.  Hgb stable from yesterday but down 1 gm from admit  Hx AVR, mitral valve repair 2008 with mild to moderate mitral stenosis  Hx nonobstructive cad  Hx head and neck ca 2010 treated with debulking, chemo- gtube for nutrition  Anemia, tcp- hgb stable from yesterday    Plan:     Cleared for dc home by dr rizvi. Clarified with team. Plan is for asa, brillinta for now, now eliquis.   Pt tells me he was taken off lopressor and no longer taking, presumably due to low baseline bp. Given he has normal renal function, will consider po dig as an option to optimize hr is still suboptimally controlled at follow up   Cbc on monday    Discussed plan of care with patient and nursing.       Laura Hargrove, MARLENY  [phone]      As noted,    Case discussed with nursing staff.  Medical decision making is below.    Patient is off pressors with borderline rate control of his A-fib.  He is off of Lopressor presumably due to low pressures.  He is on aspirin and Brilinta.  His [id number] score is 3.  Patient given his baseline low weight he is at high risk for bleeding with triple therapy.  In the short-term, we will hold his Eliquis.  He already has heme positive stools.  He will be seeing Dr. Chamberlain from our office next week at which point his anticoagulation regimen will be reviewed.    Itz Brooks MD  L3

## 2024-10-17 NOTE — PROGRESS NOTES
[facility] UROLOGY    Chart reviewed.    Gaitan catheter placed yesterday for urinary retention.    UOP - 1100 mL/24 hours    Voiding trial on Friday 10/18 or Mon 10/21 in urology office - TE sent through [employer] system to help coordinate.    Above discussed with nurse.    Cuca Galan P.A.-C  Urology

## 2024-10-17 NOTE — PLAN OF CARE
Assumed care of Wang at 1930 s/p TICAR d#1. He is alert/oriented x4. RAYMUNDO x 3, following commands w/ equal strength 4/5. Maintaining SBP >100. Afib on tele. R neck incision dry/intact. Brilinta given as scheduled. Will monitor for signs of bleeding. Refused bolus feed /GT tonight but water flushes given. Plan of care discussed.  0630- Up in chair. No acute event over night.

## 2024-10-17 NOTE — PROGRESS NOTES
No complaints. Wound intact. Neuro intact. Urology following urine outflow- possible discharge with Gaitan- instructed wound care/ activity/ fever. Off vasopressors/ rhythm better controlled. Stressed importance of Brillinta/ ASA 81mg/ day

## 2024-10-17 NOTE — PLAN OF CARE
Patient discharged home with family. Tele and PIV removed. Deluca bag changed to leg bag. Extra catheter supplies sent home with patient. AVS reviewed with patient and family including medication instructions, deluca care, and follow up appointments. All questions answered. Patient dressed and taken to car via wheelchair.       Problem: CARDIOVASCULAR - ADULT  Goal: Maintains optimal cardiac output and hemodynamic stability  Description: INTERVENTIONS:  - Monitor vital signs, rhythm, and trends  - Monitor for bleeding, hypotension and signs of decreased cardiac output  - Evaluate effectiveness of vasoactive medications to optimize hemodynamic stability  - Monitor arterial and/or venous puncture sites for bleeding and/or hematoma  - Assess quality of pulses, skin color and temperature  - Assess for signs of decreased coronary artery perfusion - ex. Angina  - Evaluate fluid balance, assess for edema, trend weights  Outcome: Progressing     Problem: GENITOURINARY - ADULT  Goal: Absence of urinary retention  Description: INTERVENTIONS:  - Assess patient’s ability to void and empty bladder  - Monitor intake/output and perform bladder scan as needed  - Follow urinary retention protocol/standard of care  - Consider collaborating with pharmacy to review patient's medication profile  - Implement strategies to promote bladder emptying  Outcome: Not Progressing     Problem: SKIN/TISSUE INTEGRITY - ADULT  Goal: Incision(s), wounds(s) or drain site(s) healing without S/S of infection  Description: INTERVENTIONS:  - Assess and document risk factors for pressure ulcer development  - Assess and document skin integrity  - Assess and document dressing/incision, wound bed, drain sites and surrounding tissue  - Implement wound care per orders  - Initiate isolation precautions as appropriate  - Initiate Pressure Ulcer prevention bundle as indicated  Outcome: Progressing

## 2024-10-17 NOTE — DISCHARGE INSTRUCTIONS
Notify MD for temperature> 100.5/ wound drainage/ hematuria. No lifting more than 5 pounds for 4 weeks. No driving for 10 days. Can shower in 2 days. Take Brillinta 90mg po bid/ ASA 81mg / day.      --------------  Caring for Your Catheter    A deluca catheter has been placed into your bladder to drain your urine.  A small balloon in the catheter is inflated and keeps the catheter in your bladder.  Proper care of your catheter and the drainage bag can help to prevent infections.    Care of the Catheter:  The catheter should be securely taped to your thigh or abdomen to prevent pulling or increased tension on the urethra.  At least once daily, gently wash the catheter starting where the catheter enters your body and cleanse down the entire length of the catheter. Use a mild soap.  Do not use any astringents or antibacterial soap. It's best done in the shower. Make sure that any encrustations on the catheter are washed away.  Rinse well.   If you are an uncircumcised male, push the foreskin back to clean and after cleaning the catheter push the foreskin back to its normal position  Care of the Drainage Bag       Empty the drainage bag when it is ½ to 1/3 full.  The drainage bag must always be to gravity drainage and must be below the level of your bladder.  The bag should never be left lying on the floor.  Drainage bags should be cleaned each time you switch from a leg bag to a night drainage bag.  If you do not switch from a leg bag to a night drainage system, you should wash your drainage bag 1 x/week.  After disconnecting the tubing from the catheter, pour a solution of 1 part bleach to 10 parts water through the tubing and the bag. Rinse the inside and the outside of the drainage bag with water to remove all the bleach solution so that no injury to your skin will develop.  Empty the drainage bag when it is ½ to 1/3 full.  The drainage bag must always have gravity drainage and must be below the level of your  bladder.  What To Do If You Leak Around the Catheter:  Check the connections between the catheter and the drainage system to make sure they are intact and not the source of the leak.  Make sure the catheter is securely taped and holding the catheter securely and not pulling.  Readjust as necessary.  Make sure that the catheter is gravity drainage.  Call the office. You may be experiencing bladder spasms. You may be asked to come in and have the catheter checked or you may be given a medication that stops the spasms.  Special Instructions:  Increase your fluid intake so that your urine output is between 1500 and 2000cc’s/day.  Prevent constipation.  Increase daily fiber or use Miralax as needed.  Contact Our Office If:  If you see excessive blood or clots in your urine  If you have a temperature that is greater than 101.  If you don’t see any urine in your drainage bag after 3-4 hours.  Check the position of the drainage bag first and also make sure the catheter isn’t kinked.  If you have any burning, pain, purulent drainage or bleeding from around the  catheter site.  If you have persistent leaking around the catheter.

## 2024-10-18 NOTE — DISCHARGE SUMMARY
[facility]    PATIENT'S NAME: DA BISHOP   ATTENDING PHYSICIAN: Da Jones M.D.   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  Neurologically, he is intact.  He had problems with controlling his blood pressure and his heart rate due to a history of atrial fibrillation, but eventually came back to normal, being followed by [facility].  The patient also was tolerating his tube feedings.  He was instructed on wound care and activity    Dictated By Da Jones M.D.  d: 10/18/2024 12:44:03  t: 10/18/2024 13:41:40  Job [id number]  [facility]/

## 2024-10-19 NOTE — DISCHARGE SUMMARY
[facility]    PATIENT'S NAME: DA BISHOP   ATTENDING PHYSICIAN: Da Jones M.D.   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  Neurologically, he is intact.  The wounds are clean and dry.  The patient had some problems with voiding and was consulted with Urology with Cuca Wessing seeing the patient for [facility].  The patient is a possible discharge with the Gaitan intact, and we will make final decision per Urology.  He is off the vasopressors.  His rhythm seems more controlled, and blood pressure and cardiac arrhythmia per Dr. Brooks and Dr. Chamberlain.  They have discussed with [facility] Chief Medical Officer.  We will hold off the Eliquis for 1 month in order to avoid bleeding.  His hemoglobin seems to be stable today.  No evidence of any epistaxis or blood in his stool, at least for today.  He is supposed to be on Brilinta and 81 mg of aspirin.  The doses were already described.  He is aware of the risks, complications, and to follow up with me if there are any questions.  See me in about 1 week.  If there is any fever, chills, or bleeding, let me know.    Dictated By Da Jones M.D.  d: 10/17/2024 09:17:15  t: 10/18/2024 03:22:38  Job [id number]  [facility]/

## 2024-10-21 ENCOUNTER — HOSPITAL ENCOUNTER (OUTPATIENT)
Dept: LAB | Facility: HOSPITAL | Age: 79
Discharge: HOME | End: 2024-10-21
Attending: SURGERY
Payer: MEDICARE

## 2024-10-21 DIAGNOSIS — D50.0 IRON DEFICIENCY ANEMIA DUE TO CHRONIC BLOOD LOSS: ICD-10-CM

## 2024-10-21 LAB
BASOPHILS # BLD AUTO: 0.02 X10(3) UL (ref 0–0.2)
BASOPHILS NFR BLD AUTO: 0.4 %
EOSINOPHIL # BLD AUTO: 0.14 X10(3) UL (ref 0–0.7)
EOSINOPHIL NFR BLD AUTO: 2.6 %
ERYTHROCYTE [DISTWIDTH] IN BLOOD BY AUTOMATED COUNT: 12.4 %
HCT VFR BLD AUTO: 24.1 %
HGB BLD-MCNC: 8.2 G/DL
IMM GRANULOCYTES # BLD AUTO: 0.05 X10(3) UL (ref 0–1)
IMM GRANULOCYTES NFR BLD: 0.9 %
LYMPHOCYTES # BLD AUTO: 0.34 X10(3) UL (ref 1–4)
LYMPHOCYTES NFR BLD AUTO: 6.3 %
MCH RBC QN AUTO: 33.5 PG (ref 26–34)
MCHC RBC AUTO-ENTMCNC: 34 G/DL (ref 31–37)
MCV RBC AUTO: 98.4 FL
MONOCYTES # BLD AUTO: 0.51 X10(3) UL (ref 0.1–1)
MONOCYTES NFR BLD AUTO: 9.4 %
NEUTROPHILS # BLD AUTO: 4.35 X10 (3) UL (ref 1.5–7.7)
NEUTROPHILS # BLD AUTO: 4.35 X10(3) UL (ref 1.5–7.7)
NEUTROPHILS NFR BLD AUTO: 80.4 %
PLATELET # BLD AUTO: 176 10(3)UL (ref 150–450)
RBC # BLD AUTO: 2.45 X10(6)UL
WBC # BLD AUTO: 5.4 X10(3) UL (ref 4–11)

## 2024-10-21 PROCEDURE — 85025 COMPLETE CBC W/AUTO DIFF WBC: CPT

## 2024-10-21 PROCEDURE — 36415 COLL VENOUS BLD VENIPUNCTURE: CPT

## 2024-10-22 ENCOUNTER — HOSPITAL ENCOUNTER (EMERGENCY)
Facility: HOSPITAL | Age: 79
Discharge: HOME | End: 2024-10-22
Attending: EMERGENCY MEDICINE
Payer: MEDICARE

## 2024-10-22 VITALS
WEIGHT: 130 LBS | OXYGEN SATURATION: 98 % | TEMPERATURE: 98 F | DIASTOLIC BLOOD PRESSURE: 70 MMHG | HEART RATE: 99 BPM | HEIGHT: 69 IN | SYSTOLIC BLOOD PRESSURE: 110 MMHG | RESPIRATION RATE: 18 BRPM | BODY MASS INDEX: 19.26 KG/M2

## 2024-10-22 DIAGNOSIS — R31.0 GROSS HEMATURIA: Primary | ICD-10-CM

## 2024-10-22 DIAGNOSIS — N30.01 ACUTE CYSTITIS WITH HEMATURIA: ICD-10-CM

## 2024-10-22 LAB
BILIRUB UR QL STRIP.AUTO: NEGATIVE
GLUCOSE UR STRIP.AUTO-MCNC: NORMAL MG/DL
KETONES UR STRIP.AUTO-MCNC: NEGATIVE MG/DL
LEUKOCYTE ESTERASE UR QL STRIP.AUTO: 500
NITRITE UR QL STRIP.AUTO: NEGATIVE
PH UR STRIP.AUTO: 7 [PH] (ref 5–8)
PROT UR STRIP.AUTO-MCNC: 200 MG/DL
RBC #/AREA URNS AUTO: >10 /HPF
SP GR UR STRIP.AUTO: 1.01 (ref 1–1.03)
UROBILINOGEN UR STRIP.AUTO-MCNC: NORMAL MG/DL
WBC #/AREA URNS AUTO: >50 /HPF
WBC CLUMPS UR QL AUTO: PRESENT /HPF

## 2024-10-22 PROCEDURE — 99283 EMERGENCY DEPT VISIT LOW MDM: CPT

## 2024-10-22 PROCEDURE — 87086 URINE CULTURE/COLONY COUNT: CPT | Performed by: EMERGENCY MEDICINE

## 2024-10-22 PROCEDURE — 87077 CULTURE AEROBIC IDENTIFY: CPT | Performed by: EMERGENCY MEDICINE

## 2024-10-22 PROCEDURE — 81001 URINALYSIS AUTO W/SCOPE: CPT | Performed by: EMERGENCY MEDICINE

## 2024-10-22 PROCEDURE — 87186 SC STD MICRODIL/AGAR DIL: CPT | Performed by: EMERGENCY MEDICINE

## 2024-10-22 RX ORDER — CEPHALEXIN 250 MG/5ML
500 POWDER, FOR SUSPENSION ORAL 3 TIMES DAILY
Qty: 210 ML | Refills: 0 | Status: ON HOLD | OUTPATIENT
Start: 2024-10-22 | End: 2024-10-29

## 2024-10-22 NOTE — DISCHARGE INSTRUCTIONS
Follow-up with urology over the next few days take the antibiotics as prescribed return any problems.

## 2024-10-22 NOTE — ED INITIAL ASSESSMENT (HPI)
Patient had deluca catheter removed yesterday and now has blood in urine and dysuria. No fevers. Patient is on blood thinners.

## 2024-10-22 NOTE — ED PROVIDER NOTES
Patient Seen in: [facility] Emergency Department      History     Chief Complaint   Patient presents with    Urinary Symptoms     Stated Complaint: Blood in urine    Subjective:   HPI      78-year-old male here for hematuria.  The patient recently was hospitalized about a week or so ago for a carotid artery procedure and developed some urinary retention and a Gaitan catheter placed he was seen by urology yesterday had a catheter removed.  He does take ticagrelor and aspirin.  He does not feel like he is having any difficulty urinating but has had gross blood in his urine in the last since last evening and this morning.  No flank pain no vomiting    Objective:     Past Medical History:    Arrhythmia    Cancer (HCC)    sqaumos cell     Cataract    Exposure to medical diagnostic radiation    Heart disease    Heart valve disease    Valve replaced in     High cholesterol    Hypothyroidism    Prostate enlargement    Rheumatic fever    Squamous cell carcinoma              Past Surgical History:   Procedure Laterality Date    Cabg      Cardiac valve surgery      Other surgical history      sqaumos cell cancer removed    Valve replacement  2008    pig valve                Social History     Socioeconomic History    Marital status:    Tobacco Use    Smoking status: Former     Current packs/day: 0.00     Types: Cigarettes     Quit date: 2006     Years since quittin.1    Smokeless tobacco: Former   Vaping Use    Vaping status: Never Used   Substance and Sexual Activity    Alcohol use: Yes     Comment: socially    Drug use: No     Social Drivers of Health     Food Insecurity: No Food Insecurity (10/15/2024)    Food Insecurity     Food Insecurity: Never true   Transportation Needs: No Transportation Needs (10/15/2024)    Transportation Needs     Lack of Transportation: No   Physical Activity: Sufficiently Active (2020)    [facility] [employer] [employer]    Exercise  Vital Sign     Days of Exercise per Week: 7 days     Minutes of Exercise per Session: 60 min    [facility]    Social Connections   Housing Stability: Low Risk  (10/15/2024)    Housing Stability     Housing Instability: No                  Physical Exam     ED Triage Vitals [10/22/24 0650]   /74   Pulse 103   Resp 20   Temp 97.6 °F (36.4 °C)   Temp src    SpO2 100 %   O2 Device None (Room air)       Current Vitals:   Vital Signs  BP: 110/70  Pulse: 99  Resp: 18  Temp: 97.6 °F (36.4 °C)    Oxygen Therapy  SpO2: 98 %  O2 Device: None (Room air)        Physical Exam  Patient is alert and orient x 3 no acute distress lungs are clear cardiovascular exam shows regular rate and rhythm without murmurs abdomen soft and nontender    ED Course     Labs Reviewed   URINALYSIS WITH CULTURE REFLEX - Abnormal; Notable for the following components:       Result Value    Clarity Urine Ex.Turbid (*)     Blood Urine 3+ (*)     Protein Urine 200 (*)     Leukocyte Esterase Urine 500 (*)     WBC Urine >50 (*)     RBC Urine >10 (*)     Bacteria Urine 1+ (*)     Squamous Epi. Cells Few (*)     WBC Clump Present (*)     All other components within normal limits   URINE CULTURE, ROUTINE     Bladder scan after urinated showed empty bladder.       Urinalysis shows greater than 50 WBCs       MDM      Initial differential diagnosis includes gross hematuria with urinary retention urinary tract infection        Medical Decision Making    Patient has gross hematuria however no evidence of urinary tract obstruction he was show no evidence of UTI was given antibiotics advised follow-up with urology in a few days return if worse.  I do not think he warrants any imaging or a Gaitan catheter.  Disposition and Plan     Clinical Impression:  1. Gross hematuria    2. Acute cystitis with hematuria         Disposition:  Discharge  10/22/2024  9:14 am    Follow-up:  Myles Srivastava MD  [address]  [address]  [facility] [address]  [phone]    Follow up in 3 day(s)            Medications Prescribed:  Discharge Medication List as of 10/22/2024  9:15 AM        START taking these medications    Details   cephALEXin 250 MG/5ML Oral Recon Susp Take 10 mL (500 mg total) by mouth 3 (three) times daily for 7 days., Normal, Disp-210 mL, R-0                 Supplementary Documentation:

## 2024-10-23 ENCOUNTER — HOSPITAL ENCOUNTER (EMERGENCY)
Facility: HOSPITAL | Age: 79
Discharge: HOME | End: 2024-10-24
Attending: EMERGENCY MEDICINE
Payer: MEDICARE

## 2024-10-23 DIAGNOSIS — T85.528A DISLODGED GASTROSTOMY TUBE: Primary | ICD-10-CM

## 2024-10-23 PROCEDURE — 99283 EMERGENCY DEPT VISIT LOW MDM: CPT

## 2024-10-23 PROCEDURE — 43762 RPLC GTUBE NO REVJ TRC: CPT

## 2024-10-24 ENCOUNTER — APPOINTMENT (OUTPATIENT)
Dept: GENERAL RADIOLOGY | Facility: HOSPITAL | Age: 79
End: 2024-10-24
Attending: EMERGENCY MEDICINE
Payer: MEDICARE

## 2024-10-24 VITALS
SYSTOLIC BLOOD PRESSURE: 96 MMHG | HEART RATE: 100 BPM | RESPIRATION RATE: 21 BRPM | WEIGHT: 130 LBS | OXYGEN SATURATION: 95 % | HEIGHT: 70 IN | BODY MASS INDEX: 18.61 KG/M2 | DIASTOLIC BLOOD PRESSURE: 69 MMHG | TEMPERATURE: 98 F

## 2024-10-24 PROCEDURE — 74018 RADEX ABDOMEN 1 VIEW: CPT | Performed by: EMERGENCY MEDICINE

## 2024-10-24 RX ORDER — DIATRIZOATE MEGLUMINE AND DIATRIZOATE SODIUM 660; 100 MG/ML; MG/ML
25 SOLUTION ORAL; RECTAL ONCE
Status: COMPLETED | OUTPATIENT
Start: 2024-10-24 | End: 2024-10-24

## 2024-10-24 NOTE — ED PROVIDER NOTES
Patient Seen in: [facility] Emergency Department      History     Chief Complaint   Patient presents with    Cath Tube Problem     Stated Complaint: PULLED OUT G TUBE    Subjective:   HPI      78-year-old male presents for evaluation of dislodged G-tube.  Patient accidentally pulled out his G-tube which has been in place for about a year today.  He uses it for all food and medications.    Objective:     Past Medical History:    Arrhythmia    Cancer (HCC)    sqaumos cell     Cataract    Exposure to medical diagnostic radiation    Heart disease    Heart valve disease    Valve replaced in     High cholesterol    Hypothyroidism    Prostate enlargement    Rheumatic fever    Squamous cell carcinoma              Past Surgical History:   Procedure Laterality Date    Cabg      Cardiac valve surgery      Other surgical history      sqaumos cell cancer removed    Valve replacement      pig valve                Social History     Socioeconomic History    Marital status:    Tobacco Use    Smoking status: Former     Current packs/day: 0.00     Types: Cigarettes     Quit date: 2006     Years since quittin.1    Smokeless tobacco: Former   Vaping Use    Vaping status: Never Used   Substance and Sexual Activity    Alcohol use: Yes     Comment: socially    Drug use: No     Social Drivers of Health     Food Insecurity: No Food Insecurity (10/15/2024)    Food Insecurity     Food Insecurity: Never true   Transportation Needs: No Transportation Needs (10/15/2024)    Transportation Needs     Lack of Transportation: No   Physical Activity: Sufficiently Active (2020)    Received from [facility], [facility]    Exercise Vital Sign     Days of Exercise per Week: 7 days     Minutes of Exercise per Session: 60 min    [facility] from [facility], [facility]    Social Connections   Housing Stability: Low Risk  (10/15/2024)    Housing Stability      Housing Instability: No                  Physical Exam     ED Triage Vitals [10/23/24 2232]   BP (!) 85/56   Pulse 100   Resp 21   Temp 98.4 °F (36.9 °C)   Temp src Oral   SpO2 95 %   O2 Device None (Room air)       Current Vitals:   Vital Signs  BP: 96/69  Pulse: 100  Resp: 21  Temp: 98.4 °F (36.9 °C)  Temp src: Oral    Oxygen Therapy  SpO2: 95 %  O2 Device: None (Room air)        Physical Exam     General: Alert, oriented, no apparent distress  Neck: Supple  Lungs: Clear to auscultation bilaterally.  Heart: Regular rate and rhythm.  Abdomen: Soft, nontender.  Left upper quadrant ostomy, G-tube with deflated balloon  Skin: No rash.  No edema.  Neurologic: No focal neurologic deficits.  Normal speech pattern  Musculoskeletal: No tenderness or deformity noted.  Full range of motion throughout.      ED Course   Labs Reviewed - No data to display                MDM        Abdominal wall cleaned with Betadine.  22 Haitian G-tube replaced easily and balloon inflated to 10 cc    Follow-up KUB with Gastrografin shows the tube intraluminally within the stomach and contrast filling the stomach and duodenum    Medical Decision Making  Amount and/or Complexity of Data Reviewed  Independent Historian: spouse        Disposition and Plan     Clinical Impression:  1. Dislodged gastrostomy tube         Disposition:  Discharge  10/24/2024  1:42 am    Follow-up:  No follow-up provider specified.        Medications Prescribed:  Current Discharge Medication List              Supplementary Documentation:

## 2024-10-27 ENCOUNTER — HOSPITAL ENCOUNTER (INPATIENT)
Facility: HOSPITAL | Age: 79
LOS: 4 days | Discharge: HOME | End: 2024-10-31
Attending: STUDENT IN AN ORGANIZED HEALTH CARE EDUCATION/TRAINING PROGRAM | Admitting: HOSPITALIST
Payer: MEDICARE

## 2024-10-27 ENCOUNTER — APPOINTMENT (OUTPATIENT)
Dept: GENERAL RADIOLOGY | Facility: HOSPITAL | Age: 79
End: 2024-10-27
Attending: STUDENT IN AN ORGANIZED HEALTH CARE EDUCATION/TRAINING PROGRAM
Payer: MEDICARE

## 2024-10-27 ENCOUNTER — HOSPITAL ENCOUNTER (INPATIENT)
Facility: HOSPITAL | Age: 79
LOS: 4 days | Discharge: HOME HEALTH | End: 2024-10-31
Attending: STUDENT IN AN ORGANIZED HEALTH CARE EDUCATION/TRAINING PROGRAM | Admitting: HOSPITALIST
Payer: MEDICARE

## 2024-10-27 PROBLEM — K92.2 GI BLEED: Status: ACTIVE | Noted: 2024-01-01

## 2024-10-27 PROBLEM — K92.1 MELENA: Status: ACTIVE | Noted: 2024-01-01

## 2024-10-27 PROBLEM — K92.2 GASTROINTESTINAL HEMORRHAGE: Status: ACTIVE | Noted: 2024-10-27

## 2024-10-27 PROBLEM — K92.1 MELENA: Status: ACTIVE | Noted: 2024-10-27

## 2024-10-27 PROBLEM — D62 ANEMIA DUE TO ACUTE BLOOD LOSS: Status: ACTIVE | Noted: 2024-01-01

## 2024-10-27 PROBLEM — D62 ANEMIA DUE TO ACUTE BLOOD LOSS: Status: ACTIVE | Noted: 2024-10-27

## 2024-10-27 PROBLEM — K92.2 GI BLEED: Status: ACTIVE | Noted: 2024-10-27

## 2024-10-27 PROBLEM — I77.9 CAROTID ARTERY DISEASE (HCC): Status: ACTIVE | Noted: 2024-01-01

## 2024-10-27 PROBLEM — Z98.890 STATUS POST CAROTID ENDARTERECTOMY: Status: ACTIVE | Noted: 2024-10-27

## 2024-10-27 PROBLEM — E87.1 HYPONATREMIA: Status: ACTIVE | Noted: 2024-01-01

## 2024-10-27 PROBLEM — K92.2 GASTROINTESTINAL HEMORRHAGE: Status: ACTIVE | Noted: 2024-01-01

## 2024-10-27 PROBLEM — Z98.890 STATUS POST CAROTID ENDARTERECTOMY: Status: ACTIVE | Noted: 2024-01-01

## 2024-10-27 PROBLEM — E87.1 HYPONATREMIA: Status: ACTIVE | Noted: 2024-10-27

## 2024-10-27 PROBLEM — I77.9 CAROTID ARTERY DISEASE (HCC): Status: ACTIVE | Noted: 2024-10-27

## 2024-10-27 NOTE — ED INITIAL ASSESSMENT (HPI)
Multiple visit to ER recently after carotid stent placement. Tonight worsening GABRIELLA. + productive cough. Hx of throat Ca.

## 2024-10-27 NOTE — ED PROVIDER NOTES
Patient Seen in: [facility] Emergency Department      History     Chief Complaint   Patient presents with    Difficulty Breathing     Stated Complaint: GABRIELLA    Subjective:   HPI    Patient is an 78-year-old male presenting the emergency department for evaluation of difficulty breathing and generalized weakness.  Patient had just undergone a carotid endarterectomy.  He has had a prior ER visit following the procedure for blood in urine followed by a visit when he accidentally dislodged his G-tube.  Patient reports that he has been having a cough and feeling weak ever since he got out of the hospital but those symptoms worsened.  He has also had black stools.    Objective:   Past Medical History:    Arrhythmia    Cancer (HCC)    sqaumos cell     Cataract    Exposure to medical diagnostic radiation    Heart disease    Heart valve disease    Valve replaced in     High cholesterol    Hypothyroidism    Prostate enlargement    Rheumatic fever    Squamous cell carcinoma              Past Surgical History:   Procedure Laterality Date    Cabg      Cardiac valve surgery      Other surgical history      sqaumos cell cancer removed    Valve replacement  2008    pig valve                Social History     Socioeconomic History    Marital status:    Tobacco Use    Smoking status: Former     Current packs/day: 0.00     Types: Cigarettes     Quit date: 2006     Years since quittin.1    Smokeless tobacco: Former   Vaping Use    Vaping status: Never Used   Substance and Sexual Activity    Alcohol use: Yes     Comment: socially    Drug use: No     Social Drivers of Health     Food Insecurity: No Food Insecurity (10/15/2024)    Food Insecurity     Food Insecurity: Never true   Transportation Needs: No Transportation Needs (10/15/2024)    Transportation Needs     Lack of Transportation: No   Physical Activity: Sufficiently Active (2020)    [facility], [facility]     Exercise Vital Sign     Days of Exercise per Week: 7 days     Minutes of Exercise per Session: 60 min    Received from [facility], [facility]    Social Connections   Housing Stability: Low Risk  (10/15/2024)    Housing Stability     Housing Instability: No              Review of Systems    Positive for stated complaint: GABRIELLA  Other systems are as noted in HPI.  Constitutional and vital signs reviewed.      All other systems reviewed and negative except as noted above.    Physical Exam     ED Triage Vitals [10/27/24 0301]   /67   Pulse (!) 131   Resp (!) 30   Temp 97.9 °F (36.6 °C)   Temp src Oral   SpO2 (!) 85 %   O2 Device None (Room air)       Current:BP (!) 88/61   Pulse 120   Temp (!) 101.1 °F (38.4 °C) (Temporal)   Resp (!) 30   Ht 177.8 cm (5' 10\")   Wt 56.7 kg   SpO2 99%   BMI 17.94 kg/m²         Physical Exam    Constitutional: No apparent distress, appears pale and fatigued  Eyes: No scleral icterus  Heart: regular rate rhythm, no murmurs  Lungs: tachypnea, rhonchi noted in the left upper lung fields.  Abdomen: soft and nontender  Rectal exam: No tenderness, Hemoccult positive stool  Skin: No rash  Neuro: Alert and oriented ×3          ED Course/ My interpretations:     Labs Reviewed   CBC WITH DIFFERENTIAL WITH PLATELET - Abnormal; Notable for the following components:       Result Value    RBC 1.96 (*)     HGB 6.4 (*)     HCT 19.4 (*)     Neutrophil Absolute Prelim 9.18 (*)     Neutrophil Absolute 9.18 (*)     Lymphocyte Absolute 0.15 (*)     All other components within normal limits   COMP METABOLIC PANEL (14) - Abnormal; Notable for the following components:    Glucose 232 (*)     Sodium 129 (*)     Chloride 94 (*)     BUN 58 (*)     eGFR-Cr 57 (*)     AST 38 (*)     Alkaline Phosphatase 127 (*)     All other components within normal limits   PROTHROMBIN TIME (PT) - Abnormal; Notable for the following components:    PT 16.7 (*)     INR 1.33 (*)     All  other components within normal limits   LACTIC ACID, PLASMA - Abnormal; Notable for the following components:    Lactic Acid 2.9 (*)     All other components within normal limits   PRO BETA NATRIURETIC PEPTIDE - Abnormal; Notable for the following components:    Pro-Beta Natriuretic Peptide 7,099 (*)     All other components within normal limits   PTT, ACTIVATED - Normal   TROPONIN I HIGH SENSITIVITY - Normal   SARS-COV-2/FLU A AND B/RSV BY PCR (GENEXPERT) - Normal    Narrative:     This test is intended for the qualitative detection and differentiation of SARS-CoV-2, influenza A, influenza B, and respiratory syncytial virus (RSV) viral RNA in nasopharyngeal or nares swabs from individuals suspected of respiratory viral infection consistent with COVID-19 by their healthcare provider. Signs and symptoms of respiratory viral infection due to SARS-CoV-2, influenza, and RSV can be similar.    Test performed using the Xpert Xpress SARS-CoV-2/FLU/RSV (real time RT-PCR)  assay on the [employer] instrument, [employer], [place] [address].   This test is being used under the Food and Drug Administration's Emergency Use Authorization.    The authorized Fact Sheet for Healthcare Providers for this assay is available upon request from the laboratory.   LACTIC ACID REFLEX POST POSTIVE   TYPE AND SCREEN    Narrative:     The following orders were created for panel order Type and screen.  Procedure                               Abnormality         Status                     ---------                               -----------         ------                     ABORH (Blood Type)[[id number]]                               Final result               Antibody Screen[[id number]]                                  Final result                 Please view results for these tests on the individual orders.   PREPARE RBC   ABORH (BLOOD TYPE)   ANTIBODY SCREEN   RAINBOW DRAW BLUE   RAINBOW DRAW GOLD   BLOOD CULTURE   BLOOD CULTURE     EKG    Rate,  intervals and axes as noted on EKG Report.  Rate: 126  Rhythm: Atrial Fibrillation  Reading:              My independent imaging interpretation:      Procedures    CBC With Differential With Platelet    Comp Metabolic Panel (14)    Prothrombin Time (PT)    PTT, Activated    Lactic Acid, Plasma    Troponin I (High Sensitivity)    Pro Beta Natriuretic Peptide    Lactic Acid Reflex Post Positive    XR CHEST AP PORTABLE  (CPT=71045)   Left upper lobe infiltrate noted.  All available radiology reports for this visit reviewed.      Medications given:  Orders Placed This Encounter    CBC With Differential With Platelet    Comp Metabolic Panel (14)    Prothrombin Time (PT)    PTT, Activated    Lactic Acid, Plasma    Troponin I (High Sensitivity)    Pro Beta Natriuretic Peptide    Lactic Acid Reflex Post Positive    pantoprazole (Protonix) 40 mg in sodium chloride 0.9% PF 10 mL IV push    FOLLOWED BY Linked Order Group     sodium chloride 0.9 % IV bolus 500 mL     sodium chloride 0.9% infusion    cefTRIAXone (Rocephin) 2 g in sodium chloride 0.9% 100 mL IVPB-ADDV    azithromycin (Zithromax) 500 mg in sodium chloride 0.9% 250mL IVPB premix    sterile water for injection (PF) injection    acetaminophen (Tylenol) 160 MG/5ML oral liquid 650 mg    sodium chloride 0.9 % IV bolus 1,205 mL                         MDM      Extensive differential diagnosis was considered for the patient including COPD, respiratory failure, pneumonia, pneumothorax, PE, acute blood loss anemia, and other pulmonary, infectious, and other pathology.    Workup in the emergency department is consistent with hypoxia secondary to pneumonia and severe anemia secondary to GI bleed.  Patient started on IV antibiotics, given IV Protonix, blood transfusion and IV fluids ordered.  Patient admitted the hospital for further evaluation and treatment.    Gastroenterology consultation initiated.  Given the patient is shortly postoperative from carotid endarterectomy  cardiovascular also notified.    While in the ER patient developed a fever and his blood pressure trended downward.  Additional IV fluids ordered.  I am concerned about development of sepsis.  Patient's admission bed changed to intensive care unit.  Discussed with critical care physician on the phone.    Disposition and Plan     Clinical Impression:  1. Community acquired pneumonia, unspecified laterality    2. Melena    3. Status post carotid endarterectomy    4. Hyponatremia         Disposition:  Admit  10/27/2024  6:08 am    Follow-up:  No follow-up provider specified.        Medications Prescribed:  Current Discharge Medication List              Supplementary Documentation:         Hospital Problems       Present on Admission  Date Reviewed: 10/15/2024            ICD-10-CM Noted POA    * (Principal) Community acquired pneumonia, unspecified laterality J18.9 11/28/2020 Unknown            In excess of 30 minutes of critical care time (exclusive of billable procedures) was administered to manage the patient's unstable vital signs due to his hypotension. This involved direct patient intervention, complex decision making, and/or extensive discussions with the patient, family, and clinical staff.                   [facility]   part of Located within [facility]      Sepsis Reassessment Note    BP (!) 88/61   Pulse 120   Temp (!) 101.1 °F (38.4 °C) (Temporal)   Resp (!) 30   Ht 177.8 cm (5' 10\")   Wt 56.7 kg   SpO2 99%   BMI 17.94 kg/m²      I completed the sepsis reassessment at 0654    Cardiac:  Regularity: Irregular  Rate: Tachycardic  Heart Sounds: S1,S2    Lungs:   Right: Clear  Left: Rhonchi    Peripheral Pulses:  Radial: Right 1+ or Left 1+      Capillary Refill:  <3 Secs    Skin:  Temp/Moisture: Warm and Dry  Color: Normal      Nneka Ho MD  10/27/2024  6:54 AM                       Hospital Problems       Present on Admission  Date Reviewed: 10/15/2024            ICD-10-CM Noted POA    * (Principal)  Community acquired pneumonia, unspecified laterality J18.9 11/28/2020 Unknown           Documentation created with the aid of Dragon voice recognition software.  Although efforts were made to ensure the accuracy of the note, some inaccuracies may persist.

## 2024-10-27 NOTE — PLAN OF CARE
Took over pt care this am. Admitted to the ICU. For PNA/Sepsis and GI bleed. No active bleeding noted. Hgb noted. 1 unit PRBC given. Stable Hgb post. Pt on 1 L NC. Strict NPO. Meds per Gtube. Monitor for any S/S bleeding. Wife updated on status and plan of care.     Problem: SAFETY ADULT - FALL  Goal: Free from fall injury  Description: INTERVENTIONS:  - Assess pt frequently for physical needs  - Identify cognitive and physical deficits and behaviors that affect risk of falls.  - [place] fall precautions as indicated by assessment.  - Educate pt/family on patient safety including physical limitations  - Instruct pt to call for assistance with activity based on assessment  - Modify environment to reduce risk of injury  - Provide assistive devices as appropriate  - Consider OT/PT consult to assist with strengthening/mobility  - Encourage toileting schedule  Outcome: Progressing     Problem: DISCHARGE PLANNING  Goal: Discharge to home or other facility with appropriate resources  Description: INTERVENTIONS:  - Identify barriers to discharge w/pt and caregiver  - Include patient/family/discharge partner in discharge planning  - Arrange for needed discharge resources and transportation as appropriate  - Identify discharge learning needs (meds, wound care, etc)  - Arrange for interpreters to assist at discharge as needed  - Consider post-discharge preferences of patient/family/discharge partner  - Complete POLST form as appropriate  - Assess patient's ability to be responsible for managing their own health  - Refer to Case Management Department for coordinating discharge planning if the patient needs post-hospital services based on physician/LIP order or complex needs related to functional status, cognitive ability or social support system  Outcome: Progressing     Problem: Altered Communication/Language Barrier  Goal: Patient/Family is able to understand and participate in their care  Description: Interventions:  -  Assess communication ability and preferred communication style  - Implement communication aides and strategies  - Use visual cues when possible  - Listen attentively, be patient, do not interrupt  - Minimize distractions  - Allow time for understanding and response  - Establish method for patient to ask for assistance (call light)  - Provide an  as needed  - Communicate barriers and strategies to overcome with those who interact with patient  Outcome: Progressing     Problem: GASTROINTESTINAL - ADULT  Goal: Maintains or returns to baseline bowel function  Description: INTERVENTIONS:  - Assess bowel function  - Maintain adequate hydration with IV or PO as ordered and tolerated  - Evaluate effectiveness of GI medications  - Encourage mobilization and activity  - Obtain nutritional consult as needed  - Establish a toileting routine/schedule  - Consider collaborating with pharmacy to review patient's medication profile  Outcome: Progressing

## 2024-10-27 NOTE — SLP NOTE
Order received, chart reviewed.  Patient with chronic PEG in place due to severe oropharyngeal dysphagia s/p head & neck cancer with neck dissection and chemoXRT.  Patient on complete enteral means of nutrition/hydration/medication.  Per EMR, patient has outpatient SLP at OSH for follow-up as needed/desires.  SLP will sign off at this time.    Marybel Amaya MS CCC-SLP/L, pager [phone]  Speech-LanguagePathologist

## 2024-10-27 NOTE — H&P
[facility]     aWng Romero Patient Status:  Emergency    1945 MRN [id number]   Location [facility] EMERGENCY DEPARTMENT Attending Nneka Ho MD   Hosp Day # 0 PCP Colette Steele MD     Chief Complaint: SOB abd cough    Subjective:    History of Present Illness:     Wang Romero is a 78 year old male with     History/Other:    Past Medical History:  Past Medical History:    Arrhythmia    Cancer (HCC)    sqaumos cell     Cataract    Exposure to medical diagnostic radiation    Heart disease    Heart valve disease    Valve replaced in     High cholesterol    Hypothyroidism    Prostate enlargement    Rheumatic fever    Squamous cell carcinoma     Past Surgical History:   Past Surgical History:   Procedure Laterality Date    Cabg      Cardiac valve surgery      Other surgical history      sqaumos cell cancer removed    Valve replacement      pig valve      Family History:   No family history on file.  Social History:    reports that he quit smoking about 18 years ago. His smoking use included cigarettes. He has quit using smokeless tobacco. He reports current alcohol use. He reports that he does not use drugs.     Allergies: Allergies[1]    Medications:  Medications Ordered Prior to Encounter[2]    Review of Systems:   A comprehensive review of systems was completed.    Pertinent positives and negatives noted in the HPI.    Objective:   Physical Exam:    BP 97/60   Pulse 119   Temp 98.3 °F (36.8 °C) (Oral)   Resp 24   Ht 5' 10\" (1.778 m)   Wt 125 lb (56.7 kg)   SpO2 97%   BMI 17.94 kg/m²   General: No acute distress, Alert  Respiratory: No rhonchi, no wheezes  Cardiovascular: S1, S2. Regular rate and rhythm  Abdomen: Soft, Non-tender, non-distended, positive bowel sounds  Neuro: No new focal deficits  Extremities: No edema      Results:    Labs:      Labs Last 24 Hours:    Recent Labs   Lab 10/21/24  1131 10/27/24  0339   RBC 2.45* 1.96*    HGB 8.2* 6.4*   HCT 24.1* 19.4*   MCV 98.4 99.0   MCH 33.5 32.7   MCHC 34.0 33.0   RDW 12.4 13.6   NEPRELIM 4.35 9.18*   WBC 5.4 10.1   .0 267.0       Recent Labs   Lab 10/27/24  0340   *   BUN 58*   CREATSERUM 1.27   EGFRCR 57*   CA 9.2   ALB 3.7   *   K 3.6   CL 94*   CO2 30.0   ALKPHO 127*   AST 38*   ALT 19   BILT 1.1   TP 6.6       Lab Results   Component Value Date    PT 22.0 (H) 01/08/2013    PT 19.0 (H) 01/07/2013    PT 25.0 (H) 01/06/2013    INR 1.33 (H) 10/27/2024    INR 1.41 (H) 10/11/2024    INR 1.28 (H) 12/03/2020       Recent Labs   Lab 10/27/24  0340   TROPHS 36       Recent Labs   Lab 10/27/24  0340   PBNP 7,099*       No results for input(s): \"PCT\" in the last 168 hours.    Imaging: Imaging data reviewed in Epic.    Assessment & Plan:      # Septic shock  - Fever, tachycardia, tachypnia,pneumonia, hypotension  - Patient getting sepsis bolus in ED  - admit to ICU  -Continue broad-spectrum antibiotics  -Cultures pending    # Pneumonia  - Will continue on ceftriaxone and azithromycin  - cultures pending    # GI bleed  - Will continue on IV ppi  - GI on consult    # Profound anemia   -secondary to GI bleed  -Patient getting 1 unit PRBCs    # Carotid artery disease  - recent stents placed  - Will hold ASA and ticangralor with GI bleed.  - Will contibue on statin therapy.    # Hypothyroidism  - Will continue on levothyroxine.    Plan of care discussed with patient at bedside.    Hema Crouch, DO    Supplementary Documentation:     The 21st Century Cures Act makes medical notes like these available to patients in the interest of transparency. Please be advised this is a medical document. Medical documents are intended to carry relevant information, facts as evident, and the clinical opinion of the practitioner. The medical note is intended as peer to peer communication and may appear blunt or direct. It is written in medical language and may contain abbreviations or  verbiage that are unfamiliar.                                     [1]   Allergies  Allergen Reactions    Compazine [Prochlorperazine] HALLUCINATION   [2]   Current Facility-Administered Medications on File Prior to Encounter   Medication Dose Route Frequency Provider Last Rate Last Admin    [COMPLETED] Diatrizoate Meglumine & Sodium (GASTROGRAFIN) 66-10 % oral solution 25 mL  25 mL Per G Tube Once Radha Mandel MD   25 mL at 10/24/24 0038    [COMPLETED] digoxin (Lanoxin) 250 MCG/ML injection 250 mcg  250 mcg Intravenous Once Laura Hargrove APN   250 mcg at 10/16/24 1016    [COMPLETED] iodixanol (VISIPAQUE) 320 MG/ML injection 100 mL  100 mL Injection ONCE PRN Wang Jones MD   60 mL at 10/15/24 1100    [] lactated ringers IV bolus 500 mL  500 mL Intravenous Once PRN Aashish Acharya MD        [] atropine 0.1 MG/ML injection 0.5 mg  0.5 mg Intravenous PRN Aashish Acharya MD        [] naloxone (Narcan) 0.4 MG/ML injection 0.08 mg  0.08 mg Intravenous PRN Aashish Acharya MD        [] fentaNYL (Sublimaze) 50 mcg/mL injection 25 mcg  25 mcg Intravenous Q5 Min PRN Aashish Acharya MD        Or    [] fentaNYL (Sublimaze) 50 mcg/mL injection 50 mcg  50 mcg Intravenous Q5 Min PRN Aashish Acharya MD        [] HYDROmorphone (Dilaudid) 1 MG/ML injection 0.2 mg  0.2 mg Intravenous Q5 Min PRN Aashish Acharya MD        Or    [] HYDROmorphone (Dilaudid) 1 MG/ML injection 0.4 mg  0.4 mg Intravenous Q5 Min PRN Aashish Acharya MD        Or    [] HYDROmorphone (Dilaudid) 1 MG/ML injection 0.6 mg  0.6 mg Intravenous Q5 Min PRN Aashish Acharya MD        [COMPLETED] ceFAZolin (Ancef) 2g in 10mL IV syringe premix  2 g Intravenous Q8H Wang Jones  mL/hr at 10/16/24 0005 2 g at 10/16/24 0005    [COMPLETED] influenza virus trivalent high dose PF (Fluzone HD) 0.5 mL injection (ages >/= 65 years) 0.5 mL  0.5 mL Intramuscular Prior to discharge Derrell Raya MD    0.5 mL at 10/17/24 1214    [COMPLETED] iopamidol 76% (ISOVUE-370) injection for power injector  75 mL Intravenous ONCE PRN Frommelt, Julie A, APRN   75 mL at 09/26/24 1118     Current Outpatient Medications on File Prior to Encounter   Medication Sig Dispense Refill    cephALEXin 250 MG/5ML Oral Recon Susp Take 10 mL (500 mg total) by mouth 3 (three) times daily for 7 days. 210 mL 0    ticagrelor 90 MG Oral Tab Take 1 tablet (90 mg total) by mouth every 12 (twelve) hours.      aspirin 81 MG Oral Tab EC Take 1 tablet (81 mg total) by mouth daily.      simvastatin 20 MG Oral Tab Take 1 tablet (20 mg total) by mouth nightly.      Levothyroxine Sodium 50 MCG Oral Tab 1 tablet (50 mcg total) by Per G Tube route before breakfast.  0

## 2024-10-27 NOTE — CONSULTS
[facility]  GASTROENTEROLOGY NEW CONSULT NOTE   [facility]     Wang Romero Patient Status:  Inpatient    1945 MRN [id number]   Location [facility] 4SW-A Attending Hema Crouch*   Hosp Day # 0 PCP Colette Steele MD       Reason for Consultation:   Melena, anemia    History of Present Illness (HPI):  Wang Romero is a 78 year old male with Chronic medical conditions including head and neck cancer s/p radiation therapy complicated by dysphagia which required G-tube placement who presented to the ER with dyspnea.  He was found to be febrile in the ER and found to have pneumonia.  He also notes melena for the past 5 days and hemoglobin on admission was 6.2.  Patient did note a nosebleed during this period of time but is unsure if he swallowed the blood.  He has been on Brilinta and Eliquis as he had a recent carotid stent placed earlier this month.  Patient was noted to be hypotensive.  He did receive 1 unit PRBC transfusion however repeat hemoglobin is 6.4 so he is currently receiving 1 additional unit PRBC.    Past Medical History:  Past Medical History:    Arrhythmia    Cancer (HCC)    sqaumos cell     Cataract    Exposure to medical diagnostic radiation    Heart disease    Heart valve disease    Valve replaced in     High cholesterol    Hypothyroidism    Prostate enlargement    Rheumatic fever    Squamous cell carcinoma       Past Surgical History:  Past Surgical History:   Procedure Laterality Date    Cabg      Cardiac valve surgery      Other surgical history      sqaumos cell cancer removed    Valve replacement      pig valve       Family History:  No first-degree relative with a history of colorectal cancer.    Social History:  No IVDU      Medications:    [COMPLETED] pantoprazole (Protonix) 40 mg in sodium chloride 0.9% PF 10 mL IV push  40 mg Intravenous Once    [COMPLETED] sodium chloride 0.9 % IV bolus 500 mL  500 mL Intravenous Once     Followed by    sodium chloride 0.9% infusion   Intravenous Continuous    [COMPLETED] cefTRIAXone (Rocephin) 2 g in sodium chloride 0.9% 100 mL IVPB-ADDV  2 g Intravenous Once    [COMPLETED] azithromycin (Zithromax) 500 mg in sodium chloride 0.9% 250mL IVPB premix  500 mg Intravenous Once    sterile water for injection (PF) injection        levothyroxine (Synthroid) tab 50 mcg  50 mcg Per G Tube Before breakfast    atorvastatin (Lipitor) tab 10 mg  10 mg Oral Nightly    melatonin tab 3 mg  3 mg Oral Nightly PRN    ondansetron (Zofran) 4 MG/2ML injection 4 mg  4 mg Intravenous Q6H PRN    metoclopramide (Reglan) 5 mg/mL injection 5 mg  5 mg Intravenous Q8H PRN    acetaminophen (Tylenol Extra Strength) tab 500 mg  500 mg Oral Q4H PRN    [COMPLETED] acetaminophen (Tylenol) 160 MG/5ML oral liquid 650 mg  650 mg Oral Once    [COMPLETED] sodium chloride 0.9 % IV bolus 1,205 mL  1,205 mL Intravenous Once    piperacillin-tazobactam (Zosyn) 3.375 g in dextrose 5% 100 mL IVPB-ADDV  3.375 g Intravenous Q8H    [START ON 10/28/2024] azithromycin (Zithromax) 500 mg in sodium chloride 0.9% 250mL IVPB premix  500 mg Intravenous Q24H    [COMPLETED] sodium chloride 0.9% infusion   Intravenous Once    ticagrelor (Brilinta) tab 90 mg  90 mg Per G Tube Q12H    pantoprazole (Protonix) 40 mg in sodium chloride 0.9% PF 10 mL IV push  40 mg Intravenous Q12H       Allergies:  Allergies   Allergen Reactions    Compazine [Prochlorperazine] HALLUCINATION       Review of Systems  Negative unless otherwise mentioned in HPI.     Physical Exam  BP 94/60   Pulse 109   Temp 98.4 °F (36.9 °C) (Temporal)   Resp 26   Ht 5' 10\" (1.778 m)   Wt 142 lb 6.7 oz (64.6 kg)   SpO2 93%   BMI 20.43 kg/m²   Body mass index is 20.43 kg/m².      Resting comfortably in bed.   Gen: Awake and alert, NAD  Eyes:  no scleral icterus  Cardiovascular: Warm, well-perfused  Respiratory: No respiratory distress  Abd: Soft, non-tender, non-distended. No rebound tenderness,  no guarding. PEG tube in place with bumper initially at 8 cm.  This was tightened snug to the skin at 2 cm  Neuro:  Alert and oriented to name, location and time.     Diagnostic Data:      Labs:  Recent Labs   Lab 10/21/24  1131 10/27/24  0339 10/27/24  1014   WBC 5.4 10.1  --    HGB 8.2* 6.4* 6.2*   MCV 98.4 99.0  --    .0 267.0  --    INR  --  1.33*  --        Recent Labs   Lab 10/27/24  0340   *   BUN 58*   CREATSERUM 1.27   CA 9.2   ALB 3.7   *   K 3.6   CL 94*   CO2 30.0   ALKPHO 127*   AST 38*   ALT 19   BILT 1.1   TP 6.6       Recent Labs   Lab 10/27/24  0339   PTP 16.7*   INR 1.33*              Imaging: Imaging data reviewed in Epic.    Medications:    sterile water for injection (PF)        levothyroxine  50 mcg Per G Tube Before breakfast    atorvastatin  10 mg Oral Nightly    piperacillin-tazobactam  3.375 g Intravenous Q8H    [START ON 10/28/2024] azithromycin  500 mg Intravenous Q24H    ticagrelor  90 mg Per G Tube Q12H    pantoprazole  40 mg Intravenous Q12H       Allergies:  Allergies   Allergen Reactions    Compazine [Prochlorperazine] HALLUCINATION       Imaging:  I have personally reviewed all pertinent available imaging.       ASSESSMENT / PLAN:     Wang Romero is a 78 year old male with Chronic medical conditions including head and neck cancer status post radiation therapy complicated by dysphagia with G-tube placement.  Presented with dyspnea and found to have anemia along with melena.  We were consulted for this reason.    Acute blood loss anemia-will need to rule out upper GI bleed.  He does have an elevated BUN to creatinine ratio.  Hemoglobin on admission was 6.2 with increased only to 6.4 after 1 unit.  He is currently receiving 1 more unit PRBC.  Melena-May need to rule out upper GI bleed.  He did also have a nosebleed previously which may be contributing.  This is all likely exacerbated in the setting of Brilinta use due to recent carotid artery stent  placement.  Sepsis-noted to have fevers on admission along with hypotension.  Thought to be due to pneumonia.  History of head and neck cancer status postradiation therapy      Recommendations:   Continue to hold tube feeds  Pantoprazole 40 mg IV twice daily  Complete transfusion of 1 unit PRBC now and recheck hemoglobin 2 hours posttransfusion. Transfuse for hemoglobin less than 7  Ideally would hold Brilinta in the setting of suspected upper GI bleed, however given his recent carotid stent placement, okay to continue as needed as he will be high risk for stroke off of the Brilinta  Aspirate G-tube to assess for any bleeding  Management of sepsis per hospitalist and ICU team  Patient may need endoscopic evaluation with EGD pending clinical course.  Ideally would be 24 hours fever-free prior to this.  Continue to monitor hemoglobin and transfuse anymore less than 7  Thank you for the consultation.  Will continue to follow along with you.    Thank you for the consult and allowing us to participate in patient's care.  GI will follow. Please page with any questions or concerns.     Brannon Wilson, DO  10/27/2024  [facility]   [Cow's milk (Ounces per day ___)] : consumes [unfilled] oz of Cow's milk per day [Mother] : mother [Fruit] : fruit [Vegetables] : vegetables [Meat] : meat [Eggs] : eggs [Baby food] : baby food [Finger Foods] : finger foods [Table food] : table food [Dairy] : dairy [Normal] : Normal [In bed] : In bed [Sippy cup use] : Sippy cup use [Brushing teeth] : Brushing teeth [None] : Primary Fluoride Source: None [Playtime 60 min a day] : Playtime 60 min a day [Temper Tantrums] : Temper Tantrums [Toilet Training] : Toilet training [<2 hrs of screen time] : Less than 2 hrs of screen time [No] : Not at  exposure [Water heater temperature set at <120 degrees F] : Water heater temperature set at <120 degrees F [Car seat in back seat] : Car seat in back seat [Smoke Detectors] : Smoke detectors [Carbon Monoxide Detectors] : Carbon monoxide detectors [Up to date] : Up to date [Gun in Home] : No gun in home [Exposure to electronic nicotine delivery system] : No exposure to electronic nicotine delivery system [At risk for exposure to TB] : Not at risk for exposure to Tuberculosis [de-identified] : picky eater [de-identified] : still uses bottles [de-identified] : referred to dentist today

## 2024-10-27 NOTE — CONSULTS
[place]    Wang Romero Patient Status:  Emergency    1945 MRN [id number]   Location [facility] EMERGENCY DEPARTMENT Attending Nneka Ho MD   Hosp Day # 0 PCP Colette Steele MD     Date of Admission: 10/27/2024  Admission Diagnosis: PNA, sepsis, GI bleed     History of Present Illness: 77 y/o with h/o H&N CA s/p RT, mechanical aortic and mitral valve replacement on chronic anticoagulation, carotid stenosis s/p 2 stent placement on 10/15/24 who subsequently has visited the ER 3 times in the last week; developed hematuria when anticoagulation was resumed; seen in ER and noted to have cystitis- sent home on abx. Pt returned after Gtube migrated out which was initially placed bc of severe dysphagia following radiation for squamous cell cancer of the throat; this was replaced and sent home.  Pt returned again last night with c/o increasing SOB, weakness, cough and also melena.  - while in ER, noted to have fever and transient hypotension prompting ICU evaluation.  Noted to be significantly anemic.       Past Medical History:    Arrhythmia    Cancer (HCC)    sqaumos cell     Cataract    Exposure to medical diagnostic radiation    Heart disease    Heart valve disease    Valve replaced in     High cholesterol    Hypothyroidism    Prostate enlargement    Rheumatic fever    Squamous cell carcinoma      Past Surgical History:   Procedure Laterality Date    Cabg      Cardiac valve surgery      Other surgical history      sqaumos cell cancer removed    Valve replacement      pig valve         Allergies[1]     Social History:   reports that he quit smoking about 18 years ago. His smoking use included cigarettes. He has quit using smokeless tobacco. He reports current alcohol use. He reports that he does not use drugs.      Family History:  Reviewed. noncontributory     Home Medications:  Reviewed. Per EMR     Current Medications:    Current Facility-Administered Medications:      [COMPLETED] sodium chloride 0.9 % IV bolus 500 mL, 500 mL, Intravenous, Once **FOLLOWED BY** sodium chloride 0.9% infusion, , Intravenous, Continuous    sterile water for injection (PF) injection, , ,     sodium chloride 0.9 % IV bolus 1,205 mL, 1,205 mL, Intravenous, Once     REVIEW OF SYSTEMS:   As listed in HPI, otherwise ten point ROS is negative.     OBJECTIVE:  /71   Pulse 91   Temp (!) 101.1 °F (38.4 °C) (Temporal)   Resp (!) 34   Ht 177.8 cm (5' 10\")   Wt 125 lb (56.7 kg)   SpO2 99%   BMI 17.94 kg/m²            Wt Readings from Last 3 Encounters:   10/27/24 125 lb (56.7 kg)   10/23/24 130 lb (59 kg)   10/22/24 130 lb (59 kg)        I/O last 3 completed shifts:  In: 850 [IV PIGGYBACK:850]  Out: -   No intake/output data recorded.     General appearance: alert, appears stated age, and cooperative  Head: Normocephalic, without obvious abnormality, atraumatic  Neck: no adenopathy, no carotid bruit, and supple, symmetrical, trachea midline  Back: symmetric, no curvature. ROM normal. No CVA tenderness.  Lungs: diminished breath sounds bilaterally  Chest wall: no tenderness  Heart: irregularly irregular rhythm  Abdomen: soft, non-tender; bowel sounds normal; no masses,  no organomegaly  Extremities: edema +1-2  Pulses: 2+ and symmetric  Skin: Skin color, texture, turgor normal. No rashes or lesions     Lab Results   Component Value Date    WBC 10.1 10/27/2024    RBC 1.96 10/27/2024    HGB 6.4 10/27/2024    HCT 19.4 10/27/2024    MCV 99.0 10/27/2024    MCH 32.7 10/27/2024    MCHC 33.0 10/27/2024    RDW 13.6 10/27/2024    .0 10/27/2024     Lab Results   Component Value Date     10/27/2024    K 3.6 10/27/2024    CL 94 10/27/2024    CO2 30.0 10/27/2024    BUN 58 10/27/2024    CREATSERUM 1.27 10/27/2024     10/27/2024    CA 9.2 10/27/2024    ALKPHO 127 10/27/2024    ALT 19 10/27/2024    AST 38 10/27/2024    BILT 1.1 10/27/2024    ALB 3.7 10/27/2024    TP 6.6 10/27/2024     Lab Results    Component Value Date    PT 22.0 (H) 01/08/2013    PT 19.0 (H) 01/07/2013    PT 25.0 (H) 01/06/2013    INR 1.33 (H) 10/27/2024    INR 1.41 (H) 10/11/2024    INR 1.28 (H) 12/03/2020          Imaging: CXR: large L lung consolidation c/w PNA     ASSESSMENT/PLAN:  Septic shock- due to large CARLOS PNA  - IVF resuscitation  - initial lactate wnl  - vasopressor support prn  - cultures pending  CARLOS PNA- at risk for aspiration given h/o severe dysphagia  - transition to zosyn for better coverage and complete azithro x3d  - sputum cx if able  GI bleed- sig melena and drop in Hgb  - GI following  - IV PPI  - serial Hgb  Dysphagia- strict NPO.  Due to prior h/o H&N CA and subsequent radiation  - has PEG  Acute on chronic anemia- acute component due to GI loss  - serial Hgb q8hrs  - transfuse for Hgb>7 or active bleeding  SMOOTH- likely prerenal in nature.  Due to low BP, dehydration and GI bleed  - IVF resuscitation  H/o carotid stenosis- s/p stenting  - per PV, Dr. Jones consulted  - hold anticoagulants given GI bleed  F/E/N- NPO, hyponatremia being corrected.  TFs on hold bc of GI bleed  Proph - SCDs  Dispo- [facility] select- did confirm with pt and wife  - ICU transfer; will follow closely    Critical Care Time: 45 minutes    Ruy Young MD  10/27/2024  7:18 AM          [1]   Allergies  Allergen Reactions    Compazine [Prochlorperazine] HALLUCINATION

## 2024-10-28 NOTE — PHYSICAL THERAPY NOTE
PHYSICAL THERAPY EVALUATION - INPATIENT     Room Number: 457/457-A  Evaluation Date: 10/28/2024  Type of Evaluation: Initial  Physician Order: PT Eval and Treat       Co-Morbidities : s/p TCAR 10/15/24, A-fib, SAH, throat CA  Reason for Therapy: Mobility Dysfunction and Discharge Planning    PHYSICAL THERAPY ASSESSMENT   Patient is a 78 year old male admitted 10/27/2024 for PNA, sepsis.  Prior to admission, patient's baseline is independent.  Patient is currently functioning below baseline with bed mobility, transfers, gait, and stair negotiation.  Patient is requiring contact guard assist as a result of the following impairments: decreased functional strength, decreased endurance/aerobic capacity, decreased muscular endurance, medical status, and increased O2 needs from baseline.  Physical Therapy will continue to follow for duration of hospitalization.    Patient will benefit from continued skilled PT Services at discharge to promote prior level of function.  Anticipate patient will return home with home health PT.    PLAN DURING HOSPITALIZATION  Nursing Mobility Recommendation : 1 Assist  PT Device Recommendation: Rolling walker  PT Treatment Plan: Endurance;Energy conservation;Patient education;Gait training;Strengthening;Stair training;Transfer training;Balance training;Bed mobility  Rehab Potential : Good  Frequency (Obs): 3-5x/week     CURRENT GOALS    Goal #1 Patient is able to demonstrate supine - sit EOB @ level: modified independent     Goal #2 Patient is able to demonstrate transfers EOB to/from Chair/Wheelchair at assistance level: modified independent     Goal #3 Patient is able to ambulate 150 feet with assist device:  least restrictive assistive device  at assistance level: supervision     Goal #4 Pt will negotiate one flight of stairs with railing and supervision   Goal #5    Goal #6    Goal Comments: Goals established on 10/28/2024      PHYSICAL THERAPY MEDICAL/SOCIAL HISTORY  History related to  current admission: Patient is a 78 year old male admitted on 10/27/2024 from home for increased difficulty with breathing, cough.  Pt diagnosed with PNA, sepis.    Recent admissions:   ED 10/24/24 wish disloged G-tube  ED 10/22/24 with hematuria  10/15- for TCAR with dc to home    HOME SITUATION  Type of Home: House  Home Layout: Two level;Able to live on main level (must go upstairs to shower)                     Lives With: Spouse    Drives: Yes   Patient Regularly Uses: None      Prior Level of [place]: Pt reports typically ind without use of assistive device. Ambulates 2 miles per day, spouse ambulates 5 miles/day. Pt denies recent falls.    SUBJECTIVE  \"I was doing good when I left, but now I keep having problems.\"    OBJECTIVE  Precautions: Bed/chair alarm (G tube)  Fall Risk: Standard fall risk    WEIGHT BEARING RESTRICTION     PAIN ASSESSMENT  Ratin          COGNITION  Overall Cognitive Status:  WFL - within functional limits    RANGE OF MOTION AND STRENGTH ASSESSMENT  Upper extremity ROM and strength are within functional limits     Lower extremity ROM is within functional limits     Lower extremity strength is within functional limits     BALANCE  Static Sitting: Good  Dynamic Sitting: Good  Static Standing: Fair  Dynamic Standing: Fair -    ADDITIONAL TESTS  Additional Tests: Other (Comment) (5/10 Modified ADAM)      MODIFIED ADAM DYSPNEA SCALE - (SHORTNESS OF BREATH SCALE)    SCORE DESCRIPTION   0 Nothing at all   1 Very slight   2 Slight   3 Moderate   4 Somewhat severe   5 Strong or hard breathing   6    7 Very hard breathing   8    9 Very, Very Severe   10 MAX - You need to stop     Patient Education provided:    Use this scale to rate the difficulty of your breathing:  - As you would rate your pain from 0-10, you can rate your SOB from 0-10  - Goal is to stay below 7/10 with activity  - If you reach beyond 7/10, it is important to rest; stop activity and if you need to sit down to  recover                             ACTIVITY TOLERANCE  Pulse: 116  Heart Rate Source: Monitor                   O2 WALK  Oxygen Therapy  SPO2% on Room Air at Rest: 88  SPO2% on Oxygen at Rest: 97  At rest oxygen flow (liters per minute): 2  SPO2% Ambulation on Oxygen: 87 (unreliable pleth)  Ambulation oxygen flow (liters per minute): 2    NEUROLOGICAL FINDINGS                        AM-PAC '6-Clicks' INPATIENT SHORT FORM - BASIC MOBILITY  How much difficulty does the patient currently have...  Patient Difficulty: Turning over in bed (including adjusting bedclothes, sheets and blankets)?: None   Patient Difficulty: Sitting down on and standing up from a chair with arms (e.g., wheelchair, bedside commode, etc.): None   Patient Difficulty: Moving from lying on back to sitting on the side of the bed?: None   How much help from another person does the patient currently need...   Help from Another: Moving to and from a bed to a chair (including a wheelchair)?: A Little   Help from Another: Need to walk in hospital room?: A Little   Help from Another: Climbing 3-5 steps with a railing?: A Little     AM-PAC Score:  Raw Score: 21   Approx Degree of Impairment: 28.97%   Standardized Score (AM-PAC Scale): 50.25   CMS Modifier (G-Code): CJ    FUNCTIONAL ABILITY STATUS  Gait Assessment   Functional Mobility/Gait Assessment  Gait Assistance: Contact guard assist  Distance (ft): 5  Assistive Device: Rolling walker  Pattern: Within Functional Limits    Skilled Therapy Provided     Bed Mobility:  Rolling: NT  Supine to sit: sba   Sit to supine: sba     Transfer Mobility:  Sit to stand: sba   Stand to sit: sba  Gait = cga, no LOB Noted    Therapist's Comments: Pt presents semi-reclined in bed, agreeable to PT. RN approval for session noted. Pt completes mobility as above. Cueing for pacing, initial trial sans O2, however pt continues to desat, RN present and placed O2 at 2 l/min, limited gait distance due to increased SOB and  decreasing sats. Pt left up in chair, RN aware of position and recs. Currently rec walker for energy conservation for longer gait distances.     Exercise/Education Provided:  Bed mobility  Energy conservation  Functional activity tolerated  Gait training  Posture  Transfer training    Patient End of Session: Up in chair;Needs met;Call light within reach;RN aware of session/findings;All patient questions and concerns addressed;Hospital anti-slip socks;Alarm set      Patient Evaluation Complexity Level:  History Moderate - 1 or 2 personal factors and/or co-morbidities   Examination of body systems Low -  addressing 1-2 elements   Clinical Presentation Low- Stable   Clinical Decision Making Low Complexity       PT Session Time: 25 minutes    Therapeutic Activity: 10 minutes

## 2024-10-28 NOTE — CM/SW NOTE
10/28/24 1700   CM/SW Referral Data   Referral Source    Reason for Referral Discharge planning   Informant EMR     HOME SITUATION  Type of Home: House  Home Layout: Two level;Able to live on main level (must go upstairs to shower)       Lives With: Spouse    Drives: Yes   Patient Regularly Uses: None       Prior Level of [place]: Pt reports typically ind without use of assistive device. Ambulates 2 miles per day, spouse ambulates 5 miles/day. Pt denies recent falls.    PT=HH. Referral sent via [facility]. Orders and F2F complete. Will need to present HH choice when list available.     Balaji Dale, MSN RN, CM  X [phone]

## 2024-10-28 NOTE — DIETARY MALNUTRITION NOTE
[facility]   part of [facility]  NUTRITION ASSESSMENT    Pt meets severe malnutrition criteria at this time.    CRITERIA FOR MALNUTRITION DIAGNOSIS:  Criteria for severe malnutrition diagnosis: chronic illness related to body fat severe depletion and muscle mass severe depletion    NUTRITION INTERVENTION:    RD nutrition Care Plan- See RD nutrition assessment for additional recommendations  Enteral Nutrition - Via PEG, recommend bolus TF: Boost Very High Calorie - 3.5 cartons/day (1.5 carton @ 0800, 1 carton @ 1300, 1 carton @ 1800 or per pt preference).   This will provide 1855 kcal, 77 grams protein, 556 ml total free water.   Recommend 200 ml water flush before and after each bolus, TF+FWF provides 1756 ml total fluids.   Coordination of Nutrition Care - Recommend SLP consult prior to diet advancement. and Palliative care consult for goal of care    PATIENT STATUS: 78 year old male admitted on 10/27 presents with CAP. Pt screened d/t consult for TF recs. Visited pt at bedside with wife present. Wife provided most of the history. She reports pt is NPO except for TF. He is prescribed 5 Boost Very High Calorie cartons/day but pt realistically is only taking 3/day, sometimes 4. Pt is only willing to receive 3 cartons Boost VHC daily during admit. Pt denies any GI symptoms recently with TF. Wife reports pt's wt has fluctuates between 125-135 lbs lately. Pt with notable signs of muscle and fat loss. Per wife, pt has been on Boost VHC since [facility] when there was a formula shortage and pt's Otolaryngologist at [facility] is who is ordering TF formula for pt. Wife states that this MD is who follows him yearly and does NOT follow RD although RD is available through [facility]. Highly encouraged pt and wife follow up with RD outpatient. All questions answered at this time.    PMH: Head and neck Cancer s/p PEG, Cataract, High cholesterol, Hypothyroidism, Prostate enlargement, Rheumatic fever, and Squamous cell  carcinoma.    ANTHROPOMETRICS:  Ht: 177.8 cm (5' 10\")  Wt: 63.5 kg (139 lb 15.9 oz).   BMI: Body mass index is 20.09 kg/m².  IBW: 75.5 kg    WEIGHT HISTORY:   Patient Weight(s) for the past 336 hrs:   Weight   10/28/24 0600 63.5 kg (139 lb 15.9 oz)   10/27/24 0909 64.6 kg (142 lb 6.7 oz)   10/27/24 0301 56.7 kg (125 lb)       Wt Readings from Last 10 Encounters:   10/28/24 63.5 kg (139 lb 15.9 oz)   10/23/24 59 kg (130 lb)   10/22/24 59 kg (130 lb)   10/17/24 61 kg (134 lb 7.7 oz)   04/06/22 64.4 kg (142 lb)   03/23/22 65.8 kg (145 lb)   12/06/20 61.8 kg (136 lb 3.9 oz)   11/13/19 61.3 kg (135 lb 2.3 oz)   11/12/19 65.8 kg (145 lb)   08/08/19 64.6 kg (142 lb 6.7 oz)        NUTRITION:  Diet:       Procedures    NPO      Food Allergies: No  Cultural/Ethnic/Adventist Preferences Addressed: Yes    Percent Meals Eaten (last 3 days)       None            GI SYSTEM REVIEW:  +PEG  Skin/Wounds: WNL    NUTRITION RELATED PHYSICAL FINDINGS:     1. Body Fat/Muscle Mass: severe depletion body fat Buccal fat pad and Triceps and severe muscle depletion Temple region, Clavicle region, Thigh region, and Calf region     2. Fluid Accumulation: none per RN documentation    NUTRITION PRESCRIPTION:  59 kg Actual Body Weight  Calories: 3878-1924 calories/day (30-35 kcal/kg)  Protein: 71-89 grams protein/day (1.2-1.5 gm/kg)  Fluid: ~1 ml/kcal or per MD discretion    NUTRITION DIAGNOSIS/PROBLEM:  Malnutrition related to physiological causes as evidenced by loss of fat mass and loss of muscle mass    MONITOR AND EVALUATE/NUTRITION GOALS:  Weight stable within 1 to 2 lbs during admission - New  Start alternative nutrition in 24-48 hrs if diet is not able to advance- New  Tolerate alternative nutrition at 100% of goal - New      MEDICATIONS:  Abx, synthroid, protonix    LABS:  Reviewed     Pt is at High nutrition risk    Zulma Gonzalez RD, LDN, CNSC  Clinical Dietitian  Spectra: [phone]

## 2024-10-28 NOTE — CONSULTS
[facility]    PATIENT'S NAME: DA BISHOP   ATTENDING PHYSICIAN: Hema Crouch D.O.   CONSULTING PHYSICIAN: Da Jones M.D.   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  Although he had no CVA, TIA, visual field defect, or aphasia, but because of progressively worsening up to 95% to 99% blockage, I discussed with the patient and his wife and they elected they wanted to have something done.  Due to his extensive amount of scarring in the right neck from the radiation, it was recommended to do a TCAR procedure to try to give the best chance to heal.  The procedure, risks, and complications were explained to the patient prior to procedure.  He underwent the TCAR procedure with myself and Dr. Brooks on October 15.  He had micropuncture of the right common carotid artery, severe stenosis as the sheath was placed initially with a wire up the external carotid artery and then directed into the internal carotid artery with a 5 x 40 mm balloon.  Angioplasty was done under reverse flow and then a tapered [address] stent of 9 x 7 x 40 mm was placed distally with an overlap of 9 x 30 mm proximally.  He tolerated the procedure well.  Neurologically, he is intact.  Wounds are clean and dry.  He had some problems with his hemoglobin.  He had been on Eliquis before for atrial fibrillation and this had been held when he was discharged.  He was supposed to be going home on aspirin and Plavix.  He came back in through the emergency  room on 10/27/2024 with shortness of breath, generalized weakness, and a right upper lobe pneumonia.  The patient also has significant anemia.  His hemoglobin was down to 6.2.  He has had melanotic stools.  The patient was seen by pulmonary/ICU specialist initially by Dr. Young.  He corrected the hemoglobin with transfusion, and he is on proton-pump inhibitor.  Possible endoscopy being scheduled by Dr. Wilson.  The service had wondered if we could stop the Brilinta 90 mg b.i.d. since he was resistant to Plavix but still was off his Eliquis for his atrial fibrillation and told him, if it is possible, they could keep him on that because they were worried about the stent clotting off as it has recently been placed.  He has no complaints of any neurological symptoms.  He says he is not short of breath.  He has no abdominal pain, no back pain, no lower extremity ischemic symptoms.  He denies any hematuria.  He has got no blood in his stool but he has some melanotic stool.  No hemoptysis, cough.      PAST MEDICAL/SURGICAL HISTORY:  Initial surgery, he had open heart surgery with valve replacement by Dr. Miller of the mitral valve in the past.  He has a feeding tube placed for difficulty with swallowing and aspiration pneumonia in the past.  He has had some weight loss.  He is being followed by Dr. Colette Steele and Dr. Terry Chamberlain.  His P2Y12 level before was 221.    MEDICATIONS:  He has been on levothyroxine but he was on previously apixaban 5 mg b.i.d., simvastatin, prednisolone.      ALLERGIES:  The patient has no known allergies.      SOCIAL HISTORY:  He denies any EtOH abuse, drug abuse, tobacco abuse.  He quit smoking in .  He used to be an .  He is  but no children.      FAMILY HISTORY:  Mother  from Alzheimer disease.  Father  from myocardial infarction.     PHYSICAL EXAMINATION:    GENERAL:  He is awake, alert.  He is appropriate.    VITAL SIGNS:  His saturations are 99% to  100%.  Pulse 90 to 106.  Blood pressure stable, 122/70.  LUNGS:  Breath sounds bilaterally.  ABDOMEN:  Soft, thin.  No tenderness.  Gastrostomy feeding tube noted.  EXTREMITIES:  Femoral pulses are palpable.  Popliteal and pedal may be slightly diminished.  Upper extremity pulses palpable.   NEUROLOGIC:  He is moving all 4 extremities.  He has some difficulty communicating due to the throat cancer and the radiation.    ASSESSMENT AND PLAN:  Discussed with the intensivist on the floor as well as his nurse.  He is on Brilinta 90 mg b.i.d., although I do not see it in the chart, and we will continue monitoring hemoglobin, hematocrit, and his overall care.  If he does well, then can keep on Brilinta.  Just worry about not being on anything in that the stents could thrombose.  Workup for the anemia per GI at this time and will try to discuss later on with the patient's wife.  Otherwise, all questions answered.  We will consult with Dr. Chamberlain from Cardiology so that they are involved in his care.  All questions answered for the patient.    Dictated By Wang Jones M.D.  d: 10/28/2024 09:15:16  t: 10/28/2024 13:00:05  Job [id number]  JJW/    cc: Wang Jones M.D.     Name band;

## 2024-10-28 NOTE — PROGRESS NOTES
Gastroenterology Progress Note  Patient Name: Wang Romero  Chief Complaint: Melena  S: Pt denies abdominal pain. Per nursing, he has not had further melena or overt bleeding from G tube.   O: /63   Pulse 116   Temp 99.2 °F (37.3 °C) (Temporal)   Resp 23   Ht 5' 10\" (1.778 m)   Wt 139 lb 15.9 oz (63.5 kg)   SpO2 96%   BMI 20.09 kg/m²   Gen: Awake and alert  CV: tachycardic  Resp: decreased BS  Abd: (+)BS, soft, non-tender, non-distended; no rebound or guarding; G tube intact  Ext: No edema or cyanosis  Skin: Warm and dry  Laboratory Data:   Lab Results   Component Value Date    WBC 6.1 10/28/2024    HGB 8.0 10/28/2024    HCT 24.2 10/28/2024    .0 10/28/2024    CREATSERUM 1.01 10/28/2024    BUN 37 10/28/2024     10/28/2024    K 3.6 10/28/2024     10/28/2024    CO2 29.0 10/28/2024     10/28/2024    CA 9.3 10/28/2024    INR 1.56 10/28/2024    PTP 18.8 10/28/2024     Recent Labs   Lab 10/27/24  0340 10/28/24  0428   * 109*   BUN 58* 37*   CREATSERUM 1.27 1.01   CA 9.2 9.3   * 138   K 3.6 3.6   CL 94* 104   CO2 30.0 29.0     Recent Labs   Lab 10/28/24  0428   RBC 2.61*   HGB 8.0*   HCT 24.2*   MCV 92.7   MCH 30.7   MCHC 33.1   RDW 16.1   NEPRELIM 5.06   WBC 6.1   .0       Recent Labs   Lab 10/27/24  0340   ALT 19   AST 38*       Assessment:   Wang Romero is a 78 year old male with Chronic medical conditions including head and neck cancer status post radiation therapy complicated by dysphagia with G-tube placement.  Presented with dyspnea and found to have anemia along with melena.  We were consulted for this reason.     Acute blood loss anemia-could be due to nose bleed vs upper GI etiology such as PUD, esophagitis, etc  Melena-May need to rule out upper GI bleed.  He did also have a nosebleed previously which may be contributing.  This is all likely exacerbated in the setting of Brilinta use due to recent carotid artery stent  placement.  Sepsis-noted to have fevers on admission along with hypotension.  Thought to be due to pneumonia.  History of head and neck cancer status postradiation therapy  Plan:   Pantoprazole 40 mg IV twice daily  Monitor Hgb and transfuse for hemoglobin less than 7  Ideally would hold Brilinta in the setting of suspected upper GI bleed, however given his recent carotid stent placement, okay to continue as needed as he will be high risk for stroke off of the Brilinta  Will consider EGD for signs of brisk GI bleeding. Continue to monitor for overt bleeding. Of note, pt did not appear to be agreeable to EGD today.     Tila Ryder DO  11:42 AM  10/28/2024  [facility]  [phone]

## 2024-10-28 NOTE — PROGRESS NOTES
[facility]   part of [facility]     Hospitalist Progress Note     Wang Romero Patient Status:  Inpatient    1945 MRN [id number]   Location [facility] 4SW-A Attending Hema Crouch*   Hosp Day # 1 PCP Colette Steele MD     Chief Complaint: SOB, cough    Subjective:     Patient doing well, no new complaints, no pressors.    Objective:    Review of Systems:   A comprehensive review of systems was completed; pertinent positive and negatives stated in subjective.    Vital signs:  Temp:  [97.8 °F (36.6 °C)-98.9 °F (37.2 °C)] 98.2 °F (36.8 °C)  Pulse:  [] 101  Resp:  [21-40] 28  BP: ()/(55-93) 116/61  SpO2:  [90 %-97 %] 96 %    Physical Exam:    General: No acute distress  Respiratory: No wheezes, no rhonchi  Cardiovascular: S1, S2, regular rate and rhythm  Abdomen: Soft, Non-tender, non-distended, positive bowel sounds  Neuro: No new focal deficits.   Extremities: No edema      Diagnostic Data:    Labs:  Recent Labs   Lab 10/21/24  1131 10/27/24  0339 10/27/24  1014 10/27/24  1548 10/28/24  0427 10/28/24  0428   WBC 5.4 10.1  --   --   --  6.1   HGB 8.2* 6.4* 6.2* 7.7* 8.0* 8.0*   MCV 98.4 99.0  --   --   --  92.7   .0 267.0  --   --   --  213.0   INR  --  1.33*  --   --   --   --        Recent Labs   Lab 10/27/24  0340 10/28/24  042   * 109*   BUN 58* 37*   CREATSERUM 1.27 1.01   CA 9.2 9.3   ALB 3.7  --    * 138   K 3.6 3.6   CL 94* 104   CO2 30.0 29.0   ALKPHO 127*  --    AST 38*  --    ALT 19  --    BILT 1.1  --    TP 6.6  --        Estimated Creatinine Clearance: 52.4 mL/min (based on SCr of 1.01 mg/dL).    Recent Labs   Lab 10/27/24  0340   TROPHS 36       Recent Labs   Lab 10/27/24  0339   PTP 16.7*   INR 1.33*                  Microbiology    No results found for this visit on 10/27/24.      Imaging: Reviewed in Epic.    Medications:    levothyroxine  50 mcg Per G Tube Before breakfast    atorvastatin  10 mg Oral Nightly     piperacillin-tazobactam  3.375 g Intravenous Q8H    azithromycin  500 mg Intravenous Q24H    ticagrelor  90 mg Per G Tube Q12H    pantoprazole  40 mg Intravenous Q12H       Assessment & Plan:      # Septic shock  - Fever, tachycardia, tachypnea, pneumonia, hypotension  - IVF  - IV abx  - f/u cultures     # CARLOS Pneumonia  - Will continue on ceftriaxone and azithromycin  - cultures pending     # GI bleed  - Will continue on IV ppi BID  - GI following, EGD once fever free for 24 hrs  - no further bleeding tube feeds on hold     # Profound anemia due to ABLA  -secondary to GI bleed   Hgb improved after PRBC and stable overnight     # Carotid artery disease  - recent stents placed  - Will hold ASA and ticangralor with GI bleed.  - Will contibue on statin therapy.     # Hypothyroidism  - Will continue on levothyroxine.    # H/o head and neck cancer with dysphagia s/p PEG    # SMOOTH, resolved          Sharon Pabon MD    Supplementary Documentation:     Quality:  DVT Mechanical Prophylaxis:   SCDs,    DVT Pharmacologic Prophylaxis   Medication   None                Code Status: DNAR/Selective Treatment  Gaitan: No urinary catheter in place  Gaitan Duration (in days):   Central line:    PRISCILA:     Discharge is dependent on: progress  At this point Mr. Romero is expected to be discharge to: TBD    The 21st Century Cures Act makes medical notes like these available to patients in the interest of transparency. Please be advised this is a medical document. Medical documents are intended to carry relevant information, facts as evident, and the clinical opinion of the practitioner. The medical note is intended as peer to peer communication and may appear blunt or direct. It is written in medical language and may contain abbreviations or verbiage that are unfamiliar.              **Certification      PHYSICIAN Certification of Need for Inpatient Hospitalization - Initial Certification    Patient will require inpatient services that  will reasonably be expected to span two midnight's based on the clinical documentation in H+P.   Based on patients current state of illness, I anticipate that, after discharge, patient will require TBD.

## 2024-10-28 NOTE — OCCUPATIONAL THERAPY NOTE
OCCUPATIONAL THERAPY EVALUATION - INPATIENT     Room Number: 457/457-A  Evaluation Date: 10/28/2024  Type of Evaluation: Initial  Presenting Problem: septic shock, GIB, CAP    Physician Order: IP Consult to Occupational Therapy  Reason for Therapy: ADL/IADL Dysfunction and Discharge Planning    OCCUPATIONAL THERAPY ASSESSMENT   Patient is currently functioning below baseline with toileting, upper body dressing, lower body dressing, transfers, energy conservation strategies, and aerobic capacity. Prior to admission, patient's baseline is independent.  Patient is requiring minimum assistance as a result of the following impairments: decreased endurance, impaired standing balance, and increased O2 needs from baseline. Occupational Therapy will continue to follow for duration of hospitalization.    Patient will benefit from continued skilled OT Services at discharge to promote prior level of function.  Anticipate patient will return home with home health OT    History Related to Current Admission: Patient is a 78 year old male admitted on 10/27/2024 with Presenting Problem: septic shock, GIB, CAP. Co-Morbidities : h/o head/neck cancer with RT and resection;dysphagia, G tube, CAD c stenting, AVR, AF, h/o SAH 2019, recent cystitis    WEIGHT BEARING RESTRICTION   none    Recommendations for nursing staff:   Transfers: 1 person, RW  Toileting location: bedside commode   EVALUATION SESSION:  Patient Start of Session: supine    FUNCTIONAL TRANSFER ASSESSMENT  Sit to Stand: Edge of Bed  Edge of Bed: Contact Guard Assist    BED MOBILITY  Supine to Sit : Supervision    BALANCE ASSESSMENT  Static Standing: Stand-by Assist    FUNCTIONAL ADL ASSESSMENT     ACTIVITY TOLERANCE: On room air at start of session. Desaturating to 88-90% with talking while in supine. RN re-applied O2 to 2L. Saturations then were between %. HR spiked to 149 briefly during activity  ( atrial fibrillation).  Rated work of breathing 5/10 after bed to  chair transfer.    Pulse: 114  Heart Rate Source: Monitor                   O2 SATURATIONS  Oxygen Therapy  SPO2% on Oxygen at Rest: 99  At rest oxygen flow (liters per minute): 2    COGNITION  Overall Cognitive Status:  WFL - within functional limits and appears to be at or near baseline  Not formally tested    Upper Extremity   ROM: within functional limits   Strength: within functional limits grossly 5/5  Coordination  Gross motor: wfl  Fine motor: wfl  Sensation:  denied deficits    EDUCATION PROVIDED  Patient Education : Role of Occupational Therapy; Plan of Care; Functional Transfer Techniques; Discharge Recommendations  Patient's Response to Education: Verbalized Understanding    Equipment used: rw, gait belt  Demonstrates functional use, Would benefit from additional trial yes     Therapist comments: Assisted to chair in preparation for commode transfers. Patient noted to have increased NEAL with minimal activity. He was educated on importance of pacing activity . OT educated patient on goals. Left in chair with needs met, alarm on .    Patient End of Session: Hospital anti-slip socks;All patient questions and concerns addressed;RN aware of session/findings;Call light within reach;Needs met;Up in chair;Alarm set    OCCUPATIONAL PROFILE    HOME SITUATION  Type of Home: House  Home Layout: Able to live on main level;Other (Comment);Two level (can sleep on a couch on main level)  Lives With: Spouse    Toilet and Equipment: Standard height toilet (adjacent sink)  Shower/Tub and Equipment: Walk-in shower;Shower chair;Other (Comment) (on second floor, takes shower daily)       Occupation/Status: retired     Drives: Yes  Patient Regularly Uses: None    Prior Level of Function: Independent with self care and IADLs, lives with spouse in     SUBJECTIVE   Eager to move    PAIN ASSESSMENT  Ratin          OBJECTIVE  Precautions: Bed/chair alarm;Aspiration;Other (Comment) (PEG tube)  Fall Risk: Standard fall  risk    ASSESSMENTS    AM-PAC ‘6-Clicks’ Inpatient Daily Activity Short Form  -   Putting on and taking off regular lower body clothing?: A Little  -   Bathing (including washing, rinsing, drying)?: A Little  -   Toileting, which includes using toilet, bedpan or urinal? : A Little  -   Putting on and taking off regular upper body clothing?: A Little  -   Taking care of personal grooming such as brushing teeth?: None  -   Eating meals?: None    AM-PAC Score:  Score: 20  Approx Degree of Impairment: 38.32%  Standardized Score (AM-PAC Scale): 42.03    ADDITIONAL TESTS     NEUROLOGICAL FINDINGS      COGNITION ASSESSMENTS     PLAN     OT Treatment Plan: Endurance training;UE strengthening/ROM;Functional transfer training;ADL training;Energy conservation/work simplification techniques;Equipment eval/education;Patient/Family education;Patient/Family training;Compensatory technique education  Rehab Potential : Good  Frequency: 3-5x/week  Number of Visits to Meet Established Goals: 4    ADL Goals   Patient will perform grooming: with supervision and while standing at sink  Patient will perform upper body dressing:  with setup and with supervision  Patient will perform lower body dressing:  with setup and with supervision  Patient will perform toileting: with supervision    Functional Transfer Goals  Patient will transfer to toilet:  with supervision    UE Exercise Program Goal  Patient will be supervision with bilateral AROM HEP (home exercise program).    Additional Goals  Patent will describe how to use energy conservation methods during ADLs    Patient Evaluation Complexity Level:   Occupational Profile/Medical History LOW - Brief history including review of medical or therapy records    Specific performance deficits impacting engagement in ADL/IADL LOW  1 - 3 performance deficits    Client Assessment/Performance Deficits MODERATE - Comorbidities and min to mod modifications of tasks    Clinical Decision Making LOW -  Analysis of occupational profile, problem-focused assessments, limited treatment options    Overall Complexity LOW     OT Session Time: 14 minutes    Therapeutic Activity: 10 minutes

## 2024-10-28 NOTE — PROGRESS NOTES
Wang Romero Patient Status:  Inpatient    1945 MRN [id number]   Location [facility] 4SW-A Attending Hema Crouch*   Hosp Day # 1 PCP Colette Steele MD     Critical Care Progress Note          Subjective:  No melena noted overnight.     Objective:    Medications:   levothyroxine  50 mcg Per G Tube Before breakfast    atorvastatin  10 mg Oral Nightly    piperacillin-tazobactam  3.375 g Intravenous Q8H    azithromycin  500 mg Intravenous Q24H    ticagrelor  90 mg Per G Tube Q12H    pantoprazole  40 mg Intravenous Q12H              Intake/Output Summary (Last 24 hours) at 10/28/2024 0730  Last data filed at 10/28/2024 0400  Gross per 24 hour   Intake 1291.67 ml   Output 1400 ml   Net -108.33 ml       /61   Pulse 101   Temp 98.2 °F (36.8 °C) (Temporal)   Resp (!) 28   Ht 177.8 cm (5' 10\")   Wt 139 lb 15.9 oz (63.5 kg)   SpO2 96%   BMI 20.09 kg/m²     Physical Exam:   General Appearance: Alert, cooperative  Neck: No JVD, neck supple, no adenopathy, trachea midline  Lungs: Diminished to auscultation bilaterally, respirations unlabored  Heart: Irregular rate and rhythm, S1 and S2 normal, no murmur, rub or gallop  Abdomen: Soft, non-tender, bowel sounds active all four quadrants, no masses, no organomegaly, G tube present  Extremities: 1+ edema    Pulses: 1+ and symmetric all extremities  Skin: Skin color, texture, turgor normal for ethnicity, no rashes or lesions, warm and dry      Recent Labs   Lab 10/28/24  0428   RBC 2.61*   HGB 8.0*   HCT 24.2*   MCV 92.7   MCH 30.7   MCHC 33.1   RDW 16.1   NEPRELIM 5.06   WBC 6.1   .0     Recent Labs   Lab 10/27/24  0340 10/28/24  0428   * 109*   BUN 58* 37*   CREATSERUM 1.27 1.01   CA 9.2 9.3   ALB 3.7  --    * 138   K 3.6 3.6   CL 94* 104   CO2 30.0 29.0   ALKPHO 127*  --    AST 38*  --    ALT 19  --    BILT 1.1  --    TP 6.6  --      No results for input(s): \"ABGPHT\", \"IAQIAQ9X\", \"OVMFL1G\", \"ABGHCO3\", \"ABGBE\",  \"TEMP\", \"SPENCER\", \"SITE\", \"DEV\", \"THGB\" in the last 168 hours.    Invalid input(s): \"QHK84PVQ\", \"CHOB\"  No results for input(s): \"BNP\" in the last 168 hours.  No results for input(s): \"TROP\", \"CK\" in the last 168 hours.    No results found.       Assessment/Plan:    Septic shock 2/2 to CARLOS pna  - Lactic acid elevated, now normalized  - Blood cultures pending  - Vasopressors to maintain MAP >65   - Zosyn (10/27- ) Azithro (10/27- )    Pneumonia, possible aspiration  - Chest xray with CARLOS airspace disease   - MRSA nares negative  - Check strep/legionella urine Ag  - Check sputum culture    Acute blood loss anemia 2/2 to GIB, epistaxis  - Presenting with melena, c/o epistaxis PTA  - Hgb drop to 6.4 (down from 8.2 the week prior)  - s/p 2u PRBCs 10/27, hgb stable today 8.0  - PPI BID  - HGB q8h  - ASA on hold, Brilinta resumed per vasc surg  - GI following, no plans for EGD at this time    History of head and neck cancer s/p radiation  - Chronic PEG/TF- currently on hold due to GI bleed    Chronic dysphagia 2/2 to above  - Aspiration precautions  - Speech therapy to see  - PEG/TF- ok to resume    Carotid artery diease s/p stent placement  - ASA on hold, brilinta resumed   - Vasc surgery consulted    Afib  - BB  - Decision was made per cardiology last admission to hold apixaban  - PRN diltiazem    Hypothyroid  - PTA synthroid    F/E/N  - NPO- ok to resume TF per GI  - Replete electrolytes per protocol    Ppx  - PPI BID  - No dvt chemoppx 2/2 to GIB  - SCDs  - PT/OT      Dispo:   - DNAR/select  - ICU monitoring    Plan of care [facility] intens[facility]  Critical Care  [id number]      Critical Care Addenda    I agree with the APC history and plan and have independently evaluated and examined this patient.     Patient with some GI bleeding no further hemorrahge today. W are deferring endoscopy given his stability, and are treating for pna. He remains on abx, for this left upper lobe  aspiration pna vs pnuemonitis, on protonix and monitoring for possible GIB. He remains on brilinta per vascular surgery recs. For his afib he did get some IVP metop, can consider starting 25mg motop tartrate if his rate is not controlled well. Ok to restart tube feeds.    Code status is DNAR yoel finley  Critical Care    DOS 10/28/24    36 minutes of cct were spent managing GIB, aspiration pneumonia, afib with rvr, resp failure.

## 2024-10-29 NOTE — PAYOR COMM NOTE
--------------  ADMISSION REVIEW     Payor: [employer] MED[employer] ADV PPO  Subscriber #:  [id number]  Authorization Number: [id number]    Admit date: 10/27/24  Admit time:  9:03 AM       History   HPI  Patient is an 78-year-old male presenting the emergency department for evaluation of difficulty breathing and generalized weakness.  Patient had just undergone a carotid endarterectomy.  He has had a prior ER visit following the procedure for blood in urine followed by a visit when he accidentally dislodged his G-tube.  Patient reports that he has been having a cough and feeling weak ever since he got out of the hospital but those symptoms worsened.  He has also had black stools.    ED Triage Vitals [10/27/24 0301]   /67   Pulse (!) 131   Resp (!) 30   Temp 97.9 °F (36.6 °C)   Temp src Oral   SpO2 (!) 85 %   O2 Device None (Room air)     Physical Exam  Eyes: No scleral icterus  Heart: regular rate rhythm, no murmurs  Lungs: tachypnea, rhonchi noted in the left upper lung fields.  Abdomen: soft and nontender  Rectal exam: No tenderness, Hemoccult positive stool  Skin: No rash    Labs Reviewed   CBC WITH DIFFERENTIAL WITH PLATELET - Abnormal; Notable for the following components:       Result Value    RBC 1.96 (*)     HGB 6.4 (*)     HCT 19.4 (*)     Neutrophil Absolute Prelim 9.18 (*)     Neutrophil Absolute 9.18 (*)     Lymphocyte Absolute 0.15 (*)     All other components within normal limits   COMP METABOLIC PANEL (14) - Abnormal; Notable for the following components:    Glucose 232 (*)     Sodium 129 (*)     Chloride 94 (*)     BUN 58 (*)     eGFR-Cr 57 (*)     AST 38 (*)     Alkaline Phosphatase 127 (*)     All other components within normal limits   PROTHROMBIN TIME (PT) - Abnormal; Notable for the following components:    PT 16.7 (*)     INR 1.33 (*)     All other components within normal limits   LACTIC ACID, PLASMA - Abnormal; Notable for the following components:    Lactic Acid 2.9 (*)     All other components  within normal limits   PRO BETA NATRIURETIC PEPTIDE - Abnormal; Notable for the following components:    Pro-Beta Natriuretic Peptide 7,099 (*)    EKG    Rate, intervals and axes as noted on EKG Report.  Rate: 126  Rhythm: Atrial Fibrillation  Reading:     Disposition and Plan     Clinical Impression:  1. Community acquired pneumonia, unspecified laterality    2. Melena    3. Status post carotid endarterectomy    4. Hyponatremia       Disposition:  Admit  10/27/2024  6:08 am    History and Physical     BP 97/60   Pulse 119   Temp 98.3 °F (36.8 °C) (Oral)   Resp 24   Ht 5' 10\" (1.778 m)   Wt 125 lb (56.7 kg)   SpO2 97%   BMI 17.94 kg/m²   Respiratory: No rhonchi, no wheezes  Cardiovascular: S1, S2. Regular rate and rhythm  Abdomen: Soft, Non-tender, non-distended, positive bowel sounds  Neuro: No new focal deficits  Extremities: No edema     Lab 10/27/24  0340   *   BUN 58*   CREATSERUM 1.27   EGFRCR 57*   CA 9.2   ALB 3.7   *   K 3.6   CL 94*   CO2 30.0   ALKPHO 127*   AST 38*   ALT 19   BILT 1.1   TP 6.6      Lab 10/27/24  0340   TROPHS 36      Lab 10/27/24  0340   PBNP 7,099*      Assessment & Plan:    # Septic shock  - Fever, tachycardia, tachypnia,pneumonia, hypotension  - Patient getting sepsis bolus in ED  - admit to ICU  -Continue broad-spectrum antibiotics  -Cultures pending     # Pneumonia  - Will continue on ceftriaxone and azithromycin  - cultures pending     # GI bleed  - Will continue on IV ppi  - GI on consult     # Profound anemia   -secondary to GI bleed  -Patient getting 1 unit PRBCs     # Carotid artery disease  - recent stents placed  - Will hold ASA and ticangralor with GI bleed.  - Will contibue on statin therapy.     # Hypothyroidism  - Will continue on levothyroxine.         10/28/24  Lab 10/21/24  1131 10/27/24  0339 10/27/24  1014 10/27/24  1548 10/28/24  0427 10/28/24  0428   WBC 5.4 10.1  --   --   --  6.1   HGB 8.2* 6.4* 6.2* 7.7* 8.0* 8.0*   MCV 98.4 99.0  --   --    --  92.7   .0 267.0  --   --   --  213.0   INR  --  1.33*  --   --   --   --       Lab 10/27/24  0340 10/28/24  0428   * 109*   BUN 58* 37*   CREATSERUM 1.27 1.01   CA 9.2 9.3   ALB 3.7  --    * 138   K 3.6 3.6   CL 94* 104   CO2 30.0 29.0   ALKPHO 127*  --    AST 38*  --    ALT 19  --    BILT 1.1  --    TP 6.6  --    Medications:    levothyroxine  50 mcg Per G Tube Before breakfast    atorvastatin  10 mg Oral Nightly    piperacillin-tazobactam  3.375 g Intravenous Q8H    azithromycin  500 mg Intravenous Q24H    ticagrelor  90 mg Per G Tube Q12H    pantoprazole  40 mg Intravenous Q12H       Assessment & Plan:    # Septic shock  - Fever, tachycardia, tachypnea, pneumonia, hypotension  - IVF  - IV abx  - f/u cultures     # CARLOS Pneumonia  - Will continue on ceftriaxone and azithromycin  - cultures pending     # GI bleed  - Will continue on IV ppi BID  - GI following, EGD once fever free for 24 hrs  - no further bleeding tube feeds on hold     # Profound anemia due to ABLA  -secondary to GI bleed   Hgb improved after PRBC overnight     # Carotid artery disease  - recent stents placed  - Will hold ASA and ticangralor with GI bleed.  - Will contibue on statin therapy.     # Hypothyroidism  - Will continue on levothyroxine.     # H/o head and neck cancer with dysphagia s/p PEG     # SMOOTH, resolved       MEDICATIONS ADMINISTERED IN LAST 1 DAY:  atorvastatin (Lipitor) tab 10 mg       Date Action Dose Route User    10/28/2024 2046 Given 10 mg Oral Ren Bales RN          azithromycin (Zithromax) 500 mg in sodium chloride 0.9% 250mL IVPB premix       Date Action Dose Route User    10/29/2024 0636 New Bag 500 mg Intravenous Ren Bales RN          ipratropium-albuterol (Duoneb) 0.5-2.5 (3) MG/3ML inhalation solution 3 mL       Date Action Dose Route User    10/29/2024 0711 Given 3 mL Nebulization Sancho Palacios RCP    10/28/2024 0827 Given 3 mL Nebulization Yarely Stanton, RCP           levothyroxine (Synthroid) tab 50 mcg       Date Action Dose Route User    10/29/2024 0610 Given 50 mcg Per G Tube Ren Bales RN          pantoprazole (Protonix) 40 mg in sodium chloride 0.9% PF 10 mL IV push       Date Action Dose Route User    10/29/2024 0606 Given 40 mg Intravenous Ren Bales RN    10/28/2024 1728 Given 40 mg Intravenous Prehn, Sarah, RN          piperacillin-tazobactam (Zosyn) 3.375 g in dextrose 5% 100 mL IVPB-ADDV       Date Action Dose Route User    10/29/2024 0300 New Bag 3.375 g Intravenous Ren Bales RN    10/28/2024 1728 New Bag 3.375 g Intravenous Prehn, Sarah, RN    10/28/2024 1134 New Bag 3.375 g Intravenous Clotilde Cuevas RN          ticagrelor (Brilinta) tab 90 mg       Date Action Dose Route User    10/28/2024 2309 Given 90 mg Per G Tube Ren Bales RN    10/28/2024 1134 Given 90 mg Per G Tube Clotilde Cuevas RN            Vitals (last day)       Date/Time Temp Pulse Resp BP SpO2 Weight O2 Device O2 Flow Rate (L/min) Who    10/29/24 0756 97.8 °F (36.6 °C) 119 20 116/67 100 % -- Nasal cannula 2 L/min NN    10/29/24 0547 97.3 °F (36.3 °C) 114 -- 103/72 96 % -- -- -- BR    10/28/24 2339 97.9 °F (36.6 °C) 93 26 121/78 97 % -- Nasal cannula 2 L/min BR    10/28/24 1927 98.5 °F (36.9 °C) 124 28 104/75 98 % -- Nasal cannula 2 L/min BR    10/28/24 1600 98.7 °F (37.1 °C) 101 31 115/64 98 % -- Nasal cannula 2 L/min SP    10/28/24 0900 99.2 °F (37.3 °C) 110 23 115/63 96 % -- -- -- ARLYN    10/28/24 0400 98.2 °F (36.8 °C) 105 27 119/73 95 % -- Nasal cannula 1 L/min CS     Blood Transfusion Record       Product Unit Status Volume Start End            Transfuse RBC       24  [id number]  [id number] Completed 10/27/24 1439 391.67 mL 10/27/24 1122 10/27/24 1430       24  [id number]  [id number] Completed 10/27/24 0838 700 mL 10/27/24 0630 10/27/24 0836

## 2024-10-29 NOTE — CDS QUERY
Dear Dr. Pabon,    Clinical information includes a diagnosis of ACUTE KIDNEY INJURY.  Based on your judgement and review of the clinical findings, can you please specify further the diagnosis being treated?    [   ] Acute Kidney Injury ruled out   [ x  ] Acute Kidney Injury confirmed (Please provide additional support):  [   ] Other (Please specify):      CLINICAL INFORMATION FROM THE MEDICAL RECORD    Risk Factors:  Sepsis s/t pneumonia    Clinical Indicators:   Creat 1.27 on admission  Repeat Creat 1.01 (10/28/24), Creat 0.92 (10/29/24)  Historical Creat 0.89 (10/16/24), Creat 1.06 (1.06)  1100 ml urine output (10/27/24 1130 - 10/28/24 0000) -> 1.5 ml/kg/hr    Treatment:   IVF  Repeat BMP      For questions regarding this query, please contact Clinical : Paz Rodriguez RN at #[phone].    THIS FORM IS A PERMANENT PART OF THE MEDICAL RECORD

## 2024-10-29 NOTE — PLAN OF CARE
Patient is alert and oriented times four. His speech is slurred and difficult to understand (from past neck surgery), but he uses sign language to communicate. He is in afib on the monitor. He had a small nose bleed this AM but this stopped easily. He has no c/o pain. He is up with stand by assist and the walker. His lungs are diminished with crackles and wheezes throughout. He hs a small incision right neck (from carotid stent 10/15). He sates that he \"feels weak\". His wife says that he has not slept well in days.

## 2024-10-29 NOTE — PROGRESS NOTES
Gastroenterology Progress Note  Patient Name: Wang Romero  Chief Complaint: Melena  S: Pt denies abdominal pain. He denies melena. However, per nursing he may have had a dark stool mixed with a brown stool.   O: /67 (BP Location: Left arm)   Pulse (!) 130   Temp 97.8 °F (36.6 °C) (Oral)   Resp 20   Ht 5' 10\" (1.778 m)   Wt 134 lb (60.8 kg)   SpO2 (!) 82%   BMI 19.23 kg/m²   Gen: Awake and alert and conversing well this am  CV: tachycardic  Resp: decreased BS  Abd: (+)BS, soft, non-tender, non-distended; no rebound or guarding; G tube intact  Ext: No edema or cyanosis  Skin: Warm and dry  Laboratory Data:   Lab Results   Component Value Date    WBC 7.3 10/29/2024    HGB 8.2 10/29/2024    HGB 8.2 10/29/2024    HCT 24.6 10/29/2024    HCT 25.0 10/29/2024    .0 10/29/2024    CREATSERUM 0.92 10/29/2024    BUN 29 10/29/2024     10/29/2024    K 3.4 10/29/2024     10/29/2024    CO2 30.0 10/29/2024     10/29/2024    CA 9.2 10/29/2024     Recent Labs   Lab 10/27/24  0340 10/28/24  0428 10/29/24  0441   * 109* 125*   BUN 58* 37* 29*   CREATSERUM 1.27 1.01 0.92   CA 9.2 9.3 9.2   * 138 144   K 3.6 3.6 3.4*   CL 94* 104 106   CO2 30.0 29.0 30.0     Recent Labs   Lab 10/29/24  0441   RBC 2.60*   HGB 8.2*  8.2*   HCT 25.0*  24.6*   MCV 94.6   MCH 31.5   MCHC 33.3   RDW 15.9   NEPRELIM 6.31   WBC 7.3   .0       Recent Labs   Lab 10/27/24  0340   ALT 19   AST 38*       Assessment:   Wang Romero is a 78 year old male with Chronic medical conditions including head and neck cancer status post radiation therapy complicated by dysphagia with G-tube placement.  Presented with dyspnea and found to have anemia along with melena.  We were consulted for this reason. Hgb has been stable and pt without further overt GI bleeding     Acute blood loss anemia-could be due to nose bleed vs upper GI etiology such as PUD, esophagitis, etc  Melena-May need to rule out upper  GI bleed.  He did also have a nosebleed previously which may be contributing.  This is all likely exacerbated in the setting of Brilinta use due to recent carotid artery stent placement.  Sepsis-noted to have fevers on admission along with hypotension.  Thought to be due to pneumonia.  History of head and neck cancer status postradiation therapy  Plan:   Pantoprazole 40 mg IV twice daily and switch to po daily at time of discharge for total of 8 weeks   Monitor Hgb and transfuse for hemoglobin less than 7  Spoke with patient and wife Erin and they are declining EGD at this time. They understand that there is a risk of missing gastrointestinal pathology without doing the procedure which could lead to morbidity and mortality. However, pt would also be at high risk for the procedure given altered anatomy and respiratory status.   Please monitor for overt GI bleeding and call back if needed      We will sign off at this time    Tila Ryder DO  10:57 AM  10/29/2024  [facility] Gastroenter[facility]  [phone]

## 2024-10-29 NOTE — PROGRESS NOTES
Progress Note  Wang Romero Patient Status:  Inpatient    1945 MRN [id number]   Location [facility] 8NE-A Attending Hema Crouch*   Hosp Day # 2 PCP Colette Steele MD     Subjective:  Reports fatigue and having had poor sleep while here. Otherwise doing well. Dark stools this morning, but no yunior bleeding.    Objective:  Physical Exam:   /67 (BP Location: Left arm)   Pulse (!) 130   Temp 97.8 °F (36.6 °C) (Oral)   Resp 20   Ht 5' 10\" (1.778 m)   Wt 134 lb (60.8 kg)   SpO2 (!) 82%   BMI 19.23 kg/m²   Temp (24hrs), Av °F (36.7 °C), Min:97.3 °F (36.3 °C), Max:98.7 °F (37.1 °C)       Intake/Output Summary (Last 24 hours) at 10/29/2024 115  Last data filed at 10/29/2024 0921  Gross per 24 hour   Intake 1092 ml   Output 1300 ml   Net -208 ml     Wt Readings from Last 3 Encounters:   10/29/24 134 lb (60.8 kg)   10/23/24 130 lb (59 kg)   10/22/24 130 lb (59 kg)     Telemetry: afib in the 110-120s  General: Chronically ill and cachectic patient who is alert and oriented in no apparent distress seated comfortably in his bedside chair with visiting with his wife.  HEENT: No focal deficits.  Neck: No JVD,  Cardiac: Regular rate and rhythm, S1, S2 normal, no murmur, rub or gallop.  Lungs: Harsh upper airway breathsounds otherwise CTA.  Normal excursions and effort on 2L by nasal cannula.  Abdomen: Soft, non-tender.   Extremities: Without clubbing, cyanosis or edema.  Peripheral pulses are 2+.  Neurologic: Alert and oriented, normal affect.  Skin: Warm and dry.      Intake/Output:    Intake/Output Summary (Last 24 hours) at 10/29/2024 1152  Last data filed at 10/29/2024 0921  Gross per 24 hour   Intake 1092 ml   Output 1300 ml   Net -208 ml       Last 3 Weights   10/29/24 0711 134 lb (60.8 kg)   10/28/24 0600 139 lb 15.9 oz (63.5 kg)   10/27/24 0909 142 lb 6.7 oz (64.6 kg)   10/27/24 0301 125 lb (56.7 kg)   10/23/24 2232 130 lb (59 kg)   10/22/24 0650 130 lb (59 kg)        Labs:  Recent Labs   Lab 10/27/24  0340 10/28/24  0428 10/29/24  0441   * 109* 125*   BUN 58* 37* 29*   CREATSERUM 1.27 1.01 0.92   EGFRCR 57* 75 84   CA 9.2 9.3 9.2   * 138 144   K 3.6 3.6 3.4*   CL 94* 104 106   CO2 30.0 29.0 30.0     Recent Labs   Lab 10/27/24  0339 10/27/24  1014 10/28/24  0428 10/28/24  1157 10/28/24  2008 10/29/24  0441   RBC 1.96*  --  2.61*  --   --  2.60*   HGB 6.4*   < > 8.0* 8.2* 8.2* 8.2*  8.2*   HCT 19.4*   < > 24.2* 25.6* 25.2* 25.0*  24.6*   MCV 99.0  --  92.7  --   --  94.6   MCH 32.7  --  30.7  --   --  31.5   MCHC 33.0  --  33.1  --   --  33.3   RDW 13.6  --  16.1  --   --  15.9   NEPRELIM 9.18*  --  5.06  --   --  6.31   WBC 10.1  --  6.1  --   --  7.3   .0  --  213.0  --   --  231.0    < > = values in this interval not displayed.         Recent Labs   Lab 10/27/24  0340   TROPHS 36       Diagnostics:   ECHOCARDIOGRAM 12/2023:  1. Left ventricle: The cavity size was normal. Wall thickness was normal.      Systolic function was normal. The estimated ejection fraction was 60-65%.   2. Aortic valve: A SAVR has been performed. The leaflets were thickened with      decreased mobility. There was mild to moderate regurgitation which      appeared perivalvular. The peak systolic velocity was 2.69m/sec. The mean      systolic gradient was 15mm Hg. The valve area (VTI) was 1cm^2. The valve      area (VTI) index was 0.55cm^2/m^2.   3. Ascending aorta: The ascending aorta was 4.2cm diameter.   4. Mitral valve: Transvalvular velocity was increased. The findings were      consistent with mild to moderate stenosis. The mean diastolic gradient      was 8mm Hg.   5. Pulmonary arteries: Systolic pressure was mildly increased, estimated to      be 37mm Hg.   Impressions:  This study is compared with previous dated 11/29/2020: Similar   findings.         Medications:   metoprolol tartrate  12.5 mg Oral TID Beta Blocker/Cardiac    aspirin  81 mg Oral Daily    levothyroxine   50 mcg Per G Tube Before breakfast    atorvastatin  10 mg Oral Nightly    piperacillin-tazobactam  3.375 g Intravenous Q8H    ticagrelor  90 mg Per G Tube Q12H    pantoprazole  40 mg Intravenous Q12H      dextrose 10%         Assessment/Plan:    H/o RHD,s/p bio AVR and MV repair 2008  Carotid artery stenosis s/p stent placement x 2 10/15/2024  On brilinta - consider restart ASA d/t stroke risk  No current bleeding reported  Chronic afib  Ventricular rates elevated today - add metoprolol  Hx on Eliquis held for now given bleeding risk  Hx of head and neck cancer s/p radiation with G tube in place  Acute on chronic anemia  Declined endoscopy  Concern for GIB, continue to monitor  Hgb stable today  Chronically ill and cachectic  Aspiration PNA    PLAN  Add metoprolol 12.5 mg TID via G-tube - IV diltiazem as needed  Add ASA 81 mg dailyvia G-tube  Continue Brilinta  Holding Eliquis d/t bleeding risk  Follow Hgb and monitor for bleeding      Cardiology attending:  Pt seen and independently examined.  Note edited.  Plan of care performed by myself in its entirety.  Daughter at the bedside.  GI note read and appreciated.  Plan to follow, no endoscopy for now.  Risk of carotid stent thrombosis is greater than life threatening bleed.  Resume ASA, same brilinta.  Low dose metoprolol for rate control.  Clearly has trouble with his secretions, risk of aspiration.    Tre Durbin MD  L3    Juventino Herron PA-C  10/29/2024  11:52 AM

## 2024-10-29 NOTE — CDS QUERY
Dear Dr. Pabon,     Clinical information includes a diagnosis of SEPTIC SHOCK. Based on your judgement and review of the clinical findings, can you please specify further the diagnosis treated?    [x   ] Septic shock confirmed   [   ] Septic shock ruled out     [   ] Other (Please specify):       CLINICAL INFORMATION FROM THE MEDICAL RECORD    Risk Factors:  Pneumonia     Clinical Indicators:   VS in ED: T 101.3, P 131, RR 30, BP 85/56, O2Sat 85% RA  Lactic acid 2.9   Hypotension improved after administration of sepsis bolus (1705ml)  Other clinical findings: tachycardia, tachypnea    Treatment:   Sepsis bolus  IVF  Repeat lactic acid    For questions regarding this query, please contact Clinical : Paz Rodriguez RN at #[phone].    THIS FORM IS A PERMANENT PART OF THE MEDICAL RECORD

## 2024-10-29 NOTE — PHYSICAL THERAPY NOTE
Attempted to see pt for PT treatment session. Pt politely declining at this time due to c/o nose bleed. Will follow and re-attempt as able and appropriate.

## 2024-10-29 NOTE — CM/SW NOTE
Spoke with Natali over the phone for discharge planning. Confirmed pt receives tube feeds at home.  Natali is unable to recall the name of the provider, but states that it is all taken care of through [facility]. SW offered [facility] for pt at discharge for RN and PT services. Natali declining at this time. SW provided contact info if any needs arise prior to pt discharging.     Izzy GARNETT, LSW  Discharge Planner

## 2024-10-29 NOTE — PLAN OF CARE
Patient arrived onto unit from ICU at start of shift.    Patient alert and oriented x4. Currently on 2L O2 via n.c. Crackles heard bilaterally at lung bases.  with adequate sats, baseline RA. A fib on tele, rates in 100s. Continent, urinal at bedside. No pain reported by patient at this time. Up x1 with walker. Call light within reach. Fall precautions in place.    Skin check completed with PCT. Scabs noted around bridge of nose and bruising on penis.    Plan  Monitor Hgb  IV ABX    Problem: GASTROINTESTINAL - ADULT  Goal: Maintains or returns to baseline bowel function  Description: INTERVENTIONS:  - Assess bowel function  - Maintain adequate hydration with IV or PO as ordered and tolerated  - Evaluate effectiveness of GI medications  - Encourage mobilization and activity  - Obtain nutritional consult as needed  - Establish a toileting routine/schedule  - Consider collaborating with pharmacy to review patient's medication profile  Outcome: Progressing     Problem: Altered Communication/Language Barrier  Goal: Patient/Family is able to understand and participate in their care  Description: Interventions:  - Assess communication ability and preferred communication style  - Implement communication aides and strategies  - Use visual cues when possible  - Listen attentively, be patient, do not interrupt  - Minimize distractions  - Allow time for understanding and response  - Establish method for patient to ask for assistance (call light)  - Provide an  as needed  - Communicate barriers and strategies to overcome with those who interact with patient  Outcome: Progressing     Problem: RESPIRATORY - ADULT  Goal: Achieves optimal ventilation and oxygenation  Description: INTERVENTIONS:  - Assess for changes in respiratory status  - Assess for changes in mentation and behavior  - Position to facilitate oxygenation and minimize respiratory effort  - Oxygen supplementation based on oxygen saturation or ABGs  -  Provide Smoking Cessation handout, if applicable  - Encourage broncho-pulmonary hygiene including cough, deep breathe, Incentive Spirometry  - Assess the need for suctioning and perform as needed  - Assess and instruct to report SOB or any respiratory difficulty  - Respiratory Therapy support as indicated  - Manage/alleviate anxiety  - Monitor for signs/symptoms of CO2 retention  Outcome: Progressing

## 2024-10-29 NOTE — PROGRESS NOTES
[facility]   part of [facility]     Hospitalist Progress Note     Wang Romero Patient Status:  Inpatient    1945 MRN [id number]   Location [facility] 4SW-A Attending Hema Crouch*   Hosp Day # 2 PCP Colette Steele MD     Chief Complaint: SOB, cough    Subjective:     Patient doing well, no new complaints, transferred to floor, still with some congestion, on 2 liters, nose bleed this morning, now back on tube feeds, BP improved.    Objective:    Review of Systems:   A comprehensive review of systems was completed; pertinent positive and negatives stated in subjective.    Vital signs:  Temp:  [97.3 °F (36.3 °C)-99.2 °F (37.3 °C)] 97.3 °F (36.3 °C)  Pulse:  [] 114  Resp:  [20-35] 26  BP: (103-122)/(60-78) 103/72  SpO2:  [94 %-98 %] 96 %    Physical Exam:    General: No acute distress  Respiratory: No wheezes, no rhonchi  Cardiovascular: S1, S2, regular rate and rhythm  Abdomen: Soft, Non-tender, non-distended, positive bowel sounds  Neuro: No new focal deficits.   Extremities: No edema      Diagnostic Data:    Labs:  Recent Labs   Lab 10/27/24  0339 10/27/24  1014 10/28/24  0427 10/28/24  0428 10/28/24  1048 10/28/24  1157 10/28/24  2008 10/29/24  044   WBC 10.1  --   --  6.1  --   --   --  7.3   HGB 6.4*   < > 8.0* 8.0*  --  8.2* 8.2* 8.2*  8.2*   MCV 99.0  --   --  92.7  --   --   --  94.6   .0  --   --  213.0  --   --   --  231.0   INR 1.33*  --   --   --  1.56*  --   --   --     < > = values in this interval not displayed.       Recent Labs   Lab 10/27/24  0340 10/28/24  0428 10/29/24  0441   * 109* 125*   BUN 58* 37* 29*   CREATSERUM 1.27 1.01 0.92   CA 9.2 9.3 9.2   ALB 3.7  --   --    * 138 144   K 3.6 3.6 3.4*   CL 94* 104 106   CO2 30.0 29.0 30.0   ALKPHO 127*  --   --    AST 38*  --   --    ALT 19  --   --    BILT 1.1  --   --    TP 6.6  --   --        Estimated Creatinine Clearance: 57.5 mL/min (based on SCr of 0.92 mg/dL).    Recent Labs    Lab 10/27/24  0340   TROPHS 36       Recent Labs   Lab 10/27/24  0339 10/28/24  1048   PTP 16.7* 18.8*   INR 1.33* 1.56*                  Microbiology    Hospital Encounter on 10/27/24   1. Blood Culture     Status: None (Preliminary result)    Collection Time: 10/27/24  3:40 AM    Specimen: Blood,peripheral   Result Value Ref Range    Blood Culture Result No Growth 1 Day N/A         Imaging: Reviewed in Epic.    Medications:    levothyroxine  50 mcg Per G Tube Before breakfast    atorvastatin  10 mg Oral Nightly    piperacillin-tazobactam  3.375 g Intravenous Q8H    ticagrelor  90 mg Per G Tube Q12H    pantoprazole  40 mg Intravenous Q12H       Assessment & Plan:      # Septic shock due to PNA  - Fever, tachycardia, tachypnea, pneumonia, hypotension  - IVF  - IV abx  - f/u cultures     # CARLOS Pneumonia  - Will continue on ceftriaxone and azithromycin  - cultures pending     # GI bleed  - Will continue on IV ppi BID  - GI following, EGD only if develops brisk bleed  - no further bleeding tube feeds on hold     # Profound anemia due to ABLA  -secondary to GI bleed   Hgb improved after PRBC and stable since     # Hypothyroidism  - Will continue on levothyroxine.    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    #Chronic atrial fibrillation, with RVR this am, Eliquis on hold, IV Cardizem prn for HR > 110    #s/p right TCAR on 10/15/2024  Brilinta resumed  Vascular following    # H/o head and neck cancer with dysphagia s/p PEG    # SMOOTH, resolved     #Hypokalemia, replace PRN       Sharon Pabon MD    Supplementary Documentation:     Quality:  DVT Mechanical Prophylaxis:   SCDs,    DVT Pharmacologic Prophylaxis   Medication   None                Code Status: DNAR/Selective Treatment  Gaitan: No urinary catheter in place  Gaitan Duration (in days):   Central line:    PRISCILA:     Discharge is dependent on: progress  At this point Mr. Romero is expected to be discharge to: TBD    The 21st Century Cures Act makes medical notes like  these available to patients in the interest of transparency. Please be advised this is a medical document. Medical documents are intended to carry relevant information, facts as evident, and the clinical opinion of the practitioner. The medical note is intended as peer to peer communication and may appear blunt or direct. It is written in medical language and may contain abbreviations or verbiage that are unfamiliar.     Dietitian Malnutrition Assessment    Evaluation for Malnutrition: Criteria for severe malnutrition diagnosis- chronic illness related to   Body fat severe depletion., Muscle mass severe depletion.               RD Malnutrition Care Plan: See RD nutrition assessment for additional recommendations.    Body Fat/Muscle Mass:          Physician Assessment     Patient has a diagnosis of severe malnutrition        **Certification      PHYSICIAN Certification of Need for Inpatient Hospitalization - Initial Certification    Patient will require inpatient services that will reasonably be expected to span two midnight's based on the clinical documentation in H+P.   Based on patients current state of illness, I anticipate that, after discharge, patient will require TBD.

## 2024-10-30 NOTE — PLAN OF CARE
Problem: SAFETY ADULT - FALL  Goal: Free from fall injury  Description: INTERVENTIONS:  - Assess pt frequently for physical needs  - Identify cognitive and physical deficits and behaviors that affect risk of falls.  - [place] fall precautions as indicated by assessment.  - Educate pt/family on patient safety including physical limitations  - Instruct pt to call for assistance with activity based on assessment  - Modify environment to reduce risk of injury  - Provide assistive devices as appropriate  - Consider OT/PT consult to assist with strengthening/mobility  - Encourage toileting schedule  Outcome: Progressing     Problem: DISCHARGE PLANNING  Goal: Discharge to home or other facility with appropriate resources  Description: INTERVENTIONS:  - Identify barriers to discharge w/pt and caregiver  - Include patient/family/discharge partner in discharge planning  - Arrange for needed discharge resources and transportation as appropriate  - Identify discharge learning needs (meds, wound care, etc)  - Arrange for interpreters to assist at discharge as needed  - Consider post-discharge preferences of patient/family/discharge partner  - Complete POLST form as appropriate  - Assess patient's ability to be responsible for managing their own health  - Refer to Case Management Department for coordinating discharge planning if the patient needs post-hospital services based on physician/LIP order or complex needs related to functional status, cognitive ability or social support system  Outcome: Progressing     Problem: Altered Communication/Language Barrier  Goal: Patient/Family is able to understand and participate in their care  Description: Interventions:  - Assess communication ability and preferred communication style  - Implement communication aides and strategies  - Use visual cues when possible  - Listen attentively, be patient, do not interrupt  - Minimize distractions  - Allow time for understanding and response  -  Establish method for patient to ask for assistance (call light)  - Provide an  as needed  - Communicate barriers and strategies to overcome with those who interact with patient  Outcome: Progressing     Problem: GASTROINTESTINAL - ADULT  Goal: Maintains or returns to baseline bowel function  Description: INTERVENTIONS:  - Assess bowel function  - Maintain adequate hydration with IV or PO as ordered and tolerated  - Evaluate effectiveness of GI medications  - Encourage mobilization and activity  - Obtain nutritional consult as needed  - Establish a toileting routine/schedule  - Consider collaborating with pharmacy to review patient's medication profile  Outcome: Progressing     Problem: RESPIRATORY - ADULT  Goal: Achieves optimal ventilation and oxygenation  Description: INTERVENTIONS:  - Assess for changes in respiratory status  - Assess for changes in mentation and behavior  - Position to facilitate oxygenation and minimize respiratory effort  - Oxygen supplementation based on oxygen saturation or ABGs  - Provide Smoking Cessation handout, if applicable  - Encourage broncho-pulmonary hygiene including cough, deep breathe, Incentive Spirometry  - Assess the need for suctioning and perform as needed  - Assess and instruct to report SOB or any respiratory difficulty  - Respiratory Therapy support as indicated  - Manage/alleviate anxiety  - Monitor for signs/symptoms of CO2 retention  Outcome: Progressing     Problem: Diabetes/Glucose Control  Goal: Glucose maintained within prescribed range  Description: INTERVENTIONS:  - Monitor Blood Glucose as ordered  - Assess for signs and symptoms of hyperglycemia and hypoglycemia  - Administer ordered medications to maintain glucose within target range  - Assess barriers to adequate nutritional intake and initiate nutrition consult as needed  - Instruct patient on self management of diabetes  Outcome: Progressing

## 2024-10-30 NOTE — PAYOR COMM NOTE
--------------  CONTINUED STAY REVIEW    Payor: [employer] MED[employer] ADV PPO  Subscriber #:  [id number]  Authorization Number: [id number]    Admit date: 10/27/24  Admit time:  9:03 AM      10/29/24  Transferred to floor, still with some congestion, on 2 liters, nose bleed this morning, now back on tube feeds, BP improved.    Temp:  [97.3 °F (36.3 °C)-99.2 °F (37.3 °C)] 97.3 °F (36.3 °C)  Pulse:  [] 114  Resp:  [20-35] 26  BP: (103-122)/(60-78) 103/72  SpO2:  [94 %-98 %] 96 %     Physical Exam:    Respiratory: No wheezes, no rhonchi  Cardiovascular: S1, S2, regular rate and rhythm  Abdomen: Soft, Non-tender, non-distended, positive bowel sounds  Neuro: No new focal deficits.   Extremities: No edema     Lab 10/27/24  0339 10/28/24  0427 10/28/24  0428 10/28/24  1048 10/28/24  1157 10/28/24  2008 10/29/24  0441   WBC 10.1  --  6.1  --   --   --  7.3   HGB 6.4* 8.0* 8.0*  --  8.2* 8.2* 8.2*  8.2*   MCV 99.0  --  92.7  --   --   --  94.6   .0  --  213.0  --   --   --  231.0   INR 1.33*  --   --  1.56*  --   --   --      Lab 10/27/24  0340 10/28/24  0428 10/29/24  0441   * 109* 125*   BUN 58* 37* 29*   CREATSERUM 1.27 1.01 0.92   CA 9.2 9.3 9.2   ALB 3.7  --   --    * 138 144   K 3.6 3.6 3.4*   CL 94* 104 106   CO2 30.0 29.0 30.0   Medications:    levothyroxine  50 mcg Per G Tube Before breakfast    atorvastatin  10 mg Oral Nightly    piperacillin-tazobactam  3.375 g Intravenous Q8H    ticagrelor  90 mg Per G Tube Q12H    pantoprazole  40 mg Intravenous Q12H         Assessment & Plan:       # Septic shock due to PNA  - Fever, tachycardia, tachypnea, pneumonia, hypotension  - IVF  - IV abx  - f/u cultures     # CARLOS Pneumonia  - Will continue on ceftriaxone and azithromycin  - cultures pending     # GI bleed  - Will continue on IV ppi BID  - GI following, EGD only if develops brisk bleed  - no further bleeding tube feeds on hold     # Profound anemia due to ABLA  -secondary to GI bleed   Hgb improved  after PRBC and stable since     # Hypothyroidism  - Will continue on levothyroxine.     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     #Chronic atrial fibrillation, with RVR this am, Eliquis on hold, IV Cardizem prn for HR > 110     #s/p right TCAR on 10/15/2024  Brilinta resumed  Vascular following     # H/o head and neck cancer with dysphagia s/p PEG     # SMOOTH, resolved      #Hypokalemia, replace PRN         10/30/24  CARDIOLOGY   Lab 10/28/24  0428 10/29/24  0441 10/30/24  0520   * 125* 122*   BUN 37* 29* 25*   CREATSERUM 1.01 0.92 0.94   EGFRCR 75 84 82   CA 9.3 9.2 9.3    144 143   K 3.6 3.4* 3.4*  3.4*    106 107   CO2 29.0 30.0 30.0      Lab 10/28/24  0428 10/28/24  2008 10/29/24  0441 10/30/24  0520   RBC 2.61*  --  2.60* 2.67*   HGB 8.0* 8.2* 8.2*  8.2* 8.1*   HCT 24.2* 25.2* 25.0*  24.6* 26.5*   MCV 92.7  --  94.6 99.3   MCH 30.7  --  31.5 30.3   MCHC 33.1  --  33.3 30.6*   RDW 16.1  --  15.9 15.6   NEPRELIM 5.06  --  6.31 6.07   WBC 6.1  --  7.3 7.2   .0  --  231.0 260.0   Medications:   potassium chloride  40 mEq Oral Once    aspirin  81 mg Per G Tube Daily    metoprolol tartrate  12.5 mg Per G Tube TID Beta Blocker/Cardiac    atorvastatin  10 mg Per G Tube Nightly    levothyroxine  50 mcg Per G Tube Before breakfast    piperacillin-tazobactam  3.375 g Intravenous Q8H    ticagrelor  90 mg Per G Tube Q12H    pantoprazole  40 mg Intravenous Q12H       dextrose 10%           Assessment/Plan:     H/o RHD,s/p bio AVR and MV repair 2008  Carotid artery stenosis s/p stent placement x 2 10/15/2024  On brilinta - consider restart ASA d/t stroke risk  No current bleeding reported - Hgb stable  Chronic afib  Ventricular rates controlled - continue metoprolol  Hx on Eliquis - held for now given bleeding risk  Hx of head and neck cancer s/p radiation with G tube in place  Acute on chronic anemia  Declined endoscopy  Concern for GIB, continue to monitor  Hgb stable today  Chronically ill and  cachectic  Hypokalemia  Tx per cardiac electrolyte protocol  Aspiration PNA  SMOOTH  Resolved     PLAN  Continue metoprolol 12.5 mg TID via G-tube - IV diltiazem as needed  Continue Brilinta and ASA 81 mg dailyvia G-tube - reviewed risk of CVA Vs risk of bleeding in detail again today.  Holding Eliquis d/t bleeding risk  Follow Hgb and monitor for bleeding closely - if recurrent epitaxis consider ENT consult       MEDICATIONS ADMINISTERED IN LAST 1 DAY:  aspirin chewable tab 81 mg       Date Action Dose Route User    10/30/2024 1031 Given 81 mg Per G Tube Holly Reza RN          atorvastatin (Lipitor) tab 10 mg       Date Action Dose Route User    10/29/2024 2133 Given 10 mg Per G Tube Gene Carl RN          diphenhydrAMINE (Benadryl) cap/tab 25 mg       Date Action Dose Route User    10/29/2024 2133 Given 25 mg Oral Gene Carl RN          levothyroxine (Synthroid) tab 50 mcg       Date Action Dose Route User    10/30/2024 0511 Given 50 mcg Per G Tube Gene Carl RN          metoprolol tartrate (Lopressor) partial tab 12.5 mg       Date Action Dose Route User    10/30/2024 0511 Given 12.5 mg Per G Tube Gene Carl RN    10/29/2024 2132 Given 12.5 mg Per G Tube Gene Carl RN          pantoprazole (Protonix) 40 mg in sodium chloride 0.9% PF 10 mL IV push       Date Action Dose Route User    10/30/2024 0507 Given 40 mg Intravenous Gene Carl RN    10/29/2024 1753 Given 40 mg Intravenous Abbie Crowell RN          piperacillin-tazobactam (Zosyn) 3.375 g in dextrose 5% 100 mL IVPB-ADDV       Date Action Dose Route User    10/30/2024 1032 New Bag 3.375 g Intravenous Holly Reza RN    10/30/2024 0236 New Bag 3.375 g Intravenous Gene Carl RN    10/29/2024 1751 New Bag 3.375 g Intravenous Abbie Crowell RN          potassium chloride (Klor-Con M20) tab 40 mEq       Date Action Dose Route User    10/30/2024 1031 Given 40 mEq Oral Holly Reza, YUDELKA          ticagrelor (Brilinta) tab 90 mg        Date Action Dose Route User    10/30/2024 1031 Given 90 mg Per G Tube Holly Reza, YUDELKA    10/29/2024 2151 Given 90 mg Per G Tube Gene Carl, RN            Vitals (last day)       Date/Time Temp Pulse Resp BP SpO2 Weight O2 Device O2 Flow Rate (L/min) [facility]    10/30/24 1148 97.8 °F (36.6 °C) 93 19 117/71 100 % -- Nasal cannula 1 L/min NN    10/30/24 0500 98 °F (36.7 °C) 103 20 108/69 98 % 134 lb 0.6 oz (60.8 kg) -- 2 L/min MD    10/30/24 0000 98 °F (36.7 °C) 75 20 114/71 98 % -- Nasal cannula 2 L/min EJ    10/29/24 2100 98.2 °F (36.8 °C) 95 19 110/65 97 % -- Nasal cannula 2 L/min EJ    10/29/24 1638 98.1 °F (36.7 °C) 86 19 101/66 97 % -- Nasal cannula 2 L/min NN    10/29/24 0921 -- 130 -- -- 82 % -- -- -- NN    10/29/24 0756 97.8 °F (36.6 °C) 119 20 116/67 100 % -- Nasal cannula 2 L/min NN    10/29/24 0547 97.3 °F (36.3 °C) 114 -- 103/72 96 % -- -- -- BR

## 2024-10-30 NOTE — PROGRESS NOTES
[facility]   part of [facility]     Hospitalist Progress Note     Wang Romero Patient Status:  Inpatient    1945 MRN [id number]   Location [facility] 4SW-A Attending Hema Crouch*   Hosp Day # 3 PCP Colette Steele MD     Chief Complaint: SOB, cough    Subjective:     Breathing feels good at rest   Denies chest discomfort     Objective:    Review of Systems:   A comprehensive review of systems was completed; pertinent positive and negatives stated in subjective.    Vital signs:  Temp:  [97.8 °F (36.6 °C)-98.2 °F (36.8 °C)] 97.8 °F (36.6 °C)  Pulse:  [] 93  Resp:  [19-38] 19  BP: (101-127)/(65-81) 117/71  SpO2:  [93 %-100 %] 100 %    Physical Exam:    General: No acute distress  Respiratory: diminished   Cardiovascular: S1, S2, regular rate and rhythm  Abdomen: Soft, Non-tender, non-distended, positive bowel sounds  Neuro: No new focal deficits.   Extremities: + LE edema bilaterally       Diagnostic Data:    Labs:  Recent Labs   Lab 10/27/24  0339 10/27/24  1014 10/28/24  0428 10/28/24  1048 10/28/24  1157 10/28/24  2008 10/29/24  0441 10/30/24  0520   WBC 10.1  --  6.1  --   --   --  7.3 7.2   HGB 6.4*   < > 8.0*  --  8.2* 8.2* 8.2*  8.2* 8.1*   MCV 99.0  --  92.7  --   --   --  94.6 99.3   .0  --  213.0  --   --   --  231.0 260.0   INR 1.33*  --   --  1.56*  --   --   --   --     < > = values in this interval not displayed.       Recent Labs   Lab 10/27/24  0340 10/28/24  0428 10/29/24  0441 10/30/24  0520   * 109* 125* 122*   BUN 58* 37* 29* 25*   CREATSERUM 1.27 1.01 0.92 0.94   CA 9.2 9.3 9.2 9.3   ALB 3.7  --   --   --    * 138 144 143   K 3.6 3.6 3.4* 3.4*  3.4*   CL 94* 104 106 107   CO2 30.0 29.0 30.0 30.0   ALKPHO 127*  --   --   --    AST 38*  --   --   --    ALT 19  --   --   --    BILT 1.1  --   --   --    TP 6.6  --   --   --        Estimated Creatinine Clearance: 53.9 mL/min (based on SCr of 0.94 mg/dL).    Recent Labs   Lab  10/27/24  0340   TROPHS 36       Recent Labs   Lab 10/27/24  0339 10/28/24  1048   PTP 16.7* 18.8*   INR 1.33* 1.56*                  Microbiology    Hospital Encounter on 10/27/24   1. Blood Culture     Status: None (Preliminary result)    Collection Time: 10/27/24  3:40 AM    Specimen: Blood,peripheral   Result Value Ref Range    Blood Culture Result No Growth 3 Days N/A         Imaging: Reviewed in Epic.    Medications:    aspirin  81 mg Per G Tube Daily    metoprolol tartrate  12.5 mg Per G Tube TID Beta Blocker/Cardiac    atorvastatin  10 mg Per G Tube Nightly    levothyroxine  50 mcg Per G Tube Before breakfast    piperacillin-tazobactam  3.375 g Intravenous Q8H    ticagrelor  90 mg Per G Tube Q12H    pantoprazole  40 mg Intravenous Q12H       Assessment & Plan:      # Septic shock due to PNA  - shock resolved   - Cipro to complete course      # GI bleed  - Will continue on IV ppi BID  - GI following, pre reportedly declined endoscopic eval      #Anemia due to ABLA  -secondary to GI bleed  -Hgb improved after PRBC and stable since     # Hypothyroidism  - Will continue on levothyroxine.    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 Romero is expected to be discharge to: TBD    The 21st Century Cures Act makes medical notes like these available to patients in the interest of transparency. Please be advised this is a medical document. Medical  documents are intended to carry relevant information, facts as evident, and the clinical opinion of the practitioner. The medical note is intended as peer to peer communication and may appear blunt or direct. It is written in medical language and may contain abbreviations or verbiage that are unfamiliar.     Dietitian Malnutrition Assessment    Evaluation for Malnutrition: Criteria for severe malnutrition diagnosis- chronic illness related to   Body fat severe depletion., Muscle mass severe depletion.               RD Malnutrition Care Plan: See RD nutrition assessment for additional recommendations.    Body Fat/Muscle Mass:          Physician Assessment     Patient has a diagnosis of severe malnutrition        **Certification      PHYSICIAN Certification of Need for Inpatient Hospitalization - Initial Certification    Patient will require inpatient services that will reasonably be expected to span two midnight's based on the clinical documentation in H+P.   Based on patients current state of illness, I anticipate that, after discharge, patient will require TBD.

## 2024-10-30 NOTE — PROGRESS NOTES
Progress Note  Wang Romero Patient Status:  Inpatient    1945 MRN [id number]   Location [facility] 8NE-A Attending Hema Crouch*   Hosp Day # 3 PCP Colette Steele MD     Subjective:  Mr. Romero feels improved. Epitaxis last night resolved spontaneously without recurrence. Brown colored BM this morning without blood.     Objective:  Physical Exam:   /81 (BP Location: Right arm)   Pulse 84   Temp 98 °F (36.7 °C) (Oral)   Resp 19   Ht 5' 10\" (1.778 m)   Wt 134 lb 0.6 oz (60.8 kg)   SpO2 100%   BMI 19.23 kg/m²   Temp (24hrs), Av.1 °F (36.7 °C), Min:98 °F (36.7 °C), Max:98.2 °F (36.8 °C)       Intake/Output Summary (Last 24 hours) at 10/30/2024 09  Last data filed at 10/30/2024 0753  Gross per 24 hour   Intake 2420 ml   Output 475 ml   Net 1945 ml     Wt Readings from Last 3 Encounters:   10/30/24 134 lb 0.6 oz (60.8 kg)   10/23/24 130 lb (59 kg)   10/22/24 130 lb (59 kg)     Telemetry: Afib 90's  General: Chronically ill and cachectic patient who is alert and oriented in no apparent distress seated comfortably in his bedside chair.  HEENT: No focal deficits.  Neck: No JVD,  Cardiac: Regular rate and rhythm, S1, S2 normal, 1/6 systolic murmur, rub or gallop.  Lungs: Diminished otherwise improved today - CTA.  Normal excursions and effort on 2L by nasal cannula.  Abdomen: Soft, non-tender.   Extremities: Without clubbing, cyanosis or edema.  Peripheral pulses are 2+.  Neurologic: Alert and oriented, normal affect.  Skin: Warm and dry.      Intake/Output:    Intake/Output Summary (Last 24 hours) at 10/30/2024 0902  Last data filed at 10/30/2024 0753  Gross per 24 hour   Intake 2420 ml   Output 475 ml   Net 1945 ml       Last 3 Weights   10/30/24 0500 134 lb 0.6 oz (60.8 kg)   10/29/24 0711 134 lb (60.8 kg)   10/28/24 0600 139 lb 15.9 oz (63.5 kg)   10/27/24 0909 142 lb 6.7 oz (64.6 kg)   10/27/24 0301 125 lb (56.7 kg)   10/23/24 2232 130 lb (59 kg)   10/22/24 0650 130  lb (59 kg)       Labs:  Recent Labs   Lab 10/28/24  0428 10/29/24  0441 10/30/24  0520   * 125* 122*   BUN 37* 29* 25*   CREATSERUM 1.01 0.92 0.94   EGFRCR 75 84 82   CA 9.3 9.2 9.3    144 143   K 3.6 3.4* 3.4*  3.4*    106 107   CO2 29.0 30.0 30.0     Recent Labs   Lab 10/28/24  0428 10/28/24  1157 10/28/24  2008 10/29/24  0441 10/30/24  0520   RBC 2.61*  --   --  2.60* 2.67*   HGB 8.0*   < > 8.2* 8.2*  8.2* 8.1*   HCT 24.2*   < > 25.2* 25.0*  24.6* 26.5*   MCV 92.7  --   --  94.6 99.3   MCH 30.7  --   --  31.5 30.3   MCHC 33.1  --   --  33.3 30.6*   RDW 16.1  --   --  15.9 15.6   NEPRELIM 5.06  --   --  6.31 6.07   WBC 6.1  --   --  7.3 7.2   .0  --   --  231.0 260.0    < > = values in this interval not displayed.         Recent Labs   Lab 10/27/24  0340   TROPHS 36       Diagnostics:   ECHOCARDIOGRAM 12/2023:  1. Left ventricle: The cavity size was normal. Wall thickness was normal.      Systolic function was normal. The estimated ejection fraction was 60-65%.   2. Aortic valve: A SAVR has been performed. The leaflets were thickened with      decreased mobility. There was mild to moderate regurgitation which      appeared perivalvular. The peak systolic velocity was 2.69m/sec. The mean      systolic gradient was 15mm Hg. The valve area (VTI) was 1cm^2. The valve      area (VTI) index was 0.55cm^2/m^2.   3. Ascending aorta: The ascending aorta was 4.2cm diameter.   4. Mitral valve: Transvalvular velocity was increased. The findings were      consistent with mild to moderate stenosis. The mean diastolic gradient      was 8mm Hg.   5. Pulmonary arteries: Systolic pressure was mildly increased, estimated to      be 37mm Hg.   Impressions:  This study is compared with previous dated 11/29/2020: Similar   findings.         Medications:   potassium chloride  40 mEq Oral Once    aspirin  81 mg Per G Tube Daily    metoprolol tartrate  12.5 mg Per G Tube TID Beta Blocker/Cardiac    atorvastatin   10 mg Per G Tube Nightly    levothyroxine  50 mcg Per G Tube Before breakfast    piperacillin-tazobactam  3.375 g Intravenous Q8H    ticagrelor  90 mg Per G Tube Q12H    pantoprazole  40 mg Intravenous Q12H      dextrose 10%         Assessment/Plan:    H/o RHD,s/p bio AVR and MV repair 2008  Carotid artery stenosis s/p stent placement x 2 10/15/2024  On brilinta - consider restart ASA d/t stroke risk  No current bleeding reported - Hgb stable  Chronic afib  Ventricular rates controlled - continue metoprolol  Hx on Eliquis - held for now given bleeding risk  Hx of head and neck cancer s/p radiation with G tube in place  Acute on chronic anemia  Declined endoscopy  Concern for GIB, continue to monitor  Hgb stable today  Chronically ill and cachectic  Hypokalemia  Tx per cardiac electrolyte protocol  Aspiration PNA  SMOOTH  Resolved    PLAN  Continue metoprolol 12.5 mg TID via G-tube - IV diltiazem as needed  Continue Brilinta and ASA 81 mg dailyvia G-tube - reviewed risk of CVA Vs risk of bleeding in detail again today.  Holding Eliquis d/t bleeding risk  Follow Hgb and monitor for bleeding closely - if recurrent epitaxis consider ENT consult        Juventino Herron PA-C  10/30/2024  09:02 AM    Patient seen and examined independently.  Note reviewed and labs reviewed. Agree with above assessment and plan.  78-year-old male who presented after recent TCAR.  Course has since been complicated by development of bleeding as well as concern for aspiration.  It appeared the patient had a nosebleed but notes improvement thereafter.  Aspirin, Brilinta regimen was resumed given recent intervention to carotid artery.  At this time, Eliquis has been [place].  Patient is understanding of risk of bleeding but also the risk of not being on antiplatelet regimen.  Monitor today.  Hemoglobin stable.  Will continue to follow.        Yury Ware DO  Cardiologist  [facility]  10/30/2024 12:32 PM      Note to the patient:  The 21st Century Cures Act makes medical notes like these available to patients in the interest of transparency. However, be advised that this is a medical document. It is intended as peer to peer communication. It is written in medical language and may contain abbreviations or verbiage that are unfamiliar. It may appear blunt or direct. Medical documents are intended to carry relevant information, facts as evident, and clinical opinion of the practitioner.     Disclaimer: Components of this note were documented using voice recognition system and are subject to errors not corrected at proofreading. Contact the author of this note for any clarifications.

## 2024-10-30 NOTE — PLAN OF CARE
Received patient at 0730. Alert and oriented x4. Tele rhythm afib. O2 saturation >90% on 1L o2 per NC. Bubbler added for comfort, epistaxis. Breath sounds congested. Bed is locked and in low position. Call light and personal belongings in reach. No c/o chest pain, some shortness of breath with activity. Pt voiding with no issue. Pt ambulated in room with no issues. Care plan reviewed, pt verbalizes understanding. Wife updated on POC.

## 2024-10-31 NOTE — DISCHARGE SUMMARY
[facility]     Wang Romero Patient Status:  Inpatient    1945 MRN [id number]   Location [facility] 8NE-A Attending No att. providers found   Hosp Day # 4 PCP Colette Steele MD     Date of Admission: 10/27/2024  Date of Discharge:  10/31/2024     Discharge Disposition: Home or Self Care    Discharge Diagnosis:  Pneumonia  ABLA, GIB      Brief Synopsis:     Patient was admitted, he was evaluated by GI who discussed endoscopic workup which patient declined, his Eliquis was stopped, cardiology followed in consultation, he was started on metoprolol for his atrial fibrillation and continued on his dual antiplatelet therapy.  He was treated for pneumonia with antibiotics which she will continue upon discharge.  Vascular surgery also saw patient and he will have outpatient carotid duplex.  Hemoglobin stabilized and bleeding seem to resolve on its own.  Patient discharged home in stable condition.    Lace+ Score: 75  59-90 High Risk  29-58 Medium Risk  0-28   Low Risk       TCM Follow-Up Recommendation:  LACE > 58: High Risk of readmission after discharge from the hospital.      Procedures during hospitalization:   N/a    Incidental or significant findings and recommendations (brief descriptions):  N/a    Lab/Test results pending at Discharge:   N/a    Consultants:  Cardiology, CV surgery, GI    Discharge Medication List:     Discharge Medications        START taking these medications        Instructions Prescription details   ciprofloxacin 500 MG Tabs  Commonly known as: Cipro      Take 1 tablet (500 mg total) by mouth 2 (two) times daily for 9 days.   Stop taking on: 2024  Quantity: 17 tablet  Refills: 0     metoprolol tartrate 25 MG Tabs  Commonly known as: Lopressor      0.5 tablets (12.5 mg total) by Per G Tube route TID Beta Blocker/Cardiac.   Quantity: 90 tablet  Refills: 3     pantoprazole 40 MG Tbec  Commonly known as: Protonix      Take 1 tablet (40 mg  total) by mouth 2 (two) times daily before meals.   Quantity: 60 tablet  Refills: 0            CONTINUE taking these medications        Instructions Prescription details   aspirin 81 MG Tbec      Take 1 tablet (81 mg total) by mouth daily.   Refills: 0     levothyroxine 50 MCG Tabs  Commonly known as: Synthroid      1 tablet (50 mcg total) by Per G Tube route before breakfast.   Quantity:    Refills: 0     simvastatin 20 MG Tabs  Commonly known as: Zocor      Take 1 tablet (20 mg total) by mouth nightly.   Refills: 0     ticagrelor 90 MG Tabs  Commonly known as: Brilinta      Take 1 tablet (90 mg total) by mouth every 12 (twelve) hours.   Refills: 0            STOP taking these medications      cephALEXin 250 MG/5ML Susr  Commonly known as: Keflex                  Where to Get Your Medications        These medications were sent to [id number] - [place], [place] - [address] AT [facility] [address], [phone], [phone] [address], [facility] [address]      Phone: [phone]   ciprofloxacin 500 MG Tabs  metoprolol tartrate 25 MG Tabs  pantoprazole 40 MG Tbec         [place] reviewed: n/a    Follow-up appointment:   Donavan Wilson MD  [address] [facility]   [place] [address]  [phone]    Follow up in 1 month(s)      Colette Steele MD  [address] [facility] [address]  [phone]    Schedule an appointment as soon as possible for a visit in 1 week(s)      Heath Chamberlain MD  [address] [address] [phone]    Follow up in 1 week(s)  Office will call you for follow up appt    Wang Jones MD  [address] [place] [address]  [phone]    Follow up      Appointments for Next 30 Days 10/31/2024 - 11/30/2024      None            Vital signs:  Temp:  [97.4 °F (36.3 °C)-98.4 °F (36.9 °C)] 98 °F (36.7 °C)  Pulse:  [] 87  Resp:  [18-20] 18  BP: ()/(67-81) 118/81  SpO2:  [90 %-98 %]  95 %    Physical Exam:    General: No acute distress   Lungs: clear to auscultation  Cardiovascular: S1, S2  Abdomen: Soft      -----------------------------------------------------------------------------------------------  PATIENT DISCHARGE INSTRUCTIONS: See electronic chart    Annabella Lim DO    Total time spent on discharge plannin minutes     The  [place] Cures Act makes medical notes like these available to patients in the interest of transparency. Please be advised this is a medical document. Medical documents are intended to carry relevant information, facts as evident, and the clinical opinion of the practitioner. The medical note is intended as peer to peer communication and may appear blunt or direct. It is written in medical language and may contain abbreviations or verbiage that are unfamiliar.

## 2024-10-31 NOTE — PHYSICAL THERAPY NOTE
Attempted to see Pt this AM - RN aware of attempt.  Pt refused to walk with writer, but reported he has walked in the room and did a lap earlier.  Pt in agreement with PT at discharge.  Pt's spouse prefers OP PT.  Ordered filled per request. SW aware.     Will f/u later today if time permits, after all other patients are attempted per tentative schedule.

## 2024-10-31 NOTE — CONSULTS
[facility]    PATIENT'S NAME: DA BISHOP   ATTENDING PHYSICIAN: Annabella Lim DO   CONSULTING PHYSICIAN: Da Jones M.D.   PATIENT ACCOUNT#:   [id number]    LOCATION:  [address] [place] [address]  MEDICAL RECORD #:   [id number]       YOB: 1945  ADMISSION DATE:       10/27/2024      CONSULT DATE:  10/31/2024    REPORT OF CONSULTATION    FOLLOWUP CONSULTATION    Patient is sitting up in a chair, no complaints, breathing well, not on oxygen.  Vital signs are stable; he is afebrile.  The chart was reviewed.  He is still on Brilinta 90 mg b.i.d.  He is on baby aspirin.  His hemoglobin seems to be stable.  He had an episode of epistaxis the other day but seems to be stable also from that.  At this time, management per the primary service, is resolving from his pneumonia, and discharge when okay with the other doctors.  He is aware he is supposed to have a duplex ultrasound of the carotid arteries in about 2 to 3 weeks.    Dictated By Da Jones M.D.  d: 10/31/2024 13:10:59  t: 10/31/2024 13:55:04  Job [id number]  JJW/    cc: Da Jones M.D.

## 2024-10-31 NOTE — PLAN OF CARE
Assumed care at 0730; Pt A/o x 4, stable VS and no complaints of pain. Saturating well on RA; Plan of care discussed with patient. Educated on fall risk and use of call light. Belongings and call light within reach. Bed/chair alarm activated; Bed at lowest position and locked.     Problem: SAFETY ADULT - FALL  Goal: Free from fall injury  Description: INTERVENTIONS:  - Assess pt frequently for physical needs  - Identify cognitive and physical deficits and behaviors that affect risk of falls.  - [place] fall precautions as indicated by assessment.  - Educate pt/family on patient safety including physical limitations  - Instruct pt to call for assistance with activity based on assessment  - Modify environment to reduce risk of injury  - Provide assistive devices as appropriate  - Consider OT/PT consult to assist with strengthening/mobility  - Encourage toileting schedule  10/31/2024 0927 by Dale Larry RN  Outcome: Progressing  10/31/2024 0927 by Dale Larry RN  Outcome: Progressing  10/31/2024 0927 by Dale Larry RN  Outcome: Progressing     Problem: DISCHARGE PLANNING  Goal: Discharge to home or other facility with appropriate resources  Description: INTERVENTIONS:  - Identify barriers to discharge w/pt and caregiver  - Include patient/family/discharge partner in discharge planning  - Arrange for needed discharge resources and transportation as appropriate  - Identify discharge learning needs (meds, wound care, etc)  - Arrange for interpreters to assist at discharge as needed  - Consider post-discharge preferences of patient/family/discharge partner  - Complete POLST form as appropriate  - Assess patient's ability to be responsible for managing their own health  - Refer to Case Management Department for coordinating discharge planning if the patient needs post-hospital services based on physician/LIP order or complex needs related to functional status, cognitive ability or social support system  10/31/2024  0927 by Dale Larry RN  Outcome: Progressing  10/31/2024 0927 by Dale Larry RN  Outcome: Progressing  10/31/2024 0927 by Dale Larry RN  Outcome: Progressing     Problem: Altered Communication/Language Barrier  Goal: Patient/Family is able to understand and participate in their care  Description: Interventions:  - Assess communication ability and preferred communication style  - Implement communication aides and strategies  - Use visual cues when possible  - Listen attentively, be patient, do not interrupt  - Minimize distractions  - Allow time for understanding and response  - Establish method for patient to ask for assistance (call light)  - Provide an  as needed  - Communicate barriers and strategies to overcome with those who interact with patient  10/31/2024 0927 by Dale Larry RN  Outcome: Progressing  10/31/2024 0927 by Dale Larry RN  Outcome: Progressing  10/31/2024 0927 by Dale Larry RN  Outcome: Progressing     Problem: GASTROINTESTINAL - ADULT  Goal: Maintains or returns to baseline bowel function  Description: INTERVENTIONS:  - Assess bowel function  - Maintain adequate hydration with IV or PO as ordered and tolerated  - Evaluate effectiveness of GI medications  - Encourage mobilization and activity  - Obtain nutritional consult as needed  - Establish a toileting routine/schedule  - Consider collaborating with pharmacy to review patient's medication profile  10/31/2024 0927 by Dale Larry RN  Outcome: Progressing  10/31/2024 0927 by Dale Larry RN  Outcome: Progressing  10/31/2024 0927 by Dale Larry RN  Outcome: Progressing     Problem: RESPIRATORY - ADULT  Goal: Achieves optimal ventilation and oxygenation  Description: INTERVENTIONS:  - Assess for changes in respiratory status  - Assess for changes in mentation and behavior  - Position to facilitate oxygenation and minimize respiratory effort  - Oxygen supplementation based on oxygen saturation or ABGs  - Provide  Smoking Cessation handout, if applicable  - Encourage broncho-pulmonary hygiene including cough, deep breathe, Incentive Spirometry  - Assess the need for suctioning and perform as needed  - Assess and instruct to report SOB or any respiratory difficulty  - Respiratory Therapy support as indicated  - Manage/alleviate anxiety  - Monitor for signs/symptoms of CO2 retention  10/31/2024 0927 by Dale Larry RN  Outcome: Progressing  10/31/2024 0927 by Dale Larry RN  Outcome: Progressing  10/31/2024 0927 by Dale Larry RN  Outcome: Progressing     Problem: Diabetes/Glucose Control  Goal: Glucose maintained within prescribed range  Description: INTERVENTIONS:  - Monitor Blood Glucose as ordered  - Assess for signs and symptoms of hyperglycemia and hypoglycemia  - Administer ordered medications to maintain glucose within target range  - Assess barriers to adequate nutritional intake and initiate nutrition consult as needed  - Instruct patient on self management of diabetes  10/31/2024 0927 by Dale Larry RN  Outcome: Progressing  10/31/2024 0927 by Dale Larry RN  Outcome: Progressing  10/31/2024 0927 by Dale Larry RN  Outcome: Progressing

## 2024-10-31 NOTE — PLAN OF CARE
Pt cleared by attending and consults. Tele monitor and IV line removed. Discharge papers, education sheets and belongings sent home with patient.     Problem: SAFETY ADULT - FALL  Goal: Free from fall injury  Description: INTERVENTIONS:  - Assess pt frequently for physical needs  - Identify cognitive and physical deficits and behaviors that affect risk of falls.  - [place] fall precautions as indicated by assessment.  - Educate pt/family on patient safety including physical limitations  - Instruct pt to call for assistance with activity based on assessment  - Modify environment to reduce risk of injury  - Provide assistive devices as appropriate  - Consider OT/PT consult to assist with strengthening/mobility  - Encourage toileting schedule  10/31/2024 1442 by Dale Larry RN  Outcome: Completed  10/31/2024 0927 by Dale Larry RN  Outcome: Progressing  10/31/2024 0927 by Dale Larry RN  Outcome: Progressing  10/31/2024 0927 by Dale Larry RN  Outcome: Progressing     Problem: DISCHARGE PLANNING  Goal: Discharge to home or other facility with appropriate resources  Description: INTERVENTIONS:  - Identify barriers to discharge w/pt and caregiver  - Include patient/family/discharge partner in discharge planning  - Arrange for needed discharge resources and transportation as appropriate  - Identify discharge learning needs (meds, wound care, etc)  - Arrange for interpreters to assist at discharge as needed  - Consider post-discharge preferences of patient/family/discharge partner  - Complete POLST form as appropriate  - Assess patient's ability to be responsible for managing their own health  - Refer to Case Management Department for coordinating discharge planning if the patient needs post-hospital services based on physician/LIP order or complex needs related to functional status, cognitive ability or social support system  10/31/2024 1442 by Dale Larry, YUDELKA  Outcome: Completed  10/31/2024 0927 by Laron  YUDELKA Hunter  Outcome: Progressing  10/31/2024 0927 by Dale Larry RN  Outcome: Progressing  10/31/2024 0927 by Dale Larry RN  Outcome: Progressing     Problem: Altered Communication/Language Barrier  Goal: Patient/Family is able to understand and participate in their care  Description: Interventions:  - Assess communication ability and preferred communication style  - Implement communication aides and strategies  - Use visual cues when possible  - Listen attentively, be patient, do not interrupt  - Minimize distractions  - Allow time for understanding and response  - Establish method for patient to ask for assistance (call light)  - Provide an  as needed  - Communicate barriers and strategies to overcome with those who interact with patient  10/31/2024 1442 by Dale Larry RN  Outcome: Completed  10/31/2024 0927 by Dale Larry RN  Outcome: Progressing  10/31/2024 0927 by Dale Larry RN  Outcome: Progressing  10/31/2024 0927 by Dale Larry RN  Outcome: Progressing     Problem: GASTROINTESTINAL - ADULT  Goal: Maintains or returns to baseline bowel function  Description: INTERVENTIONS:  - Assess bowel function  - Maintain adequate hydration with IV or PO as ordered and tolerated  - Evaluate effectiveness of GI medications  - Encourage mobilization and activity  - Obtain nutritional consult as needed  - Establish a toileting routine/schedule  - Consider collaborating with pharmacy to review patient's medication profile  10/31/2024 1442 by Dale Larry RN  Outcome: Completed  10/31/2024 0927 by Dale Larry RN  Outcome: Progressing  10/31/2024 0927 by Dale Larry RN  Outcome: Progressing  10/31/2024 0927 by Dale Larry RN  Outcome: Progressing     Problem: RESPIRATORY - ADULT  Goal: Achieves optimal ventilation and oxygenation  Description: INTERVENTIONS:  - Assess for changes in respiratory status  - Assess for changes in mentation and behavior  - Position to facilitate oxygenation and minimize  respiratory effort  - Oxygen supplementation based on oxygen saturation or ABGs  - Provide Smoking Cessation handout, if applicable  - Encourage broncho-pulmonary hygiene including cough, deep breathe, Incentive Spirometry  - Assess the need for suctioning and perform as needed  - Assess and instruct to report SOB or any respiratory difficulty  - Respiratory Therapy support as indicated  - Manage/alleviate anxiety  - Monitor for signs/symptoms of CO2 retention  10/31/2024 1442 by Dale Larry RN  Outcome: Completed  10/31/2024 0927 by Dale Larry RN  Outcome: Progressing  10/31/2024 0927 by Dale Larry RN  Outcome: Progressing  10/31/2024 0927 by Dale Larry RN  Outcome: Progressing     Problem: Diabetes/Glucose Control  Goal: Glucose maintained within prescribed range  Description: INTERVENTIONS:  - Monitor Blood Glucose as ordered  - Assess for signs and symptoms of hyperglycemia and hypoglycemia  - Administer ordered medications to maintain glucose within target range  - Assess barriers to adequate nutritional intake and initiate nutrition consult as needed  - Instruct patient on self management of diabetes  10/31/2024 1442 by Dale Larry RN  Outcome: Completed  10/31/2024 0927 by Dale Larry RN  Outcome: Progressing  10/31/2024 0927 by Dale Larry RN  Outcome: Progressing  10/31/2024 0927 by Dale Larry RN  Outcome: Progressing

## 2024-10-31 NOTE — PLAN OF CARE
Assumed care at 1930. Patient alert and orientated x 4. Aspiration precautions in place. Maintaining oxygen saturations on room air, 1L nasal cannula for comfort. Lung sounds: crackles with inspiratory wheezes. Oral suction at bedside, draining pink-tinged secretions. A-Fib on tele. Rates controlled. Continent of bowel and bladder. G-tube to RLQ, dry/intact. No active bleeding noted. Denies pain. Up x 1 assist with the walker. Patient updated with plan of care. Call light within reach. Plan: IV Protonix BID, Cipro via g-tube, monitor labs    Problem: SAFETY ADULT - FALL  Goal: Free from fall injury  Description: INTERVENTIONS:  - Assess pt frequently for physical needs  - Identify cognitive and physical deficits and behaviors that affect risk of falls.  - [facility] fall precautions as indicated by assessment.  - Educate pt/family on patient safety including physical limitations  - Instruct pt to call for assistance with activity based on assessment  - Modify environment to reduce risk of injury  - Provide assistive devices as appropriate  - Consider OT/PT consult to assist with strengthening/mobility  - Encourage toileting schedule  Outcome: Progressing     Problem: DISCHARGE PLANNING  Goal: Discharge to home or other facility with appropriate resources  Description: INTERVENTIONS:  - Identify barriers to discharge w/pt and caregiver  - Include patient/family/discharge partner in discharge planning  - Arrange for needed discharge resources and transportation as appropriate  - Identify discharge learning needs (meds, wound care, etc)  - Arrange for interpreters to assist at discharge as needed  - Consider post-discharge preferences of patient/family/discharge partner  - Complete POLST form as appropriate  - Assess patient's ability to be responsible for managing their own health  - Refer to Case Management Department for coordinating discharge planning if the patient needs post-hospital services based on  physician/LIP order or complex needs related to functional status, cognitive ability or social support system  Outcome: Progressing     Problem: Altered Communication/Language Barrier  Goal: Patient/Family is able to understand and participate in their care  Description: Interventions:  - Assess communication ability and preferred communication style  - Implement communication aides and strategies  - Use visual cues when possible  - Listen attentively, be patient, do not interrupt  - Minimize distractions  - Allow time for understanding and response  - Establish method for patient to ask for assistance (call light)  - Provide an  as needed  - Communicate barriers and strategies to overcome with those who interact with patient  Outcome: Progressing     Problem: GASTROINTESTINAL - ADULT  Goal: Maintains or returns to baseline bowel function  Description: INTERVENTIONS:  - Assess bowel function  - Maintain adequate hydration with IV or PO as ordered and tolerated  - Evaluate effectiveness of GI medications  - Encourage mobilization and activity  - Obtain nutritional consult as needed  - Establish a toileting routine/schedule  - Consider collaborating with pharmacy to review patient's medication profile  Outcome: Progressing     Problem: RESPIRATORY - ADULT  Goal: Achieves optimal ventilation and oxygenation  Description: INTERVENTIONS:  - Assess for changes in respiratory status  - Assess for changes in mentation and behavior  - Position to facilitate oxygenation and minimize respiratory effort  - Oxygen supplementation based on oxygen saturation or ABGs  - Provide Smoking Cessation handout, if applicable  - Encourage broncho-pulmonary hygiene including cough, deep breathe, Incentive Spirometry  - Assess the need for suctioning and perform as needed  - Assess and instruct to report SOB or any respiratory difficulty  - Respiratory Therapy support as indicated  - Manage/alleviate anxiety  - Monitor for  signs/symptoms of CO2 retention  Outcome: Progressing     Problem: Diabetes/Glucose Control  Goal: Glucose maintained within prescribed range  Description: INTERVENTIONS:  - Monitor Blood Glucose as ordered  - Assess for signs and symptoms of hyperglycemia and hypoglycemia  - Administer ordered medications to maintain glucose within target range  - Assess barriers to adequate nutritional intake and initiate nutrition consult as needed  - Instruct patient on self management of diabetes  Outcome: Progressing

## 2024-10-31 NOTE — PROGRESS NOTES
Progress Note  Wang Romero Patient Status:  Inpatient    1945 MRN [id number]   Location [facility] 8NE-A Attending Annabella Lim, DO   Hosp Day # 4 PCP Colette Steele MD     Subjective:  No acute events overnight.  Sitting up in a chair, on room air, denies any cardiac symptoms currently.  No s/s of active bleeding.  Wants to go home today.        Objective:  /67 (BP Location: Right arm)   Pulse 107   Temp 97.8 °F (36.6 °C) (Oral)   Resp 18   Ht 5' 10\" (1.778 m)   Wt 134 lb 0.6 oz (60.8 kg)   SpO2 92%   BMI 19.23 kg/m²     Telemetry: A-fib, HR 80-90s      Intake/Output:    Intake/Output Summary (Last 24 hours) at 10/31/2024 1125  Last data filed at 10/31/2024 0900  Gross per 24 hour   Intake 940 ml   Output 925 ml   Net 15 ml       Last 3 Weights   10/30/24 0500 134 lb 0.6 oz (60.8 kg)   10/29/24 0711 134 lb (60.8 kg)   10/28/24 0600 139 lb 15.9 oz (63.5 kg)   10/27/24 0909 142 lb 6.7 oz (64.6 kg)   10/27/24 0301 125 lb (56.7 kg)   10/23/24 2232 130 lb (59 kg)   10/22/24 0650 130 lb (59 kg)       Labs:  Recent Labs   Lab 10/28/24  0428 10/29/24  0441 10/30/24  0520 10/31/24  0534   * 125* 122*  --    BUN 37* 29* 25*  --    CREATSERUM 1.01 0.92 0.94  --    EGFRCR 75 84 82  --    CA 9.3 9.2 9.3  --     144 143  --    K 3.6 3.4* 3.4*  3.4* 4.0    106 107  --    CO2 29.0 30.0 30.0  --      Recent Labs   Lab 10/28/24  0428 10/28/24  1157 10/28/24  2008 10/29/24  0441 10/30/24  0520   RBC 2.61*  --   --  2.60* 2.67*   HGB 8.0*   < > 8.2* 8.2*  8.2* 8.1*   HCT 24.2*   < > 25.2* 25.0*  24.6* 26.5*   MCV 92.7  --   --  94.6 99.3   MCH 30.7  --   --  31.5 30.3   MCHC 33.1  --   --  33.3 30.6*   RDW 16.1  --   --  15.9 15.6   NEPRELIM 5.06  --   --  6.31 6.07   WBC 6.1  --   --  7.3 7.2   .0  --   --  231.0 260.0    < > = values in this interval not displayed.         Recent Labs   Lab 10/27/24  0340   TROPHS 36       Review of Systems   Constitutional: Negative.    Cardiovascular:  Positive for irregular heartbeat.   Respiratory: Negative.         Physical Exam:    Gen: alert, oriented x 3, NAD  Heent: pupils equal, reactive. Mucous membranes moist.   Neck: no jvd  Cardiac: irregular rate and rhythm, normal S1,S2, 1/6 systolic murmur, no gallop or rub   Lungs: fine crackles on the LLL, on O2 at 1L  Abd: soft, NT/ND +bs  Ext: no edema  Skin: Warm, dry  Neuro: No focal deficits        Medications:     ciprofloxacin  500 mg Oral BID @ 0700, 2100    aspirin  81 mg Per G Tube Daily    metoprolol tartrate  12.5 mg Per G Tube TID Beta Blocker/Cardiac    atorvastatin  10 mg Per G Tube Nightly    levothyroxine  50 mcg Per G Tube Before breakfast    ticagrelor  90 mg Per G Tube Q12H    pantoprazole  40 mg Intravenous Q12H      dextrose 10%       Assessment:  H/o RHD,s/p bio AVR and MV repair 2008  Carotid artery stenosis s/p stent placement x 2 10/15/2024  On brilinta - consider restart ASA d/t stroke risk  No current bleeding reported - Hgb stable  Chronic afib  Ventricular rates controlled - continue metoprolol  Historically on Eliquis - held for now given bleeding risk  Hx of head and neck cancer s/p radiation with G tube in place  Acute on chronic anemia  Declined endoscopy  Concern for GIB, continue to monitor  Hgb stable today  Chronically ill and cachectic  Hypokalemia  Tx per cardiac electrolyte protocol  Aspiration PNA  SMOOTH  Resolved    Plan:  Denies cardiac symptoms.  A-fib, rates controlled -continue lopressor.  Continue asa, Brilinta, statin.  Continue to hold Eliquis, plan to hold for at least 30 days per vascular surgery.   Patient is asking to be dc home today, if hgb remains stable there is no objection from cardiology perspective if ok with other services.  Will schedule follow up visit with Dr Bulmaro palmer in 1 week, will check CBC and BMP prior the visit.         Plan of care discussed with patient, YUDELKA.    Lisset Alcocer, TIFFANY  10/31/2024  11:25  AM  [phone]      Patient seen and examined independently.  Note reviewed and labs reviewed. Agree with above assessment and plan.  78-year-old male who presented after recent TCAR.  Course C/B bleeding as well as concern for aspiration.  Holding Eliquis for approximately 30 days per vascular surgery recommendations.  Patient to continue aspirin, Brilinta antiplatelet regimen along with statin therapy.        Yury Ware DO  Cardiologist  [facility]  10/31/2024 1:54 PM      Note to the patient: The 21st Century Cures Act makes medical notes like these available to patients in the interest of transparency. However, be advised that this is a medical document. It is intended as peer to peer communication. It is written in medical language and may contain abbreviations or verbiage that are unfamiliar. It may appear blunt or direct. Medical documents are intended to carry relevant information, facts as evident, and clinical opinion of the practitioner.     Disclaimer: Components of this note were documented using voice recognition system and are subject to errors not corrected at proofreading. Contact the author of this note for any clarifications.

## 2024-11-01 NOTE — PAYOR COMM NOTE
--------------  DISCHARGE REVIEW    Payor: [employer] MEDICARE ADV PPO  Subscriber #:  [id number]  Authorization Number: [id number]    Admit date: 10/27/24  Admit time:   9:03 AM  Discharge Date: 10/31/2024  1:55 PM     Admitting Physician: Hema Crouch DO  Attending Physician:  No att. providers found  Primary Care Physician: Colette Steele MD          Discharge Summary Notes        Discharge Summary signed by Annabella Lim DO at 10/31/2024  3:30 PM       Author: Annabella Lim DO Specialty: HOSPITALIST, Internal Medicine Author Type: Physician    Filed: 10/31/2024  3:30 PM Date of Service: 10/31/2024  3:27 PM Status: Signed    : Annabella Lim DO (Physician)           [facility]     Wang Romero Patient Status:  Inpatient    1945 MRN [id number]   Location [address] Attending No att. providers found   Hosp Day # 4 PCP Colette Steele MD     Date of Admission: 10/27/2024  Date of Discharge:  10/31/2024     Discharge Disposition: Home or Self Care    Discharge Diagnosis:  Pneumonia  ABLA, GIB      Brief Synopsis:     Patient was admitted, he was evaluated by GI who discussed endoscopic workup which patient declined, his Eliquis was stopped, cardiology followed in consultation, he was started on metoprolol for his atrial fibrillation and continued on his dual antiplatelet therapy.  He was treated for pneumonia with antibiotics which she will continue upon discharge.  Vascular surgery also saw patient and he will have outpatient carotid duplex.  Hemoglobin stabilized and bleeding seem to resolve on its own.  Patient discharged home in stable condition.    Lace+ Score: 75  59-90 High Risk  29-58 Medium Risk  0-28   Low Risk       TCM Follow-Up Recommendation:  LACE > 58: High Risk of readmission after discharge from the hospital.      Procedures during hospitalization:   N/a    Incidental or significant findings and recommendations (brief  descriptions):  N/a    Lab/Test results pending at Discharge:   N/a    Consultants:  Cardiology, CV surgery, GI    Discharge Medication List:     Discharge Medications        START taking these medications        Instructions Prescription details   ciprofloxacin 500 MG Tabs  Commonly known as: Cipro      Take 1 tablet (500 mg total) by mouth 2 (two) times daily for 9 days.   Stop taking on: November 9, 2024  Quantity: 17 tablet  Refills: 0     metoprolol tartrate 25 MG Tabs  Commonly known as: Lopressor      0.5 tablets (12.5 mg total) by Per G Tube route TID Beta Blocker/Cardiac.   Quantity: 90 tablet  Refills: 3     pantoprazole 40 MG Tbec  Commonly known as: Protonix      Take 1 tablet (40 mg total) by mouth 2 (two) times daily before meals.   Quantity: 60 tablet  Refills: 0            CONTINUE taking these medications        Instructions Prescription details   aspirin 81 MG Tbec      Take 1 tablet (81 mg total) by mouth daily.   Refills: 0     levothyroxine 50 MCG Tabs  Commonly known as: Synthroid      1 tablet (50 mcg total) by Per G Tube route before breakfast.   Quantity:    Refills: 0     simvastatin 20 MG Tabs  Commonly known as: Zocor      Take 1 tablet (20 mg total) by mouth nightly.   Refills: 0     ticagrelor 90 MG Tabs  Commonly known as: Brilinta      Take 1 tablet (90 mg total) by mouth every 12 (twelve) hours.   Refills: 0            STOP taking these medications      cephALEXin 250 MG/5ML Susr  Commonly known as: Keflex                  Where to Get Your Medications        These medications were sent to [id number] - [place], [place] - [address] AT [facility] [address], [phone], [phone]  [address], [facility] [address]      Phone: [phone]   ciprofloxacin 500 MG Tabs  metoprolol tartrate 25 MG Tabs  pantoprazole 40 MG Tbec         [place] reviewed[place] n/a    Follow-up appointment:   Donavan Wilson MD  [address] [place]  [address]  [phone]    Follow up in 1 month(s)      Colette Steele MD  [address] [place] [id number] [phone]    Schedule an appointment as soon as possible for a visit in 1 week(s)      Heath Chamberlain MD  [address] [address]  [phone]    Follow up in 1 week(s)  Office will call you for follow up appt    Wang Jones MD  [address] [address]  [phone]    Follow up      Appointments for Next 30 Days 10/31/2024 - 2024      None            Vital signs:  Temp:  [97.4 °F (36.3 °C)-98.4 °F (36.9 °C)] 98 °F (36.7 °C)  Pulse:  [] 87  Resp:  [18-20] 18  BP: ()/(67-81) 118/81  SpO2:  [90 %-98 %] 95 %    Physical Exam:    General: No acute distress   Lungs: clear to auscultation  Cardiovascular: S1, S2  Abdomen: Soft      -----------------------------------------------------------------------------------------------  PATIENT DISCHARGE INSTRUCTIONS: See electronic chart    Annabella Lim DO    Total time spent on discharge plannin minutes     The  Century Cures Act makes medical notes like these available to patients in the interest of transparency. Please be advised this is a medical document. Medical documents are intended to carry relevant information, facts as evident, and the clinical opinion of the practitioner. The medical note is intended as peer to peer communication and may appear blunt or direct. It is written in medical language and may contain abbreviations or verbiage that are unfamiliar.       Electronically signed by Annabella Lim DO on 10/31/2024  3:30 PM         REVIEWER COMMENTS

## 2024-11-06 ENCOUNTER — HOSPITAL ENCOUNTER (OUTPATIENT)
Dept: LAB | Facility: HOSPITAL | Age: 79
Discharge: HOME | End: 2024-11-06
Payer: MEDICARE

## 2024-11-06 ENCOUNTER — TELEPHONE (OUTPATIENT)
Dept: INTERNAL MEDICINE | Age: 79
End: 2024-11-06

## 2024-11-13 ENCOUNTER — TELEPHONE (OUTPATIENT)
Dept: INTERNAL MEDICINE | Age: 79
End: 2024-11-13

## 2024-11-13 PROBLEM — A41.9 SEPSIS DUE TO PNEUMONIA (HCC): Status: ACTIVE | Noted: 2024-01-01

## 2024-11-13 PROBLEM — I50.9 ACUTE ON CHRONIC CONGESTIVE HEART FAILURE, UNSPECIFIED HEART FAILURE TYPE (HCC): Status: ACTIVE | Noted: 2024-01-01

## 2024-11-13 PROBLEM — J18.9 SEPSIS DUE TO PNEUMONIA (HCC): Status: ACTIVE | Noted: 2024-01-01

## 2024-11-13 PROBLEM — I48.91 ATRIAL FIBRILLATION WITH RAPID VENTRICULAR RESPONSE (HCC): Status: ACTIVE | Noted: 2024-01-01

## 2024-11-13 NOTE — ED INITIAL ASSESSMENT (HPI)
PT brought in via EMS after wife called and said pt was fatigued with weakness. Hx of low hemoglobin and A. Fib on eliquis. Per wife carotid artery surgey 2 weeks ago. Peg tube upon arrival. Recent hx of pna and uti with productive cough.

## 2024-11-13 NOTE — ED QUICK NOTES
Orders for admission, patient is aware of plan and ready to go upstairs. Any questions, please call ED RN Trinh at extension [phone].     Patient Covid vaccination status: Fully vaccinated     COVID Test Ordered in ED: SARS-CoV-2/Flu A and B/RSV by PCR (GeneXpert)    COVID Suspicion at Admission: N/A    Running Infusions:   Cardizem 2.5ml/hr     Mental Status/LOC at time of transport: a/ox4    Other pertinent information: g-tube. Per Wife states pt runs systolic 90's-100 normally.    CIWA score: N/A   NIH score:  N/A

## 2024-11-13 NOTE — ED NOTES
Chief complaint of weakness per report, sx carotid artery 11/12. Hx A. Fib on Eliquis. Hx low hemoglobin.

## 2024-11-13 NOTE — SPIRITUAL CARE NOTE
Spiritual Care Visit Note    Patient Name: Wang Romero Date of Spiritual Care Visit: 24   : 1945 Primary Dx: <principal problem not specified>       Referred By:      Spiritual Care Taxonomy:    Intended Effects: Demonstrate caring and concern    Methods: Collaborate with care team member;Encouraging spiritual/Gnosticist practices;Offer spiritual/Gnosticist support    Interventions: Active listening;Ask guided questions;Explain  role;Prayer for healing    Visit Type/Summary:     - Spiritual Care: Consulted with RN prior to visit. Offered empathic listening and emotional support. Patient and family expressed appreciation for  visit. Provided information regarding how to contact Spiritual Care and left a Spiritual Care information card.  remains available as needed for follow up. Patient stated that he is [employer] and enjoys watching Judaism services on television. Patient is supported by his family.      Spiritual Care support can be requested via an Epic consult. For urgent/immediate needs, please contact the On Call  at: Edward: ext [phone]           Chaplain Resident Jannette Clifford MA

## 2024-11-13 NOTE — HISTORICAL OFFICE NOTE
Facility Logo [facility]  [address] [facility] [address], 4th [place] [address]  [phone]      Wang Romero  Progress Note  Demographics:  Name: Wang Romero YOB: 1945  Age: 78, Male Medical Record No: [id number]  Visited Date/Time: 11/08/2024 01:40 PM    Chief Complaints  Hospital follow up     SOB  Dyspnea on exertion  Chronic Atrial fibrillation  Bioprosthetic aortic valve for aortic stenosis  Mild coronary artery disease  Valvular cardiomyopathy  Chronic kidney disease  History of mitral valve repair    History of Present Illness  78-year-old gentleman coming to our clinic for follow-up.    Patient went to ER 3 times and was hospitalized twice since last clinic visit.  Prolonged visit and extended medical records were reviewed with the patient and his wife.    We asked for follow-up carotid ultrasound -September 2024 -it showed severe 80 to 99% stenosis in proximal right internal carotid artery and 60 to 79% stenosis in proximal left internal carotid artery.  Antegrade flow in vertebral arteries.    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    It led to consult with Dr. Jones vascular surgeon and patient underwent transcarotid artery revascularization/TCAR procedure with Dr. Jones and Dr. Brooks on October 15, 2024.    Patient was on Eliquis for chronic atrial fibrillation but was initiated on Brilinta after TCAR procedure and baby aspirin.  There was increased risk of bleeding.    Patient presented to ER with hematuria and epistaxis a week later on October 22, 2024.  Eliquis was discontinued and patient was left on Brilinta and aspirin 81 mg p.o. daily.  Patient was sent home from ER.    Then patient presented to ER again with dark stool and GI bleeding hemoglobin of 6.2 requiring blood transfusion on October 27, 2024.    Patient also dislocated his PEG tube and was in ER in October  2024.  Patient experienced aspiration and developed left-sided pneumonia.  He was treated with antibiotic as well in October 2024.  Patchy airspace disease in left upper lobe by x-ray.    Blood work during hospitalizations in October 2024 hemoglobin 6.2 up to 8.2 after blood transfusion -.  Platelet count 260 creatinine 0.9 from 1.27 potassium 4.0 after replacement from 3.4 sodium 143 glucose 125.  Normal liver enzymes with borderline AST.  proBNP 7099, multifactorial and troponin 36.  Lactic acid elevated 2.9.  Magnesium 2.2.    Repeated blood work -November 6, 2024 potassium 3.8 sodium 137 glucose 125 creatinine 0.86 BUN 24.  Hemoglobin 7.8.    Patient is followed follow-up with GI.  Patient declined scope in hospital.  He was exhausted with multiple admissions.  Eliquis was discontinued anyway.  Hemoglobin improved and he was discharged home and of October 2024.    No angina or congestive like symptoms just generalized weakness recovery from hospitalizations in October 2024 and he uses walker for ambulation.    EKG in the clinic -November 8, 2024 -A-fib 92 bpm with nonspecific changes but no acute ischemia narrow QRS.    Stable fatigue and stable mild dyspnea on exertion with deconditioning and different medical problems/comorbidity and malignancy history, with PEG tube feeding but no acute cardiopulmonary symptoms.    He underwent skin cancer removal on his face and forehead in 2023.    Patient has bioprosthetic aortic valve with mitral valve ring surgery in May 2008.  He had paroxysmal atrial fibrillation perioperatively while having dual valve surgery in May 2008.  Second episodes of A. fib happened when he had chemotherapy in 2010.  Then A. fib was usually happening with bronchitis or other medical issues exacerbation.  Then he progressed to chronic A. fib.  And was initiated on anticoagulation Eliquis in 2018 -Eliquis was held with bleeding issues after TCAR October 2024.    Follow-up echo Doppler -December  2023 -LVEF 60 to 65% with bioprosthetic aortic valve with 2+ perivalvular leak and trace regurgitation with a mean gradient of 15 mmHg and the valve area of 1 cm score.  4.2 cm ascending aorta proximal segment with normal aortic root size.  Status post mitral valve repair with ring.  1+ MR with calcified mitral valve.  No wall motion abnormalities and no LVH.  Mild to moderate mitral valve stenosis with mean gradient of 8 mmHg.  Pulmonary pressures mildly elevated at 37 mmHg.  Overall stable echo as compared to echo January 2022.  Mildly reduced RV function.  Of note in the past his PA pressure was 50 mmHg.    [facility] -only mild coronary artery disease angiographically in May 2008.    Patient has follow-up testing regarding his malignancy of the throat at [facility] and imaging was stable.  He has chronic dryness of his mouth.  He was advised not to eat orally due to aspiration risk and already hospitalization for aspiration pneumonias.  He underwent radiation to the neck.  Patient has PEG tube for feeding.    Patient's oncologist moved from [facility] to [facility] and patient follows now at [facility]. PEG tube feeding is followed at [facility]. No oral intake. Follow-up imaging was negative for malignancy.      ECG - November 22, 2022 -  -atrial fibrillation controlled at 97 bpm with minor nonspecific changes but no acute ischemia.  Narrow QRS and normal other intervals.  Q waves in V1 and V2.  Stable EKG as compared to EKG November 19, 2021.    No bleeding issues on Eliquis.    No myalgia on simvastatin.    We reviewed blood work with the patient -June 2024 cholesterol profile at goal with strong HDL of 84 and CMP was normal with potassium 3.9. -     Laboratory data -December 2023 -TSH 5.2 Free T41.0 Sodium 136 potassium 3.9 glucose 97 creatinine 0.96 BUN 23 and normal liver enzymes.    Stress echo September 2019 - for ischemia and he was in A. fib during stress test.      Patient was  hospitalized for septic shock with acute hypoxic respiratory failure due to aspiration pneumonia when he had dysphagia and decision was made about PEG tube feeding in November 2020.    Carotid ultrasound -June 2018 stable mild disease on the right side and moderate disease on the left side with widely patent vertebral arteries. -Stable ultrasound as compared to May 2016.    Hospitalization post mechanical fall for small subarachnoid hemorrhage in the left sylvian fissure in November 2019.  He slipped on the ice in her driveway.  No loss of consciousness.      Patient has stable ectasia of ascending aorta on different CT scans in prior years.    Patient has chronic mild thrombocytopenia and chronic mild anemia.  No bleeding issues on Eliquis given on combination of Eliquis or baby aspirin in the past.    Patient has history of squamous cell carcinoma and basal cell carcinoma removed from the nose head and forehead in 2014, 2021 -2023.    Currently has NYHA Class 2 symptoms.  Currently has NYHA Class 3 symptoms.  Cardiac risk factors Hypertension, Dyslipidemia and Former smoker  Past Medical History  1.Dilated cardiomyopathy  2.GI bleed  3.Benign hematuria  4.Coronary artery disease involving native coronary artery  5.Asymptomatic carotid artery stenosis, bilateral  6.Pure hypercholesterolemia  7.Chronic atrial fibrillation  8.Chronic renal insufficiency  9.Other iron deficiency anemias  10.History of smoking  11.Current use of long term anticoagulation  12.COPD (chronic obstructive pulmonary disease)  Past Surgical History  1.History of right common carotid artery stent placement  2.History of aortic valve replacement with bioprosthetic valve  3.H/O mitral valve repair  4.History of left heart catheterization (LHC)  5.PEG (percutaneous endoscopic gastrostomy) status  6.Cataract surgery, unspecified eye  Family History  No significant family history noted.  Social History  Smoking status Former [id number] (End  Date)  Tobacco usage - No (Ex-smoker (finding))  Review of systems  Constitutional Fatigue  Gastrointestinal No history of Abdominal pain  Cardiovascular NEAL  No history of Chest pain, Palpitations, Syncope, PND, Orthopnea, Edema and Claudication  Musculoskeletal Arthritis  No history of Arthralgias and Myalgias  Neurological Numbness, Poor balance and Unsteady gait  No history of Migraines, Limb weakness and Speech problems  Psychiatric Depression and Anxiety  Hem/Lymphatic Anemia  Integumentary No history of Rashes and Skin changes  Physical Examination  Vitals Right Arm Sitting  / 62 mmHg, Pulse rate 92 bpm, Irregular, Height in 5' 10\", BMI: 19.2, Weight in 134 lbs (or) 61 kgs and BSA : 1.72 cc/m²  General Appearance No Acute Distress  Head/Eyes/Ears/Nose/Mouth/Throat Conjunctiva pink, Sclera Clear, PERRLA, Mucous membranes Moist and No pallor  Neck Normal carotid pulsations, No carotid bruits and No JVD  Respiratory Unlabored and Lungs clear with normal breath sounds  Cardiovascular Intact distal pulses and Irregularly irregular rhythm. Normal rate present.  2/6 systolic murmur is noted .    Gastrointestinal Abdomen soft, Non-tender, Normoactive bowel sounds and No organomegaly  Gait Abnormal gait, Walks with walker and Wheelchair bound  Strength and tone Normal muscle strength  Lower Extremities Pulses 2+ and equal bilaterally and No edema  Skin Warm and dry and No rashes or lesions  Neurologic / Psychiatric Alert and Oriented  Speech Normal speech  Allergies  No known medication allergies.  Medications  1.Eliquis 5 mg tablet, Take 1 tablet orally 2 times a day.  2.levothyroxine (SYNTHROID, LEVOTHROID) 25 MCG tablet, 1 tablet daily.  3.metoprolol tartrate 25 mg tablet, Take one-half tablet orally 2 times a day.  4.simvastatin 20 mg tablet, Take 1 tablet on Monday, Wednesday, and Friday  5.tadalafil (CIALIS) 10 MG tablet, AS DIRECTED    Impression  1.Dilated cardiomyopathy  2.GI bleed  3.Benign  hematuria  4.Coronary artery disease involving native coronary artery  5.History of right common carotid artery stent placement  6.History of aortic valve replacement with bioprosthetic valve  7.Asymptomatic carotid artery stenosis, bilateral  8.Pure hypercholesterolemia  9.Chronic atrial fibrillation  10.Chronic renal insufficiency  11.Other iron deficiency anemias  12.History of smoking  13.H/O mitral valve repair  14.Current use of long term anticoagulation  15.COPD (chronic obstructive pulmonary disease)  16.PEG (percutaneous endoscopic gastrostomy) status  Assessment & Plan  1.  Patient had difficult clinical course with hospitalization for hematuria and GI bleeding and epistaxis after TCAR being on blood thinners all in October 2024.  2.  At present time he does not take baby aspirin and he does not take Eliquis and will stay on Brilinta 90 mg p.o. twice a day.  3.  Continue metoprolol to tartrate 12.5 mg p.o. twice daily and patient does not take 3 times daily.  With tenuous blood pressure and no tachycardia I think this is a good dose for now.  4.  Continue statin as ordered.  5.  At present time he needs to stay off Eliquis with hemoglobin of 7.8 down from 8.1 and recently had 6.2 requiring blood transfusion.  Will decide on blood thinners along the course and we can do aspirin 3 times a week on top of Brilinta but for now he was only on Brilinta with more risk than benefit from her blood thinners.  Also we can do combination of Eliquis and Plavix in the future but this all depends on clinical course and will talk to Dr. Jones about that to communicate together.  It was all discussed with the patient and his wife in detail.  6.  PEG tube feeding.  Patient is well-nourished with normal albumin and protein.  PEG tube feeding is followed at [facility].  No recurrence of neck malignancy.  8.  Echo Doppler next available will assess -bioprosthetic valve and heart pump function after 3 yue  hospitalizations.  Will make sure pneumonia and other medical issues did not affect cardiac structures and function.  9.  Antibiotic for pneumonia per medicine service and patient sees PCP next week.  Is still on antibiotics from hospital.  10.  CBC and BMP next week we have to follow kidney function and electrolytes with hemoglobin.  Anemia posthospitalization.  11.  Hypothyroidism followed by PCP.  Synthroid dose followed by PCP.  12.  Main reason for physical deconditioning is anemia with low hemoglobin after surgery and also recovering from pneumonia.  He has chronic fatigue and now went through 3 difficult hospitalizations.  13.  Follow-up with me in 2025.  Please cancel appointment in December since patient came sooner today.  Will see patient back in couple months.  14.  Follow-up with PCP and Dr. Jones vascular surgeon as scheduled.  Patient also has other side carotid disease and it has to be followed and addressed in the future.    Extended clinic visit and I reviewed multiple medical records from 3 hospitalizations.    All discussed with the patient and his wife in detail.  Copy to PCP.  Medications Ordered  1.Brilinta 90 mg tablet, Take 1 tablet orally 2 times a day.  Labs and Diagnostics ordered  1.EKG (electrocardiogram) (Today)  2.Echocardiography - Complete (Schedule next available)  Future appointments  1.Follow up visit - Heath Chamberlain MD (2 Months)  2.Referral Visit - Colette Steele ([email or web address]) : (Today)  Miscellaneous  1.Weight monitoring (regime/therapy)  Nurses documentation  Refills: none  Upcoming surgeries: none   Use of assisted devices: none  EKG: none  (AR, CMA)     Patient instructions  Medications:   1. Continue current medications.     Testin.Echocardiogram next available:  Your provider has ordered an echocardiogram. This is an ultrasound of your heart to further assess its function and valves. You may require an IV.  You will be required to  remove your shirt, no all in-in-ones, dresses or coveralls. This test takes approximately 45 to 60 minutes.   2.Labs Nonfasting CBC/BMP:  Your provider has ordered bloodwork  to be completed next week with the PCP. You do not need to be fasting for your blood work; you can eat and drink as desired.  You are allowed to take your medications with a sip of water.     Provider Follow Up:  1. Follow up with Dr. Chamberlain in 2 months    Please bring in you medication bottles or updated medicine list to your next appointment.  Call [facility] if you have any problems or concerns at [phone]   Lab Details  BASIC METABOLIC PANEL (8)  11/06/2024 11:46:20 AM  GLUCOSE 125 70-99 mg/dL H F  SODIUM 137 136-145 mmol/L  F  POTASSIUM 3.8 3.5-5.1 mmol/L  F  CHLORIDE 103  mmol/L  F  CO2 29.0 21.0-32.0 mmol/L  F  ANION GAP 5 0-18 mmol/L  F  BUN 24 9-23 mg/dL H F  CREATININE 0.96 0.70-1.30 mg/dL  F  CALCIUM 8.6 8.7-10.4 mg/dL L F  OSMOLALITY CALCULATED 290 275-295 mOsm/kg  F  E GFR CR 80 >=60 mL/min/1.73m2  F  FASTING PATIENT BMP ANSWER No   F  POCT CREATININE  09/26/2024 11:08:10 AM  POCT CREATININE 1.10 0.70-1.30 mg/dL  F  E GFR CR 68 >=60 mL/min/1.73m2  F  COMP METABOLIC PANEL (14)  06/07/2024 10:58:23 AM  GLUCOSE 95 70-99 mg/dL  F  SODIUM 138 136-145 mmol/L  F  POTASSIUM 3.9 3.5-5.1 mmol/L  F  CHLORIDE 101  mmol/L  F  CO2 31.0 21.0-32.0 mmol/L  F  ANION GAP 6 0-18 mmol/L  F  BUN 19 9-23 mg/dL  F  CREATININE 1.06 0.70-1.30 mg/dL  F  CALCIUM 9.4 8.5-10.1 mg/dL  F  OSMOLALITY CALCULATED 288 275-295 mOsm/kg  F  E GFR CR 71 >=60 mL/min/1.73m2  F  AST 34 15-37 U/L  F  ALT 26 16-61 U/L  F  ALKALINE PHOSPHATASE 116  U/L  F  BILIRUBIN, TOTAL 1.6 0.1-2.0 mg/dL  F  TOTAL PROTEIN 7.7 6.4-8.2 g/dL  F  ALBUMIN 3.8 3.4-5.0 g/dL  F  GLOBULIN 3.9 2.8-4.4 g/dL  F  A/G RATIO 1.0 1.0-2.0  F  FASTING PATIENT CMP ANSWER Yes   F  LIPID PANEL  06/07/2024 10:58:23 AM  CHOLESTEROL 147 <200 mg/dL  F  HDL CHOL 84 40-59  mg/dL H F  TRIGLYCERIDES 45  mg/dL  F  LDL CHOLESTROL 53 <100 mg/dL  F  VLDL 6 0-30 mg/dL  F  NON HDL CHOL 63 <130 mg/dL  F  FASTING PATIENT LIPID ANSWER Yes   F  Diagnostics Details  Carotid Ultrasound 09/17/2024  1.The study quality is good.    2.History of neck cancer and radiation.    3.80-99% stenosis in the proximal right internal carotid artery based on Bluth Criteria.    4.60-79% stenosis in the proximal left internal carotid artery based on Bluth Criteria.    5.Left external carotid artery elevated velocity of 206 cm/s, ratio 3.0. Suggesting less than 70% stenosis.    6.Antegrade right vertebral artery flow.    7.Antegrade left vertebral artery flow.    Trans Thoracic Echocardiogram 01/05/2022  1.The study quality is average.    2.The left ventricle is normal in size. Global left ventricular systolic function is normal. The left ventricular ejection fraction is 65%. Left ventricular diastolic function is indeterminate. with mitral valve surgical status but moderately reduced LV diastollic function by tissue doppler. Elevated LV filling pressures. No WMAs. No LVH.    3.The right ventricle is mildly enlarged. Right ventricular systolic function is mildly reduced.    4.The left atrial diameter is moderately increased. Left atrial diameter is 4.8 cms.    5.The right atrium is mildly enlarged.    6.S/p mitral valve repair with ring well seated. MV stenosis is mild to moderate . The area by pressure half time is 1.7 cm². The mean trans mitral gradient is 6.0 mmHg at HR of 93/min. Mitral regurgitation is trace. A calcified MV.    7.Bioprosthetic Aortic Valve is functioning well with mean gradient of 17mmHg and max velocity of 2.8m/sec. Aortic valve area continuity equation is 1.0 cm². Trave aortic regurgitation. Mild perivalvular leak. Calcified AV.    8.Mild (1+) tricuspid regurgitation.    9.Ascending aorta diameter is mildly dilated at 4.3 cms with normal caliber of aortic root.    10.The pulmonary artery  systolic pressure is 35 mmHg., mildly elevated.    11.As compared to echo doppler study 11/2020 - no significant changes besides PAP decreased from 50 mmg to 35 mmHg.    CPOE Orders carried out by: Heath Chamberlain MD and Holli ABEBE  Care Providers: Heath Chamberlain MD, Tata Meeks CMA and Holli ABEBE  Electronically Authenticated by  Heath Chamberlain MD  11/08/2024 05:29:51 PM  Disclaimer: Components of this note were documented using voice recognition system and are subject to errors not corrected at proofreading. Contact the author of this note for any clarifications.

## 2024-11-13 NOTE — H&P
[facility]     Wang Romero Patient Status:  Emergency    1945 MRN [id number]   Location [facility] EMERGENCY DEPARTMENT Attending Sancho Hodges MD   Hosp Day # 0 PCP Colette Steele MD     Chief Complaint: Generalized weakness    Subjective:    History of Present Illness:     Wang Romero is a 78 year old male with PMHx head and neck cancer s/p chemoradiation, dysphagia s/p PEG tube, hypothyroidism, chronic atrial fibrillation, rheumatoid heart disease s/p bio AVR and MVR in , BABS s/p stent in 2024, chronic anemia who presents to the hospital with cough and generalized weakness. He was discharged on 10/31 when admitted with GIB but he declined endoscopy. Hgb stabilized and bleeding resolved on its own. Eliquis has been on hold. He also had PNA and was discharged on ciprofloxacin which he completed . Since discharge, he has been weak. He is coughing and is SOB. No fever or chills. No nausea or vomiting. He has chronic diarrhea, no change in this.  No dysuria. In the ER, he was in atrial fibrillation with HR up to 120s. Lactic acid 2.1. SBP was in the 80s and he received 800 mL IVF instead of sepsis bolus due to concern for fluid overload. CXR with edema vs bilateral PNA. proBNP is 4391, lower than last month. He was placed on 2L oxygen.    History/Other:    Past Medical History:  Past Medical History:    Arrhythmia    Cancer (HCC)    sqaumos cell     Cataract    Exposure to medical diagnostic radiation    Heart disease    Heart valve disease    Valve replaced in     High cholesterol    Hypothyroidism    Prostate enlargement    Rheumatic fever    Squamous cell carcinoma     Past Surgical History:   Past Surgical History:   Procedure Laterality Date    Cabg      Cardiac valve surgery      Other surgical history      sqaumos cell cancer removed    Valve replacement      pig valve      Family History:   No family history on file.   Social History:    reports that he quit smoking about 18 years ago. His smoking use included cigarettes. He has quit using smokeless tobacco. He reports current alcohol use. He reports that he does not use drugs.     Allergies: Allergies[1]    Medications:  Medications Ordered Prior to Encounter[2]    Review of Systems:   A comprehensive review of systems was completed.    Pertinent positives and negatives noted in the HPI.    Objective:   Physical Exam:    BP 94/64 (BP Location: Right arm)   Pulse 103   Temp 98.7 °F (37.1 °C) (Oral)   Resp (!) 27   Ht 5' 9\" (1.753 m)   Wt 134 lb (60.8 kg)   SpO2 90%   BMI 19.79 kg/m²   General: No acute distress, Alert, Cachectic   HEENT: Posterior pharynx with extensive scabbing  Respiratory: Rhonchi bilaterally, no wheezes  Cardiovascular: S1, S2. IRIR  Abdomen: Soft, Non-tender, non-distended, positive bowel sounds  Extremities: No edema    Results:    Labs:      Labs Last 24 Hours:  Recent Labs   Lab 11/13/24  1042   RBC 3.02*   HGB 8.8*   HCT 28.2*   MCV 93.4   MCH 29.1   MCHC 31.2   RDW 15.1   NEPRELIM 7.66   WBC 9.2   .0     Recent Labs   Lab 11/13/24  1042   *   BUN 29*   CREATSERUM 1.05   EGFRCR 72   CA 9.3   ALB 3.7      K 4.5      CO2 32.0   ALKPHO 132*   AST 27   ALT 11   BILT 0.9   TP 7.3     Lab Results   Component Value Date    PT 22.0 (H) 01/08/2013    PT 19.0 (H) 01/07/2013    PT 25.0 (H) 01/06/2013    INR 1.41 (H) 11/13/2024    INR 1.56 (H) 10/28/2024    INR 1.33 (H) 10/27/2024     Recent Labs   Lab 11/13/24  1042   TROPHS 31     Recent Labs   Lab 11/13/24  1042   PBNP 4,391*     No results for input(s): \"PCT\" in the last 168 hours.    Imaging: Imaging data reviewed in Epic.    Assessment & Plan:      #Acute hypoxic respiratory failure due to PNA and possible CHF  -Wean O2 as tolerated  -Encourage IS    #Bilateral PNA  -Check PCT  -Sputum culture, strep/legionella urine antigens  -Follow blood cultures  -Had completed cipro 11/9.  Start empiric abx again with Unasyn. Completed Azithromycin last admission    #Elevated proBNP with possible pulm edema  -Repeat echo  -Consult cardiology    #Chronic atrial fibrillation with intermittent RVR  -DOAC has been on hold since last admission  -Wean diltiazem gtt as tolerated  -Resume home metoprolol  -Telemetry    #HLD  -Statin    #Hypothyroidism  -Levothyroxine     #Hx of RHD s/p bio AVR and MV repair 2008  -Repeat echo    #S/p right TCAR on 10/15/2024  -Brilinta, statin  -Not on ASA due to bleeding per wife - consult Dr. Jones. He has appointment 11/18/24    #H/o head and neck cancer with dysphagia s/p PEG  -Tube feeds    #Scabbing in posterior pharynx  -ENT consult. Unclear if he had irritation related to intubation for TCAR surgery and then he had been picking at it per wife. He stopped picking at it several days ago and so hopefully heals    Plan of care discussed with patient, family, RN.    Ben Ware DO    Supplementary Documentation:     The 21st Century Cures Act makes medical notes like these available to patients in the interest of transparency. Please be advised this is a medical document. Medical documents are intended to carry relevant information, facts as evident, and the clinical opinion of the practitioner. The medical note is intended as peer to peer communication and may appear blunt or direct. It is written in medical language and may contain abbreviations or verbiage that are unfamiliar.                       [1]   Allergies  Allergen Reactions    Compazine [Prochlorperazine] HALLUCINATION   [2]   Current Facility-Administered Medications on File Prior to Encounter   Medication Dose Route Frequency Provider Last Rate Last Admin    [COMPLETED] potassium chloride (Klor-Con M20) tab 40 mEq  40 mEq Oral Once Annabella Lim DO   40 mEq at 10/30/24 1031    [COMPLETED] potassium chloride (Klor-Con) 20 MEQ oral powder 40 mEq  40 mEq Oral Once Heath Chamberlain MD   40 mEq at 10/29/24  1125    [COMPLETED] pantoprazole (Protonix) 40 mg in sodium chloride 0.9% PF 10 mL IV push  40 mg Intravenous Once Nneka Ho MD   40 mg at 10/27/24 0515    [COMPLETED] sodium chloride 0.9 % IV bolus 500 mL  500 mL Intravenous Once Nneka Ho MD   Stopped at 10/27/24 0614    [COMPLETED] cefTRIAXone (Rocephin) 2 g in sodium chloride 0.9% 100 mL IVPB-ADDV  2 g Intravenous Once Nneka Ho MD   Stopped at 10/27/24 0543    [COMPLETED] azithromycin (Zithromax) 500 mg in sodium chloride 0.9% 250mL IVPB premix  500 mg Intravenous Once Nneka Ho MD   Stopped at 10/27/24 0637    [] sterile water for injection (PF) injection             [COMPLETED] acetaminophen (Tylenol) 160 MG/5ML oral liquid 650 mg  650 mg Oral Once Nneka Ho MD   650 mg at 10/27/24 0700    [COMPLETED] sodium chloride 0.9 % IV bolus 1,205 mL  1,205 mL Intravenous Once Nneka Ho MD   Stopped at 10/27/24 0831    [COMPLETED] azithromycin (Zithromax) 500 mg in sodium chloride 0.9% 250mL IVPB premix  500 mg Intravenous Q24H Ruy Young  mL/hr at 10/29/24 0636 500 mg at 10/29/24 0636    [COMPLETED] sodium chloride 0.9% infusion   Intravenous Once Natasha Sewell APRN 10 mL/hr at 10/27/24 1130 New Bag at 10/27/24 1130    [] metoprolol (Lopressor) 5 mg/5mL injection 5 mg  5 mg Intravenous Q6H PRN Liliana Pacheco MD        [COMPLETED] Diatrizoate Meglumine & Sodium (GASTROGRAFIN) 66-10 % oral solution 25 mL  25 mL Per G Tube Once Radha Mandel MD   25 mL at 10/24/24 0038    [COMPLETED] digoxin (Lanoxin) 250 MCG/ML injection 250 mcg  250 mcg Intravenous Once Laura Hargrove APN   250 mcg at 10/16/24 1016    [COMPLETED] iodixanol (VISIPAQUE) 320 MG/ML injection 100 mL  100 mL Injection ONCE PRN Wang Jones MD   60 mL at 10/15/24 1100    [] lactated ringers IV bolus 500 mL  500 mL Intravenous Once PRN Aashish Acharya MD        [] atropine 0.1 MG/ML injection 0.5 mg   0.5 mg Intravenous PRN Aashish Acharya MD        [] naloxone (Narcan) 0.4 MG/ML injection 0.08 mg  0.08 mg Intravenous PRN Aashish Acharya MD        [] fentaNYL (Sublimaze) 50 mcg/mL injection 25 mcg  25 mcg Intravenous Q5 Min PRN Aashish Acharya MD        Or    [] fentaNYL (Sublimaze) 50 mcg/mL injection 50 mcg  50 mcg Intravenous Q5 Min PRN Aashish Acharya MD        [] HYDROmorphone (Dilaudid) 1 MG/ML injection 0.2 mg  0.2 mg Intravenous Q5 Min PRN Aashish Acharya MD        Or    [] HYDROmorphone (Dilaudid) 1 MG/ML injection 0.4 mg  0.4 mg Intravenous Q5 Min PRN Aashish Acharya MD        Or    [] HYDROmorphone (Dilaudid) 1 MG/ML injection 0.6 mg  0.6 mg Intravenous Q5 Min PRN Aashish Acharya MD        [COMPLETED] ceFAZolin (Ancef) 2g in 10mL IV syringe premix  2 g Intravenous Q8H Wang Jones  mL/hr at 10/16/24 0005 2 g at 10/16/24 0005    [COMPLETED] influenza virus trivalent high dose PF (Fluzone HD) 0.5 mL injection (ages >/= 65 years) 0.5 mL  0.5 mL Intramuscular Prior to discharge Derrell Raya MD   0.5 mL at 10/17/24 1214    [COMPLETED] iopamidol 76% (ISOVUE-370) injection for power injector  75 mL Intravenous ONCE PRN Frommelt, Julie A, APRN   75 mL at 24 1118     Current Outpatient Medications on File Prior to Encounter   Medication Sig Dispense Refill    ALPRAZolam 0.5 MG Oral Tab Take 1 tablet (0.5 mg total) by mouth daily. (Patient taking differently: Take 1 tablet (0.5 mg total) by mouth daily as needed.)      pantoprazole 40 MG Oral Tab EC Take 1 tablet (40 mg total) by mouth 2 (two) times daily before meals. 60 tablet 0    metoprolol tartrate 25 MG Oral Tab 0.5 tablets (12.5 mg total) by Per G Tube route TID Beta Blocker/Cardiac. (Patient taking differently: 0.5 tablets (12.5 mg total) by Per G Tube route 2 (two) times daily.) 90 tablet 3    ticagrelor 90 MG Oral Tab Take 1 tablet (90 mg total) by mouth every 12 (twelve) hours.       simvastatin 20 MG Oral Tab Take 1 tablet (20 mg total) by mouth nightly.      Levothyroxine Sodium 50 MCG Oral Tab 1 tablet (50 mcg total) by Per G Tube route before breakfast.  0

## 2024-11-13 NOTE — ED PROVIDER NOTES
Patient Seen in: [facility] Emergency Department      History     Chief Complaint   Patient presents with    Fatigue     Stated Complaint: WEAKNESS    Subjective:   HPI      78-year-old male comes to the hospital complaint of having difficulty with increased fatigue.  Recently he has been treated in the hospital with a urinary tract infection as well as pneumonia.  Is been home for about 9 days and just continues to worsen.  His cough is still present.  His heart rate seems to be fast and his oxygen level seems to be low at home according to the wife.  Did interview the wife to help provide clarification.  Is unclear if he is had fevers but is felt warm at times.  He denies any headaches or chest pain.  Denies abdominal pains.  There is no reported vomiting or diarrhea.  The patient did recently have anemia secondary to bleeding thought to come from his nose while being on anticoagulants.  Patient is still on anticoagulants.  There are no other specific complaints this time.    Objective:     Past Medical History:    Arrhythmia    Cancer (HCC)    sqaumos cell     Cataract    Exposure to medical diagnostic radiation    Heart disease    Heart valve disease    Valve replaced in     High cholesterol    Hypothyroidism    Prostate enlargement    Rheumatic fever    Squamous cell carcinoma              Past Surgical History:   Procedure Laterality Date    Cabg      Cardiac valve surgery      Other surgical history      sqaumos cell cancer removed    Valve replacement  2008    pig valve                Social History     Socioeconomic History    Marital status:    Tobacco Use    Smoking status: Former     Current packs/day: 0.00     Types: Cigarettes     Quit date: 2006     Years since quittin.2    Smokeless tobacco: Former   Vaping Use    Vaping status: Never Used   Substance and Sexual Activity    Alcohol use: Yes     Comment: socially    Drug use: No     Social Drivers of Health     Food  Insecurity: No Food Insecurity (10/27/2024)    Food Insecurity     Food Insecurity: Never true   Transportation Needs: No Transportation Needs (10/27/2024)    Transportation Needs     Lack of Transportation: No   Physical Activity: Sufficiently Active (2/25/2020)    [facility]    Exercise Vital Sign     Days of Exercise per Week: 7 days     Minutes of Exercise per Session: 60 min    [facility]    Social Connections   Housing Stability: Low Risk  (10/27/2024)    Housing Stability     Housing Instability: No                  Physical Exam     ED Triage Vitals [11/13/24 1032]   BP 94/66   Pulse (!) 140   Resp 24   Temp 98 °F (36.7 °C)   Temp src Oral   SpO2 95 %   O2 Device None (Room air)       Current Vitals:   Vital Signs  BP: (!) 83/61  Pulse: 100  Resp: (!) 41  Temp: 98 °F (36.7 °C)  Temp src: Oral  MAP (mmHg): 69    Oxygen Therapy  SpO2: 96 %  O2 Device: Nasal cannula  O2 Flow Rate (L/min): 2 L/min        Physical Exam  HEENT; NCAT, EOMI, throat clear, neck supple, no LAD, no JVD  Heart S1S2 IRRR, tachyarrhythmia  lungs: Cough noted with some rhonchi noted  abd: Soft NT, ND,  NABS without rebound or guarding  Ext no C/C/E    ED Course     Labs Reviewed   LACTIC ACID, PLASMA - Abnormal; Notable for the following components:       Result Value    Lactic Acid 2.1 (*)     All other components within normal limits   CBC WITH DIFFERENTIAL WITH PLATELET - Abnormal; Notable for the following components:    RBC 3.02 (*)     HGB 8.8 (*)     HCT 28.2 (*)     Lymphocyte Absolute 0.38 (*)     All other components within normal limits   COMP METABOLIC PANEL (14) - Abnormal; Notable for the following components:    Glucose 100 (*)     BUN 29 (*)     Alkaline Phosphatase 132 (*)     Globulin  3.6 (*)     All other components within normal limits   PRO BETA NATRIURETIC PEPTIDE - Abnormal; Notable for the following  components:    Pro-Beta Natriuretic Peptide 4,391 (*)     All other components within normal limits   PROTHROMBIN TIME (PT) - Abnormal; Notable for the following components:    PT 17.4 (*)     INR 1.41 (*)     All other components within normal limits   PTT, ACTIVATED - Normal   TROPONIN I HIGH SENSITIVITY - Normal   SARS-COV-2/FLU A AND B/RSV BY PCR (GENEXPERT) - Normal    Narrative:     This test is intended for the qualitative detection and differentiation of SARS-CoV-2, influenza A, influenza B, and respiratory syncytial virus (RSV) viral RNA in nasopharyngeal or nares swabs from individuals suspected of respiratory viral infection consistent with COVID-19 by their healthcare provider. Signs and symptoms of respiratory viral infection due to SARS-CoV-2, influenza, and RSV can be similar.    Test performed using the Xpert Xpress SARS-CoV-2/FLU/RSV (real time RT-PCR)  assay on the [employer] instrument, [employer], [place] [address].   This test is being used under the Food and Drug Administration's Emergency Use Authorization.    The authorized Fact Sheet for Healthcare Providers for this assay is available upon request from the laboratory.   URINALYSIS WITH CULTURE REFLEX   LACTIC ACID REFLEX POST POSTIVE   TYPE AND SCREEN    Narrative:     The following orders were created for panel order Type and screen.  Procedure                               Abnormality         Status                     ---------                               -----------         ------                     ABORH (Blood Type)[[id number]]                               Final result               Antibody Screen[[id number]]                                  Final result                 Please view results for these tests on the individual orders.   ABORH (BLOOD TYPE)   ANTIBODY SCREEN   RAINBOW DRAW LAVENDER   RAINBOW DRAW LIGHT GREEN   RAINBOW DRAW BLUE   BLOOD CULTURE   BLOOD CULTURE     EKG    Rate, intervals and axes as noted on EKG  Report.  Rate: 125  Rhythm: Atrial Fibrillation  Reading: QRS of 82, patient is A-fib with rapid ventricular response at this time.           ED Course as of 11/13/24 1249  ------------------------------------------------------------  Time: 11/13 1247  Comment: While the patient was typed and screened.  His BNP was elevated to 4391.  The patient's white count was 9.2.  His hemoglobin was 8.8.  His COVID influenza and RSV were negative.  He has had blood cultures x 2 taken.  He had a lactic acid of 2.1.  Electrolytes were unremarkable.  He had a chest x-ray that appears interpreted showing CHF with possible superimposed pneumonia.  He was given Rocephin and azithromycin and I spoke with the hospitalist.  Patient was started on Cardizem as well with improvement for his A-fib.       XR CHEST AP PORTABLE  (CPT=71045)    Result Date: 11/13/2024  PROCEDURE:  XR CHEST AP PORTABLE  (CPT=71045)  TECHNIQUE:  AP chest radiograph was obtained.  COMPARISON:  [facility] , XR, XR CHEST AP PORTABLE  (CPT=71045), 10/27/2024, 3:51 AM.  INDICATIONS:  WEAKNESS  PATIENT STATED HISTORY: (As transcribed by Technologist)  Patient had carotid surgery 3 weeks ago, and had pnuemonia 2 weeks ago.    FINDINGS:  Normal heart size with sternotomy change.  Increased right perihilar and right upper lobe alveolar opacities.  Decreased left perihilar mid lung opacities with increased alveolar opacity in left upper lung.  Pulmonary vessels are obscured            CONCLUSION:  Bilateral alveolar pneumonia versus edema is increased on the right, improved in left mid lung and increased in the left upper lung.   LOCATION:  [facility]      Dictated by (CST): [facility] Young MD on 11/13/2024 at 11:47 AM     Finalized by (CST): [facility] Young MD on 11/13/2024 at 11:49 AM       XR CHEST AP PORTABLE  (CPT=71045)    Result Date: 10/27/2024  PROCEDURE:  XR CHEST AP PORTABLE  (CPT=71045)  TECHNIQUE:  AP chest radiograph was obtained.  COMPARISON:  [facility] , XR, XR ABDOMEN (1  VIEW) (CPT=74018), 10/24/2024, 0:48 AM.  INDICATIONS:  GABRIELLA  PATIENT STATED HISTORY: (As transcribed by Technologist)  Patient states shortness of breath with wet productive cough x1 week. Hx carotid surgery 10 days ago; Throat Cancer; Open heart surgery               CONCLUSION:  Postsurgical changes of median sternotomy.  Cardiac silhouette upper limits of normal.  Patchy airspace disease in the left upper lobe.  This is concerning for pneumonia.  Prominent interstitial markings throughout both lungs may represent edema.  Blunting both costophrenic angles.    LOCATION:  [facility]      Dictated by (CST): Liz Deleon MD on 10/27/2024 at 8:12 AM     Finalized by (CST): Liz Deleon MD on 10/27/2024 at 8:14 AM       XR ABDOMEN (1 VIEW) (CPT=74018)    Result Date: 10/24/2024  PROCEDURE:  XR ABDOMEN (1 VIEW) (CPT=74018)  INDICATIONS:  PULLED OUT G TUBE, contrast injection  COMPARISON:  None.  TECHNIQUE:  Supine AP view was obtained.  PATIENT STATED HISTORY: (As transcribed by Technologist)  Patient offered no additional history at this time.              CONCLUSION:    2 images obtained after 30 cc contrast injected by way of the G-tube.  The G-tube tip is in the distal stomach.  No sign of chronic tracts except position.  Some contrast passes through the stomach into the duodenum.  LOCATION:  [facility]   Dictated by (CST): Cirilo Sher MD on 10/24/2024 at 8:14 AM     Finalized by (CST): Cirilo Sher MD on 10/24/2024 at 8:15 AM      Medications   sodium chloride 0.9 % IV bolus 500 mL (0 mL Intravenous Stopped 11/13/24 1159)     Followed by   sodium chloride 0.9% infusion (has no administration in time range)   dilTIAZem 5 mg BOLUS FROM BAG infusion (5 mg Intravenous Bolus from Bag 11/13/24 1054)     And   dilTIAZem (cardIZEM) 100 mg in sodium chloride 0.9% 100 mL IVPB-ADDV (2.5 mg/hr Intravenous Rate/Dose Change 11/13/24 1247)   sodium chloride 0.9 % IV bolus 300 mL (300 mL Intravenous New Bag 11/13/24 1210)    cefTRIAXone (Rocephin) 1 g in sodium chloride 0.9% 100 mL IVPB-ADDV (1 g Intravenous New Bag 11/13/24 1241)       Patient did not receive 30ml/kg of fluids despite having: elevated Lactate. The reason for doing so is: a concern for fluid overload. 800mL of fluids were given instead      MDM      Differential diagnosis included pneumonia, UTI, sepsis but not limited these.  Patient does have signs of pneumonia as well as CHF and rapid atrial fibrillation.  Patient was started on Cardizem was given IV antibiotics.  The patient is having been to the hospital for further care.    Critical Care Note:  The patient arrived with a condition with significant morbidity and mortality associated. The services I provided  were to promote improvement and reduce mortality specifically involving complex record review, complex medical decisions and interventions, and consultations outside the regular procedures and care normally rendered for 45 minutes of critical care time      This note was prepared using Dragon Medical voice recognition dictation software.  As a result errors may occur.  When identified to these areas have been corrected.  While every attempt is made to correct errors during dictation discrepancies may still exist.  Please contact if there are any errors.        Medical Decision Making      Disposition and Plan     Clinical Impression:  1. Atrial fibrillation with rapid ventricular response (HCC)    2. Acute on chronic congestive heart failure, unspecified heart failure type (HCC)    3. Community acquired pneumonia, unspecified laterality    4. Sepsis due to pneumonia (HCC)         Disposition:  There is no disposition on file for this visit.  There is no disposition time on file for this visit.    Follow-up:  No follow-up provider specified.        Medications Prescribed:  Current Discharge Medication List              Supplementary Documentation:

## 2024-11-14 PROBLEM — J98.01 BRONCHOSPASM: Status: ACTIVE | Noted: 2024-01-01

## 2024-11-14 PROBLEM — R91.8 PULMONARY INFILTRATES: Status: ACTIVE | Noted: 2024-01-01

## 2024-11-14 NOTE — CONSULTS
[facility]    PATIENT'S NAME: DA BISHOP   ATTENDING PHYSICIAN: Ben Ware DO   CONSULTING PHYSICIAN: Da Jones M.D.   PATIENT ACCOUNT#:   [id number]    LOCATION:  [address] [place] [address]  MEDICAL RECORD #:   [id number]       YOB: 1945  ADMISSION DATE:       11/13/2024      CONSULT DATE:  11/13/2024    REPORT OF CONSULTATION    HISTORY OF PRESENT ILLNESS:  This is an 78-year-old white male who was admitted to the hospital with shortness of breath, possible fevers and possible diagnosis of pneumonia.  He had a high-grade carotid stenosis that was treated with a TCAR procedure by myself and Dr. Brooks on 10/15/2024, cutdown of the right common carotid artery with placement of a balloon angioplasty 5 x 40 and a [place] stent 9 x 7 x 40 mm overlapping a 9 x 30 mm stent.  It was deployed by Dr. Brooks for high-grade recurrent stenosis with 90% to 95% blockage.  The patient has a history of head and neck cancer and had radiation in the past for treatment of his cancer.  The patient had throat cancer that was treated back in 2010 with surgery and radiation.  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  He is still active, but he lost significant amount of weight at least 120 pounds, and he also has thyroid disease also result from radiation therapy.  He has been followed by Dr. Colette Steele and Dr. Terry Chamberlain.     PAST MEDICAL HISTORY:  Open heart surgery; valve replacement by Dr. Miller, a mitral valve; and a feeding tube because he cannot swallow.      MEDICATIONS:  He is on levothyroxine, had been on apixaban 5 mg b.i.d., simvastatin, prednisolone.  The apixaban was stopped due to bleeding.  He had been treated with Plavix as well as aspirin.  Suggest to restart apixaban in the future.      ALLERGIES:  He has no known allergies.      SOCIAL HISTORY:  He has a long history of tobacco, but quit 2008.  He used to be an .  He is .  No  children.      FAMILY HISTORY:  Mother  from Alzheimer disease.  Father  from myocardial infarction.      ASSESSMENT AND PLAN:  He knows that he is supposed to be on Brilinta and baby aspirin to keep the stent open, but reportedly he has been off this after the surgery.  He was seen in the emergency room when he came in here with increasing fatigue, fevers, past history of urinary tract infection as well as pneumonia.  He was discharged home after being in the hospital about 9, 10 days ago.  Still with upper respiratory cough.  Not clear if he had any fevers.  He was seen by Dr. Ben Ware, and he is following him medically.  He has a diagnosis of atrial fibrillation, hypoxic failure, possibly from pneumonia versus heart failure.    At this time, would just recommend try to keep the Brilinta and baby aspirin to keep the stents open.  Followup medical management for pneumonia, possibly urinary tract infection and fatigue per primary doctor.  Would consult Cardiology who already been on the case for possible heart failure and atrial fibrillation as well as the valve replacement.     Dictated By Wang Jones M.D.  d: 2024 17:24:48  t: 2024 18:50:16  Job [id number]  JJW/    cc: Wang Jones M.D.

## 2024-11-14 NOTE — PROGRESS NOTES
[facility]   part of [facility]     Hospitalist Progress Note     Wang Romero Patient Status:  Inpatient    1945 MRN [id number]   Location [facility] 8NE-A Attending Ben Ware,    Hosp Day # 1 PCP Colette Steele MD     Chief Complaint: Generalized weakness    Subjective:     Patient remains on supplemental oxygen. Rate controlled and now off diltiazem gtt. Feels about the same, reports increased secretions but having difficulty expectorating due to weak cough.    Objective:    Review of Systems:   A comprehensive review of systems was completed; pertinent positive and negatives stated in subjective.    Vital signs:  Temp:  [97.6 °F (36.4 °C)-98.9 °F (37.2 °C)] 98.4 °F (36.9 °C)  Pulse:  [] 90  Resp:  [24-41] 26  BP: ()/(46-75) 99/56  SpO2:  [87 %-100 %] 98 %    Physical Exam:    General: No acute distress, Alert, Cachectic   HEENT: Posterior pharynx with extensive scabbing/crusting  Respiratory: Rhonchi bilaterally, no wheezes  Cardiovascular: S1, S2. IRIR  Abdomen: Soft, Non-tender, non-distended, positive bowel sounds  Extremities: No edema    Diagnostic Data:    Labs:  Recent Labs   Lab 24  1042 24  0523   WBC 9.2 6.1   HGB 8.8* 7.2*   MCV 93.4 94.8   .0 279.0   INR 1.41*  --      Recent Labs   Lab 24  1042 24  0524   * 117*   BUN 29* 29*   CREATSERUM 1.05 0.90   CA 9.3 8.6*   ALB 3.7  --     141   K 4.5 4.4    106   CO2 32.0 35.0*   ALKPHO 132*  --    AST 27  --    ALT 11  --    BILT 0.9  --    TP 7.3  --      Estimated Creatinine Clearance: 56.3 mL/min (based on SCr of 0.9 mg/dL).    Recent Labs   Lab 24  1042   TROPHS 31     Recent Labs   Lab 24  1042   PTP 17.4*   INR 1.41*      Microbiology  No results found for this visit on 24.    Imaging: Reviewed in Epic.    Medications:    levothyroxine  50 mcg Per G Tube Before breakfast    metoprolol tartrate  12.5 mg Per G Tube BID    atorvastatin  10  mg Per G Tube Nightly    ticagrelor  90 mg Per G Tube Q12H    ampicillin-sulbactam  3 g Intravenous Q6H    enoxaparin  40 mg Subcutaneous Nightly    aspirin  81 mg Per G Tube Daily    pantoprazole  40 mg Intravenous BID AC    sodium chloride  1 spray Each Nare TID       Assessment & Plan:      #Acute hypoxic respiratory failure due to PNA  -proBNP elevated but clinically does not appear to be volume overloaded  -Abx for PNA  -Wean O2 as tolerated  -Encourage IS     #Bilateral PNA  -PCT 0.23  -Sputum culture, strep/legionella urine antigens  -Follow blood cultures  -Had completed cipro 11/9. Started on empiric abx again with Unasyn. 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 Jones following     #H/o head and neck cancer with dysphagia s/p PEG  -Tube feeds     #Scabbing/crusting in posterior pharynx  #Xerostomia  -ENT eval appreciated. Endoscopy without airway obstruction or evidence of cancer recurrence. There was thick mucus in pharynx. Saline nasal spray    -Encouraged him to not pick at the site  -F/u with his ENT Dr. Bush ([place]) on discharge    #Deconditioning  -PT/OT    Ben Ware DO    Supplementary Documentation:     Quality:  DVT Mechanical Prophylaxis:   SCDs,    DVT Pharmacologic Prophylaxis   Medication    enoxaparin (Lovenox) 40 MG/0.4ML SUBQ injection 40 mg      DVT Pharmacologic prophylaxis: Aspirin 162 mg       Code Status: DNAR/Selective Treatment  Gaitan: No urinary catheter in place  PRISCILA: TBD    Discharge is dependent on: Clinical state,  consultant recs  At this point Mr. Romero is expected to be discharge to: Home    The 21st Century Cures Act makes medical notes like these available to patients in the interest of transparency. Please be advised this is a medical document. Medical documents are intended to carry relevant information, facts as evident, and the clinical opinion of the practitioner. The medical note is intended as peer to peer communication and may appear blunt or direct. It is written in medical language and may contain abbreviations or verbiage that are unfamiliar.              **Certification    PHYSICIAN Certification of Need for Inpatient Hospitalization - Initial Certification  Patient will require inpatient services that will reasonably be expected to span two midnight's based on the clinical documentation in H+P.   Based on patients current state of illness, I anticipate that, after discharge, patient will require TBD.

## 2024-11-14 NOTE — CONSULTS
[facility]    PATIENT'S NAME: DA BISHOP   ATTENDING PHYSICIAN: Ben Ware DO   CONSULTING PHYSICIAN: Da Jones M.D.   PATIENT ACCOUNT#:   [id number]    LOCATION:  [address] [place] [address]  MEDICAL RECORD #:   [id number]       YOB: 1945  ADMISSION DATE:       11/13/2024      CONSULT DATE:  11/14/2024    REPORT OF CONSULTATION    FOLLOWUP CONSULTATION    The patient is seen today.  Neurologically he is intact.  Wounds are clean and dry.  He is rebreathing face mask and has upper respiratory pneumonia.  He just noticed that the top of his oropharynx has blood across the eschar.  Notes were read from Dr. Natalie Graff that maybe he was irritating this with his finger, keeps on touching it, and he is also on dual antiplatelet medications with Brilinta.  The patient currently is not actively bleeding.  His hemoglobin is stable.  Notes were read from ENT.  I tried to get hold of Dr. Graff, but the phone number I was given is disconnected so I will try to talk to her in the future if I can reach her.  I asked the nurses covering for him to give her my phone number if she ever comes by.  The patient at this time needs to be conservative treatment.  I would like to try and keep the dual antiplatelet medication, but would readjust if necessary.  He also has had anticoagulation for atrial fibrillation and currently not on that.  He is being followed by Cardiology for this.  The only recommendation that was firm was to follow up with ENT Dr. Bush at [facility] and discharge.  No other offerings were made.     Dictated By Da Jones M.D.  d: 11/14/2024 13:09:36  t: 11/14/2024 13:47:44  Job [id number]  JJW/    cc: Da Jones M.D.

## 2024-11-14 NOTE — PLAN OF CARE
Patient laying in bed, denies chest pain, shortness of breath and dizziness. Patient alert and oriented on nasal canula. Hard to understand, with audible secretions and hoarse low voice. Afib on cardiac monitor. Echo being done now. NPO, meds and feedings through Peg tube. Peg tube intact and secure, flushing with no problems. Plan for bolus feeds, IV abx, oral care and medications to facilitate softening scabs in mouth. Plan reviewed with Wife over the phone. Call light with in reach, fall precautions in place, all questions answered.     Problem: RESPIRATORY - ADULT  Goal: Achieves optimal ventilation and oxygenation  Description: INTERVENTIONS:  - Assess for changes in respiratory status  - Assess for changes in mentation and behavior  - Position to facilitate oxygenation and minimize respiratory effort  - Oxygen supplementation based on oxygen saturation or ABGs  - Provide Smoking Cessation handout, if applicable  - Encourage broncho-pulmonary hygiene including cough, deep breathe, Incentive Spirometry  - Assess the need for suctioning and perform as needed  - Assess and instruct to report SOB or any respiratory difficulty  - Respiratory Therapy support as indicated  - Manage/alleviate anxiety  - Monitor for signs/symptoms of CO2 retention  Outcome: Progressing

## 2024-11-14 NOTE — CONSULTS
Cardiology Consultation      Wang Romero Patient Status:  Inpatient    1945 MRN [id number]   Formerly [facility] 8NE-A Attending Ben Ware, DO   Hosp Day # 0 PCP Colette Steele MD     History of Present Illness:  Wang Romero is a(n) 78-year-old male with chronic medical conditions including chronic atrial fibrillation, bioprosthetic aortic valve, CAD, history of mitral valve repair, Hx head and neck cancer with history of dysphagia s/p PEG tube placement, carotid artery stenosis s/p stent placement x 2.  Patient presents with worsening upper respiratory shortness of breath and secretions.  Patient is unable to vocalize his concerns well enough.  He is alert and does follow commands but is unable to speak clearly.  According documentation, he was recently hospitalized for pneumonia and urinary tract infection.  According to his family, his oxygen levels appear to be lower at home.  He is noted to have atrial fibrillation with rapid ventricular response and started on Cardizem infusion.  He was also noted to to have elevated procalcitonin.  CXR was suggestive of pneumonia.  He was started on antibiotics.  He was noted to have worsening respiratory secretions as well as more difficulty breathing and therefore cardiology was consulted given his underlying history.    History:  Past Medical History:    Arrhythmia    Cancer (HCC)    sqaumos cell     Cataract    Exposure to medical diagnostic radiation    Heart disease    Heart valve disease    Valve replaced in     High cholesterol    Hypothyroidism    Prostate enlargement    Rheumatic fever    Squamous cell carcinoma     Past Surgical History:   Procedure Laterality Date    Cabg      Cardiac valve surgery      Other surgical history      sqaumos cell cancer removed    Valve replacement      pig valve     History reviewed. No pertinent family history.   reports that he quit smoking about 18 years ago. His smoking use included  cigarettes. He has quit using smokeless tobacco. He reports current alcohol use. He reports that he does not use drugs.    Allergies:  Allergies[1]    Medications:    Current Facility-Administered Medications:     [COMPLETED] dilTIAZem 5 mg BOLUS FROM BAG infusion, 5 mg, Intravenous, Once **AND** dilTIAZem (cardIZEM) 100 mg in sodium chloride 0.9% 100 mL IVPB-ADDV, 2.5-20 mg/hr, Intravenous, Continuous    ALPRAZolam (Xanax) tab 0.5 mg, 0.5 mg, Per G Tube, Daily PRN    [START ON 11/14/2024] levothyroxine (Synthroid) tab 50 mcg, 50 mcg, Per G Tube, Before breakfast    pantoprazole (Protonix) DR tab 40 mg, 40 mg, Oral, BID AC    metoprolol tartrate (Lopressor) partial tab 12.5 mg, 12.5 mg, Per G Tube, BID    atorvastatin (Lipitor) tab 10 mg, 10 mg, Per G Tube, Nightly    ticagrelor (Brilinta) tab 90 mg, 90 mg, Per G Tube, Q12H    ampicillin-sulbactam (Unasyn) 3 g in sodium chloride 0.9% 100mL IVPB-ADD, 3 g, Intravenous, Q6H    enoxaparin (Lovenox) 40 MG/0.4ML SUBQ injection 40 mg, 40 mg, Subcutaneous, Nightly    melatonin tab 3 mg, 3 mg, Oral, Nightly PRN    polyethylene glycol (PEG 3350) (Miralax) 17 g oral packet 17 g, 17 g, Oral, Daily PRN    sennosides (Senokot) tab 17.2 mg, 17.2 mg, Oral, Nightly PRN    bisacodyl (Dulcolax) 10 MG rectal suppository 10 mg, 10 mg, Rectal, Daily PRN    fleet enema (Fleet) rectal enema 133 mL, 1 enema, Rectal, Once PRN    ondansetron (Zofran) 4 MG/2ML injection 4 mg, 4 mg, Intravenous, Q6H PRN    metoclopramide (Reglan) 5 mg/mL injection 5 mg, 5 mg, Intravenous, Q8H PRN    benzonatate (Tessalon) cap 200 mg, 200 mg, Oral, TID PRN    guaiFENesin (Robitussin) 100 MG/5 ML oral liquid 200 mg, 200 mg, Oral, Q4H PRN    glycerin-hypromellose- (Artificial Tears) 0.2-0.2-1 % ophthalmic solution 1 drop, 1 drop, Both Eyes, QID PRN    sodium chloride (Saline Mist) 0.65 % nasal solution 1 spray, 1 spray, Each Nare, Q3H PRN    acetaminophen (Tylenol Extra Strength) tab 500 mg, 500 mg, Per G  Tube, Q4H PRN    aspirin chewable tab 81 mg, 81 mg, Per G Tube, Daily    Review of Systems:  A comprehensive review of systems was negative if not otherwise mention in above HPI.    /75 (BP Location: Right arm)   Pulse (!) 127   Temp 98.9 °F (37.2 °C) (Oral)   Resp (!) 29   Ht 5' 9\" (1.753 m)   Wt 134 lb (60.8 kg)   SpO2 (!) 88%   BMI 19.79 kg/m²   Temp (24hrs), Av.5 °F (36.9 °C), Min:98 °F (36.7 °C), Max:98.9 °F (37.2 °C)       Intake/Output Summary (Last 24 hours) at 2024 1806  Last data filed at 2024 1427  Gross per 24 hour   Intake 800 ml   Output 200 ml   Net 600 ml     Wt Readings from Last 3 Encounters:   24 134 lb (60.8 kg)   10/30/24 134 lb 0.6 oz (60.8 kg)   10/23/24 130 lb (59 kg)       Physical Exam:   General: Alert.  Cachectic appearance.  HEENT: Normocephalic, anicteric sclera, neck supple.  Neck: No JVD  Cardiac: irregularly irregular.  Lungs: Rattling upon auscultation and feeling bilaterally and throughout   Abdomen: Soft, non-tender. BS-present.  Extremities: Without clubbing, cyanosis or edema.   Neurologic: Alert  Skin: Warm and dry.     Laboratory Data:  Lab Results   Component Value Date    WBC 9.2 2024    HGB 8.8 2024    HCT 28.2 2024    .0 2024    CREATSERUM 1.05 2024    BUN 29 2024     2024    K 4.5 2024     2024    CO2 32.0 2024     2024    CA 9.3 2024    ALB 3.7 2024    ALKPHO 132 2024    BILT 0.9 2024    TP 7.3 2024    AST 27 2024    ALT 11 2024    PTT 30.1 2024    INR 1.41 2024    PTP 17.4 2024       Imaging/results:  CXR-notable for suspected bilateral pneumonia  Lactate 0.9  Troponin 31  proBNP 4391      Assessment:  Acute hypoxic respiratory failure-suspect 2/2 upper respiratory secretions  Posterior oropharynx mucosal disruption  A-fib with RVR  Hx RHD,s/p bio AVR and MV repair 2008  Carotid artery  stenosis s/p stent placement x 2 10/15/2024  Hx of head and neck cancer s/p radiation with G tube in place  Chronic anemia  Previously declined endoscopy  Chronically ill and cachectic      Plan:  Use Cardizem infusion in the meantime to control ventricular rates.  ENT eval this evening.  Suspect the patient will need evaluation of upper airway along with suctioning of secretions.  If respiratory issues do not improve from above, recommend CT of the chest without contrast for further investigation.  Clinically, patient does not appear to have signs of significant volume overload as there is no peripheral edema, no significant JVD.  Procalcitonin elevated.  Recommend antibiotics as initiated by primary.  Echo pending.  Further recommendations to follow        Thank you for allowing me to participate in the care of your patient.      Yury Ware DO  Cardiologist  [facility]  11/13/2024 6:06 PM      Note to the patient: The 21st Century Cures Act makes medical notes like these available to patients in the interest of transparency. However, be advised that this is a medical document. It is intended as peer to peer communication. It is written in medical language and may contain abbreviations or verbiage that are unfamiliar. It may appear blunt or direct. Medical documents are intended to carry relevant information, facts as evident, and clinical opinion of the practitioner.     Disclaimer: Components of this note were documented using voice recognition system and are subject to errors not corrected at proofreading. Contact the author of this note for any clarifications.          [1]   Allergies  Allergen Reactions    Compazine [Prochlorperazine] HALLUCINATION

## 2024-11-14 NOTE — PHYSICAL THERAPY NOTE
PT orders received and chart reviewed. Pt occupied with echo. Will follow and re-attempt as able and appropriate.     Attempted in the afternoon and pt politely declining to participate in PT evaluation at this time. Would like PT to return tomorrow morning. Will follow and re-attempt as able and appropriate.

## 2024-11-14 NOTE — DIETARY MALNUTRITION NOTE
[facility]   part of [facility]  NUTRITION ASSESSMENT    Pt meets severe malnutrition criteria at this time.    CRITERIA FOR MALNUTRITION DIAGNOSIS:  Criteria for severe malnutrition diagnosis: chronic illness related to body fat severe depletion and muscle mass severe depletion    NUTRITION INTERVENTION:    RD nutrition Care Plan- See RD nutrition assessment for additional recommendations  Enteral Nutrition - Via PEG, recommend bolus TF: Boost Very High Calorie - 3 cartons/day (1 carton @ 0900, 1 carton @ 1300, 1 carton @ 1800 or per pt preference).   This will provide 1590 kcal, 66 grams protein, 480 ml total free water.   Recommend 200 ml water flush before and after each bolus, TF+FWF provides 1680 ml total fluids.   Coordination of Nutrition Care - Recommend SLP consult prior to diet advancement. and Palliative care consult for goal of care    PATIENT STATUS: 78 year old male admitted on 11/13 presents with cough and generalized weakness  Scabbing/crusting in posterior pharynx . Pt screened d/t consult for TF recs. Visited pt at bedside , no family. Pt is only willing to receive 3 cartons Boost VHC daily during admit. Pt denies any GI symptoms recently with TF. Wt stable    PMH: Head and neck Cancer s/p PEG, Cataract, High cholesterol, Hypothyroidism, Prostate enlargement, Rheumatic fever, and Squamous cell carcinoma.    ANTHROPOMETRICS:  Ht: 177.8 cm (5' 10\")  Wt: 60.8 kg (134 lb).   BMI: Body mass index is 19.79 kg/m².  IBW: 75.5 kg    WEIGHT HISTORY:   Patient Weight(s) for the past 336 hrs:   Weight   11/14/24 0139 60.8 kg (134 lb)   11/13/24 1032 60.8 kg (134 lb)       Wt Readings from Last 10 Encounters:   11/14/24 60.8 kg (134 lb)   10/30/24 60.8 kg (134 lb 0.6 oz)   10/23/24 59 kg (130 lb)   10/22/24 59 kg (130 lb)   10/17/24 61 kg (134 lb 7.7 oz)   04/06/22 64.4 kg (142 lb)   03/23/22 65.8 kg (145 lb)   12/06/20 61.8 kg (136 lb 3.9 oz)   11/13/19 61.3 kg (135 lb 2.3 oz)   11/12/19 65.8 kg  (145 lb)        NUTRITION:  Diet:       Procedures    NPO      Food Allergies: No  Cultural/Ethnic/Voodoo Preferences Addressed: Yes    Percent Meals Eaten (last 3 days)       Date/Time Percent Meals Eaten (%)    11/14/24 0746 0 %     Percent Meals Eaten (%): NPO at 11/14/24 0746            GI SYSTEM REVIEW:  +PEG  Skin/Wounds: WNL    NUTRITION RELATED PHYSICAL FINDINGS:     1. Body Fat/Muscle Mass: severe depletion body fat Buccal fat pad and Triceps and severe muscle depletion Temple region, Clavicle region, Thigh region, and Calf region     2. Fluid Accumulation: none per RN documentation    NUTRITION PRESCRIPTION:  61 kg Actual Body Weight  Calories: 33732-3632gogvuztg/day (25-30 kcal/kg)  Protein: 73-91 grams protein/day (1.2-1.5 gm/kg)  Fluid: ~1 ml/kcal or per MD discretion    NUTRITION DIAGNOSIS/PROBLEM:  Malnutrition related to physiological causes as evidenced by loss of fat mass and loss of muscle mass    MONITOR AND EVALUATE/NUTRITION GOALS:  Weight stable within 1 to 2 lbs during admission - New  Tolerate alternative nutrition at 100% of goal - New      MEDICATIONS:  Abx, synthroid, protonix    LABS:  Reviewed     Pt is at High nutrition risk    Margie Lara RD, LDN  Clinical Nutrition  [id number]

## 2024-11-14 NOTE — PAYOR COMM NOTE
--------------  ADMISSION REVIEW     Payor: [employer] MED[employer]ARE ADV PPO  Subscriber #:  [id number]  Authorization Number: [id number]    Admit date: 11/13/24  Admit time:  2:12 PM       REVIEW DOCUMENTATION:     ED Provider Notes            Stated Complaint: WEAKNESS    Subjective:   HPI      78-year-old male comes to the hospital complaint of having difficulty with increased fatigue.  Recently he has been treated in the hospital with a urinary tract infection as well as pneumonia.  Is been home for about 9 days and just continues to worsen.  His cough is still present.  His heart rate seems to be fast and his oxygen level seems to be low at home according to the wife.  Did interview the wife to help provide clarification.  Is unclear if he is had fevers but is felt warm at times.  He denies any headaches or chest pain.  Denies abdominal pains.  There is no reported vomiting or diarrhea.  The patient did recently have anemia secondary to bleeding thought to come from his nose while being on anticoagulants.  Patient is still on anticoagulants.  There are no other specific complaints this time.        Physical Exam     ED Triage Vitals [11/13/24 1032]   BP 94/66   Pulse (!) 140   Resp 24   Temp 98 °F (36.7 °C)   Temp src Oral   SpO2 95 %   O2 Device None (Room air)       Current Vitals:   Vital Signs  BP: (!) 83/61  Pulse: 100  Resp: (!) 41  Temp: 98 °F (36.7 °C)  Temp src: Oral  MAP (mmHg): 69    Oxygen Therapy  SpO2: 96 %  O2 Device: Nasal cannula  O2 Flow Rate (L/min): 2 L/min        Physical Exam  HEENT; NCAT, EOMI, throat clear, neck supple, no LAD, no JVD  Heart S1S2 IRRR, tachyarrhythmia  lungs: Cough noted with some rhonchi noted  abd: Soft NT, ND,  NABS without rebound or guarding  Ext no C/C/E    ED Course     Labs Reviewed   LACTIC ACID, PLASMA - Abnormal; Notable for the following components:       Result Value    Lactic Acid 2.1 (*)     All other components within normal limits   CBC WITH DIFFERENTIAL WITH  PLATELET - Abnormal; Notable for the following components:    RBC 3.02 (*)     HGB 8.8 (*)     HCT 28.2 (*)     Lymphocyte Absolute 0.38 (*)     All other components within normal limits   COMP METABOLIC PANEL (14) - Abnormal; Notable for the following components:    Glucose 100 (*)     BUN 29 (*)     Alkaline Phosphatase 132 (*)     Globulin  3.6 (*)     All other components within normal limits   PRO BETA NATRIURETIC PEPTIDE - Abnormal; Notable for the following components:    Pro-Beta Natriuretic Peptide 4,391 (*)     All other components within normal limits   PROTHROMBIN TIME (PT) - Abnormal; Notable for the following components:    PT 17.4 (*)     INR 1.41 (*)     All other components within normal limits   PTT, ACTIVATED - Normal   TROPONIN I HIGH SENSITIVITY - Normal   EKG    Rate, intervals and axes as noted on EKG Report.  Rate: 125  Rhythm: Atrial Fibrillation  Reading: QRS of 82, patient is A-fib with rapid ventricular response at this time.    ED Course as of 11/13/24 1249  ------------------------------------------------------------  Time: 11/13 1247  Comment: While the patient was typed and screened.  His BNP was elevated to 4391.  The patient's white count was 9.2.  His hemoglobin was 8.8.  His COVID influenza and RSV were negative.  He has had blood cultures x 2 taken.  He had a lactic acid of 2.1.  Electrolytes were unremarkable.  He had a chest x-ray that appears interpreted showing CHF with possible superimposed pneumonia.  He was given Rocephin and azithromycin and I spoke with the hospitalist.  Patient was started on Cardizem as well with improvement for his A-fib.       XR CHEST AP PORTABLE  (CPT=71045)    Result Date: 11/13/2024  PROCEDURE:  XR CHEST AP PORTABLE  (CPT=71045)  TECHNIQUE:  AP chest radiograph was obtained.  COMPARISON:  [facility] , XR, XR CHEST AP PORTABLE  (CPT=71045), 10/27/2024, 3:51 AM.  INDICATIONS:  WEAKNESS  PATIENT STATED HISTORY: (As transcribed by Technologist)  Patient  had carotid surgery 3 weeks ago, and had pnuemonia 2 weeks ago.    FINDINGS:  Normal heart size with sternotomy change.  Increased right perihilar and right upper lobe alveolar opacities.  Decreased left perihilar mid lung opacities with increased alveolar opacity in left upper lung.  Pulmonary vessels are obscured            CONCLUSION:  Bilateral alveolar pneumonia versus edema is increased on the right, improved in left mid lung and increased in the left upper lung.   LOCATION:  [facility]      Dictated by (CST): [facility] Young MD on 11/13/2024 at 11:47 AM     Finalized by (CST): [facility] Young MD on 11/13/2024 at 11:49 AM       XR CHEST AP PORTABLE  (CPT=71045)    Result Date: 10/27/2024  PROCEDURE:  XR CHEST AP PORTABLE  (CPT=71045)  TECHNIQUE:  AP chest radiograph was obtained.  COMPARISON:  [facility] , XR, XR ABDOMEN (1 VIEW) (CPT=74018), 10/24/2024, 0:48 AM.  INDICATIONS:  GABRIELLA  PATIENT STATED HISTORY: (As transcribed by Technologist)  Patient states shortness of breath with wet productive cough x1 week. Hx carotid surgery 10 days ago; Throat Cancer; Open heart surgery               CONCLUSION:  Postsurgical changes of median sternotomy.  Cardiac silhouette upper limits of normal.  Patchy airspace disease in the left upper lobe.  This is concerning for pneumonia.  Prominent interstitial markings throughout both lungs may represent edema.  Blunting both costophrenic angles.    LOCATION:  [facility]      Dictated by (CST): Liz Deleon MD on 10/27/2024 at 8:12 AM     Finalized by (CST): Liz Deleon MD on 10/27/2024 at 8:14 AM       XR ABDOMEN (1 VIEW) (CPT=74018)    Result Date: 10/24/2024  PROCEDURE:  XR ABDOMEN (1 VIEW) (CPT=74018)  INDICATIONS:  PULLED OUT G TUBE, contrast injection  COMPARISON:  None.  TECHNIQUE:  Supine AP view was obtained.  PATIENT STATED HISTORY: (As transcribed by Technologist)  Patient offered no additional history at this time.              CONCLUSION:    2 images obtained after 30 cc  contrast injected by way of the G-tube.  The G-tube tip is in the distal stomach.  No sign of chronic tracts except position.  Some contrast passes through the stomach into the duodenum.  LOCATION:  [facility]   Dictated by (CST): Cirilo Sher MD on 10/24/2024 at 8:14 AM     Finalized by (CST): Cirilo Sher MD on 10/24/2024 at 8:15 AM          Patient did not receive 30ml/kg of fluids despite having: elevated Lactate. The reason for doing so is: a concern for fluid overload. 800mL of fluids were given instead     MDM      Differential diagnosis included pneumonia, UTI, sepsis but not limited these.  Patient does have signs of pneumonia as well as CHF and rapid atrial fibrillation.  Patient was started on Cardizem was given IV antibiotics.  The patient is having been to the hospital for further care.    Critical Care Note:  The patient arrived with a condition with significant morbidity and mortality associated. The services I provided  were to promote improvement and reduce mortality specifically involving complex record review, complex medical decisions and interventions, and consultations outside the regular procedures and care normally rendered for 45 minutes of critical care time        Disposition and Plan     Clinical Impression:  1. Atrial fibrillation with rapid ventricular response (HCC)    2. Acute on chronic congestive heart failure, unspecified heart failure type (HCC)    3. Community acquired pneumonia, unspecified laterality    4. 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 He was discharged on 10/31 when admitted with GIB but he declined endoscopy. Hgb stabilized and bleeding resolved on its own. Eliquis has been on hold. He also had PNA and was discharged on ciprofloxacin which he completed 11/9. Since discharge, he has been weak. He is coughing and is SOB. No fever or chills. No nausea or vomiting. He has chronic diarrhea, no change in this.  No dysuria. In the ER, he was in atrial fibrillation with HR up to 120s. Lactic acid 2.1. SBP was in the 80s and he received 800 mL IVF instead of sepsis bolus due to concern for fluid overload. CXR with edema vs bilateral PNA. proBNP is 4391, lower than last month. He was placed on 2L oxygen.      Assessment & Plan:  #Acute hypoxic respiratory failure due to PNA and possible CHF  -Wean O2 as tolerated  -Encourage IS     #Bilateral PNA  -Check PCT  -Sputum culture, strep/legionella urine antigens  -Follow blood cultures  -Had completed cipro 11/9. Start empiric abx again with Unasyn. Completed Azithromycin last admission     #Elevated proBNP with possible pulm edema  -Repeat echo  -Consult cardiology     #Chronic atrial fibrillation with intermittent RVR  -DOAC has been on hold since last admission  -Wean diltiazem gtt as tolerated  -Resume home metoprolol  -Telemetry     #HLD  -Statin     #Hypothyroidism  -Levothyroxine     #Hx of RHD s/p bio AVR and MV repair 2008  -Repeat echo     #S/p right TCAR on 10/15/2024  -Brilinta, statin  -Not on ASA due to bleeding per wife - consult Dr. Jones. He has appointment 11/18/24     #H/o head and neck cancer with dysphagia s/p PEG  -Tube feeds     #Scabbing in posterior pharynx  -ENT consult. Unclear if he had irritation related to intubation for TCAR surgery and then he had been picking at it  per wife. He stopped picking at it several days ago and so hopefully heals     Plan of care discussed with patient, family, RN.     Ben Ware DO           Cardiology Consultation              Wang Romero Patient Status:  Inpatient    1945 MRN [id number]   Former[facility] 8NE-A Attending Ben Ware DO   Hosp Day # 0 PCP Colette Steele MD      History of Present Illness:  Wang Romero is a(n) 78-year-old male with chronic medical conditions including chronic atrial fibrillation, bioprosthetic aortic valve, CAD, history of mitral valve repair, Hx head and neck cancer with history of dysphagia s/p PEG tube placement, carotid artery stenosis s/p stent placement x 2.  Patient presents with worsening upper respiratory shortness of breath and secretions.  Patient is unable to vocalize his concerns well enough.  He is alert and does follow commands but is unable to speak clearly.  According documentation, he was recently hospitalized for pneumonia and urinary tract infection.  According to his family, his oxygen levels appear to be lower at home.  He is noted to have atrial fibrillation with rapid ventricular response and started on Cardizem infusion.  He was also noted to to have elevated procalcitonin.  CXR was suggestive of pneumonia.  He was started on antibiotics.  He was noted to have worsening respiratory secretions as well as more difficulty breathing and therefore cardiology was consulted given his underlying history.              Imaging/results:  CXR-notable for suspected bilateral pneumonia  Lactate 0.9  Troponin 31  proBNP 4391        Assessment:  Acute hypoxic respiratory failure-suspect 2/2 upper respiratory secretions  Posterior oropharynx mucosal disruption  A-fib with RVR  Hx RHD,s/p bio AVR and MV repair   Carotid artery stenosis s/p stent placement x 2 10/15/2024  Hx of head and neck cancer s/p radiation with G tube in place  Chronic anemia  Previously  declined endoscopy  Chronically ill and cachectic        Plan:  Use Cardizem infusion in the meantime to control ventricular rates.  ENT eval this evening.  Suspect the patient will need evaluation of upper airway along with suctioning of secretions.  If respiratory issues do not improve from above, recommend CT of the chest without contrast for further investigation.  Clinically, patient does not appear to have signs of significant volume overload as there is no peripheral edema, no significant JVD.  Procalcitonin elevated.  Recommend antibiotics as initiated by primary.  Echo pending.  Further recommendations to follow           Thank you for allowing me to participate in the care of your patient.        Yury Ware DO      ENT:  11/13   Reason for Consultation:  Oropharyngeal scabbing, xerostomia     History of Present Illness:  Wang Romero is a a(n) 78 year old male with h/o T2N2b tonsil scca s/p chemoradiation 2010 and post treatment neck dissection 2011. He is followed by Dr. Bush at [facility]. He has a G-tube due to severe aspiration. Was last checked by Dr. Bush earlier this year with no e/o disease. Wang is not admitted for pneumonia and ENT is consulted for crusting/scabbing in his oropharynx. History obtained from Wang, as well as his wife by phone. She reports that he has had this crusting in his mouth for the last 5 days. He has been trying to pick at it but then he gets some bleeding. He does not take any nutrition or water by mouth. He is on NC O2 since admission. His mouth is always dry, but this crusting is new.      Flexible Laryngoscopy Procedure Note     Due to inability for adequate examination of the larynx and need for magnification to perform the examination, endoscopy was performed.  Risks and benefits were discussed with patient/family and they have given verbal consent to proceed.     Pre-operative Diagnosis:   Post-nasal drip     Post-operative Diagnosis: Same     Procedure:  Diagnostic flexible fiberoptic laryngoscopy     Anesthesia: None     Surgeon: No name on file     EBL: 0cc     Procedure Details:  A flexible fiberoptic rhinolaryngoscope was passed into the right nasal cavity.  It was advanced through the nasopharyx and oropharynx. The hypopharynx, supraglottis and glottis were then examined. The mobility of the true vocal cords was assessed. The scope was then withdrawn.     Findings: All the structures listed above in the procedure description appeared normal, except as follows:   Mucosa very dry   Oropharynx globs of thick yellow mucus scattered throughout  Larynx thick mucus scattered in the supraglottis , TVC mobile bilaterally with mild bowing. Cough weak with limited clearance of mucus. Airway widely patent.     Condition: Stable     Complications: Patient tolerated the procedure well with no immediate complications.    Complex medical history, admitted for pneumonia and on ticagrelor with h/o T2N2b tonsil scca s/p chemoradiation 2010, post-treatment neck dissection 2011 with extensive palate/pharyngeal crusting that is likely multifactorial from severe xerostomia from XRT, NPO status, and weak cough. Exam and endoscopy with no airway obstruction, no e/o cancer recurrence (but limited due to crusting) but thick mucus in his pharynx/larynx. Recommend adding oral/nasal moisture to soften palate crusting with high humidity face mask, oral swabs, and saline nasal spray. Discussed importance of not picking at crusting because it may cause bleeding and infection, but once it is softer it can be removed. Plan also discussed with his wife. Recommend follow-up with Dr. Bush ([place]) after discharge. Call if questions/concerns or if condition worsens.      Thank you for allowing me to participate in the care of your patient.     Natalie Graff MD      11/14 HOSPITALIST:    Chief Complaint: Generalized weakness        Subjective:  Patient remains on supplemental oxygen. Rate  controlled and now off diltiazem gtt. Feels about the same, reports increased secretions but having difficulty expectorating due to weak cough.      Assessment & Plan:  #Acute hypoxic respiratory failure due to PNA  -proBNP elevated but clinically does not appear to be volume overloaded  -Abx for PNA  -Wean O2 as tolerated  -Encourage IS     #Bilateral PNA  -PCT 0.23  -Sputum culture, strep/legionella urine antigens  -Follow blood cultures  -Had completed cipro 11/9. Started on empiric abx again with Unasyn. 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 Jones following     #H/o head and neck cancer with dysphagia s/p PEG  -Tube feeds     #Scabbing/crusting in posterior pharynx  #Xerostomia  -ENT eval appreciated. Endoscopy without airway obstruction or evidence of cancer recurrence. There was thick mucus in pharynx. Saline nasal spray    -Encouraged him to not pick at the site  -F/u with his ENT Dr. Bush ([place]) on discharge     #Deconditioning  -PT/OT     Ben Ware, DO            MEDICATIONS ADMINISTERED IN LAST 1 DAY:  ampicillin-sulbactam (Unasyn) 3 g in sodium chloride 0.9% 100mL IVPB-ADD       Date Action Dose Route User    11/14/2024 0607 New Bag 3 g Intravenous Geovanna Dean RN    11/14/2024 0045 New Bag 3 g Intravenous Geovanna Dean RN    11/13/2024 1810 New Bag 3 g Intravenous Mia Funes, YUDELKA          aspirin chewable tab 81 mg       Date Action Dose Route User    11/14/2024 0920 Given 81 mg Per G Tube Mia Funes, YUDELKA    11/13/2024 1810  Given 81 mg Per G Tube Mia Funes RN          atorvastatin (Lipitor) tab 10 mg       Date Action Dose Route User    11/13/2024 2019 Given 10 mg Per G Tube Geovanna Dean RN          dilTIAZem (cardIZEM) 100 mg in sodium chloride 0.9% 100 mL IVPB-ADDV       Date Action Dose Route User    11/13/2024 1247 Rate/Dose Change 2.5 mg/hr Intravenous Michell Saenz RN    11/13/2024 1208 Rate/Dose Change 5 mg/hr Intravenous Harish Gonzalez RN    11/13/2024 1053 New Bag 10 mg/hr Intravenous Harish Gonzalez RN          dilTIAZem 5 mg BOLUS FROM BAG infusion       Date Action Dose Route User    11/13/2024 1054 Bolus from Bag 5 mg Intravenous Harish Gonzalez RN          enoxaparin (Lovenox) 40 MG/0.4ML SUBQ injection 40 mg       Date Action Dose Route User    11/13/2024 2019 Given 40 mg Subcutaneous (Left Lower Abdomen) Geovanna Dean RN          levothyroxine (Synthroid) tab 50 mcg       Date Action Dose Route User    11/14/2024 0607 Given 50 mcg Per G Tube Geovanna Dean RN          melatonin tab 3 mg       Date Action Dose Route User    11/13/2024 2215 Given 3 mg Oral Geovanna Dean RN          metoprolol tartrate (Lopressor) partial tab 12.5 mg       Date Action Dose Route User    11/13/2024 2100 Given 12.5 mg Per G Tube Geovanna Dean RN          pantoprazole (Protonix) 40 mg in sodium chloride 0.9% PF 10 mL IV push       Date Action Dose Route User    11/14/2024 0607 Given 40 mg Intravenous Geovanna Dean RN    11/13/2024 2018 Given 40 mg Intravenous Geovanna Dean RN          sodium chloride (Saline Mist) 0.65 % nasal solution 1 spray       Date Action Dose Route User    11/14/2024 0926 Given 1 spray Each Nare Mia Funes RN    11/13/2024 2215 Given 1 spray Each Nare Geovanna Dean RN          sodium chloride 0.9 % IV bolus 500 mL       Date Action Dose Route User    11/13/2024 1054 New Bag 500 mL Intravenous Worst, Harish, RN          sodium chloride 0.9 % IV bolus 300 mL       Date Action Dose Route  User    11/13/2024 1210 New Bag 300 mL Intravenous Harish Gonzalez RN          ticagrelor (Brilinta) tab 90 mg       Date Action Dose Route User    11/14/2024 0920 Given 90 mg Per G Tube Mia Funes, YUDELKA    11/13/2024 2019 Given 90 mg Per G Tube Geovanna Dean, RN            Vitals (last day)       Date/Time Temp Pulse Resp BP SpO2 Weight O2 Device O2 Flow Rate (L/min) [facility]    11/14/24 0746 97.7 °F (36.5 °C) 109 24 97/70 98 % -- Nasal cannula 3 L/min AL    11/14/24 0430 98.4 °F (36.9 °C) -- 26 -- -- -- -- -- KS    11/14/24 0420 -- 90 -- -- 98 % -- Nasal cannula -- KS    11/14/24 0139 -- -- -- -- -- 134 lb (60.8 kg) -- -- NS    11/14/24 0023 -- 87 -- -- 96 % -- Nasal cannula 3 L/min     11/13/24 2334 -- 95 -- 99/56 93 % -- Simple mask 5 L/min NS    11/13/24 2332 97.8 °F (36.6 °C) 100 24 93/56 93 % -- Simple mask 5 L/min NS    11/13/24 2100 -- 90 -- 95/68 94 % -- -- -- ARLYN    11/13/24 2045 -- 104 -- 91/65 98 % -- Nasal cannula 1.5 L/min NS    11/13/24 2025 -- 92 -- 87/58 93 % -- Nasal cannula 1.5 L/min NS    11/13/24 2022 -- 100 -- 94/64 95 % -- Nasal cannula -- NS    11/13/24 2021 -- 103 -- 88/55 95 % -- Nasal cannula -- NS    11/13/24 1910 97.6 °F (36.4 °C) 90 26 102/64 100 % -- Nasal cannula 2 L/min KS    11/13/24 1745 -- 127 -- -- 88 % -- -- -- NC    11/13/24 1638 -- 113 -- 103/75 97 % -- -- -- NC    11/13/24 1620 98.9 °F (37.2 °C) 107 29 81/52 95 % -- Nasal cannula 2 L/min NC    11/13/24 1429 98.7 °F (37.1 °C) 103 27 94/64 90 % -- Nasal cannula 2 L/min NC    11/13/24 1400 -- 88 29 87/62 95 % -- Nasal cannula 2 L/min MK    11/13/24 1330 -- 100 37 89/58 95 % -- Nasal cannula 2 L/min MK    11/13/24 1315 -- 91 38 94/61 95 % -- -- -- MW    11/13/24 1300 -- 99 37 92/59 97 % -- Nasal cannula 2 L/min AT    11/13/24 1255 -- 116 39 89/63 95 % -- -- -- AT    11/13/24 1245 -- 100 41 83/61 96 % -- Nasal cannula 2 L/min AT    11/13/24 1215 -- 87 36 92/46 95 % -- Nasal cannula 2 L/min AT    11/13/24 1200 -- 93 31 81/56 96 %  -- Nasal cannula 2 L/min     11/13/24 1159 -- 101 33 84/62 99 % -- Nasal cannula 2 L/min     11/13/24 1145 -- 100 32 93/51 98 % -- Nasal cannula 2 L/min     11/13/24 1130 -- 94 38 84/61 98 % -- Nasal cannula --     11/13/24 1115 -- 113 36 80/60 99 % -- Nasal cannula 2 L/min     11/13/24 1100 -- 110 37 89/64 96 % -- Nasal cannula 2 L/min     11/13/24 1045 -- 114 28 94/66 87 % -- Nasal cannula 2 L/min     11/13/24 1032 98 °F (36.7 °C) 140 24 94/66 95 % 134 lb (60.8 kg) None (Room air) -- MW

## 2024-11-14 NOTE — PROGRESS NOTES
Patient with increased audible rhonci. Oxygen saturations maintained WNL with nasal canula. Attempted high humidify mask, patient does not like it, he states \"I'm sweating, take it off its not working.\"

## 2024-11-14 NOTE — PROGRESS NOTES
Called Dr. Jones office and left Dr. Graff phone number with his  Nikolai, she will give corrected phone number to Dr. Jones.

## 2024-11-14 NOTE — CONSULTS
[facility]  Report of Consultation    Wang Romero Patient Status:  Inpatient    1945 MRN [id number]   Location [facility] 8NE-A Attending Ben Ware, DO   Hosp Day # 1 PCP Colette Steele MD     Reason for Consultation:      History of Present Illness:  Wang Romero is a a(n) 78 year old male.  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  Wife reports that prior to his TCAR he was walking 2 miles a day with no respiratory limitation no chronic coughing and no prior history of asthma.  Wife states he was weak following his discharge from TCAR admitted 10/15 to 10/17.  He was readmitted 10/22 with hematuria received Keflex for 7 days.  Presented with shortness of breath, fever cough and weakness 10/27 with hypotension anemia found to have Serratia UTI and Serratia pneumonia and received 9 days of Cipro.  Anemia with evidence of GI bleed presumed as well. increasing fatigue/profound weakness and oral crusting prompted emergency room visit again .  A-fib with RVR was noted as well as mild hypotension.  He was begun on Unasyn IV and diltiazem.  Currently he is requiring 2 L of O2 but with evidence of respiratory distress.  He has audible upper airway sounds with great difficulty expectorating.  He has remained afebrile.-No vomiting or diarrhea though he has not been tolerating his feedings.    History:  Past Medical History:    Arrhythmia    Cancer (HCC)    sqaumos cell     Cataract    Exposure to medical diagnostic radiation    Heart disease    Heart valve  disease    Valve replaced in 2008    High cholesterol    Hypothyroidism    Prostate enlargement    Rheumatic fever    Squamous cell carcinoma     History reviewed. No pertinent family history.   reports that he quit smoking about 18 years ago. His smoking use included cigarettes. He has quit using smokeless tobacco. He reports current alcohol use. He reports that he does not use drugs.    Allergies:  Allergies[1]    Medications:  Prescriptions Prior to Admission[2]    Review of Systems:    Constitutional: Wife believes his weight has been stable denies any issues has noted significant crusting and eschar  Eyes:   Ears, nose, mouth, throat, and face:has noted significant crusting and eschar Upper oral-and new since surgery  Respiratory: As above  Cardiovascular: Unaware of palpitations diffuse weakness as above no nausea vomiting  Gastrointestinal: There is been no diarrhea denies any significant reflux type symptoms  Musculoskeletal: Trace to +1 edema bilaterally generalized weakness denies any focal issues  Neurological: No diagnosis of MANNY     All other review of systems are negative.    Vital signs in last 24 hours:  Patient Vitals for the past 24 hrs:   BP Temp Temp src Pulse Resp SpO2 Weight   11/14/24 1158 101/69 97.7 °F (36.5 °C) Oral (!) 121 26 97 % --   11/14/24 0746 97/70 97.7 °F (36.5 °C) Oral 109 24 98 % --   11/14/24 0430 -- 98.4 °F (36.9 °C) Oral -- 26 -- --   11/14/24 0420 -- -- -- 90 -- 98 % --   11/14/24 0139 -- -- -- -- -- -- 134 lb (60.8 kg)   11/14/24 0023 -- -- -- 87 -- 96 % --   11/13/24 2334 99/56 -- -- 95 -- 93 % --   11/13/24 2332 93/56 97.8 °F (36.6 °C) Oral 100 24 93 % --   11/13/24 2100 95/68 -- -- 90 -- 94 % --   11/13/24 2045 91/65 -- -- 104 -- 98 % --   11/13/24 2025 (!) 87/58 -- -- 92 -- 93 % --   11/13/24 2022 94/64 -- -- 100 -- 95 % --   11/13/24 2021 (!) 88/55 -- -- 103 -- 95 % --   11/13/24 1910 102/64 97.6 °F (36.4 °C) Oral 90 26 100 % --   11/13/24 1745 -- -- -- (!) 127 -- (!)  88 % --   11/13/24 1638 103/75 -- -- 113 -- 97 % --   11/13/24 1620 (!) 81/52 98.9 °F (37.2 °C) Oral 107 (!) 29 95 % --   11/13/24 1429 94/64 98.7 °F (37.1 °C) Oral 103 (!) 27 90 % --         Physical Exam:   General: alert, cooperative, oriented.  Dysarthric  Upper airway congestion with audible wheeze-dyspnea with minimal movement   Head: Normocephalic, without obvious abnormality, atraumatic.   Eyes/Nose: No obvious lesion   Throat: Lips are dry small airway noted very large eschar/crusting upper buccal surface extending to soft palate no other evidence of thrush   lungs: Expiratory wheeze bilaterally central rhonchi noted that is marked bilateral rales    Chest wall: No tenderness or deformity.   Heart: Irregularly irregular rate 120   Abdomen: soft, non-distended, no masses, no guarding, no     Rebound.  Flat seems nontender G-tube in place   Extremity: +1 edema bilaterally   Skin: No rashes or lesions.bruising    Neurological: Alert, interactive, no focal deficits    Lab Data Review:  Lab Results   Component Value Date    WBC 6.1 11/14/2024    HGB 7.2 11/14/2024    HCT 23.7 11/14/2024    .0 11/14/2024    CREATSERUM 0.90 11/14/2024    BUN 29 11/14/2024     11/14/2024    K 4.4 11/14/2024     11/14/2024    CO2 35.0 11/14/2024     11/14/2024    CA 8.6 11/14/2024    PGLU 175 11/14/2024       Cultures:   Blood cultures 10/27 and 11/13 remain negative/pending  Urine 10/22 with Serratia  Sputum 10/28 with Serratia  Negative Legionella,negative strep    Radiology:    Chest x-ray with bilateral upper lobe mid field alveolar infiltrates-new from 2021-right infiltrate is less dense compared to 10/27-left side seems increased   No evidence of evolving effusions    Assessment and Plan:  Patient Active Problem List   Diagnosis    Dupuytren's contracture of both hands    Dupuytren's contracture of left hand    Community acquired pneumonia of left lung    Community acquired pneumonia of left lung,  unspecified part of lung    Hemoptysis    Anticoagulated    Chronic atrial fibrillation (HCC)    Pure hypercholesterolemia    Acquired hypothyroidism    Syncope, near    Gastroenteritis    Elevated troponin    Community acquired pneumonia of left lower lobe of lung    Diarrhea    Vomiting    History of aortic valve replacement with bioprosthetic valve    Lytic bone lesions on xray    Elevated PSA    Subarachnoid hemorrhage (HCC)    Atrial fibrillation and flutter (HCC)    Subarachnoid hemorrhage following injury, no loss of consciousness (HCC)    Closed fracture of orbit (HCC)    Fall    Urinary tract infection without hematuria    Hypoxemia    Acute respiratory failure with hypoxia (HCC)    Community acquired pneumonia, unspecified laterality    Aspiration pneumonia (HCC)    Malnutrition (HCC)    Septic shock (HCC)    Gram-negative pneumonia (HCC)    Dysphagia    Stenosis of right carotid artery    Anemia due to acute blood loss    Carotid artery disease (HCC)    Gastrointestinal hemorrhage    Melena    Status post carotid endarterectomy    Hyponatremia    GI bleed    Atrial fibrillation with rapid ventricular response ([facility])    Acute on chronic congestive heart failure, unspecified heart failure type (HCC)    Sepsis due to pneumonia (HCC)       Assessment:  Hypoxic respiratory failure  Bilateral pulmonary infiltrates most compatible with pneumonia-sputum to be obtained as able  Bronchospasm/great difficulty clearing secretions  Oral eschar/crusting-ENT input appreciated  Peripheral vascular disease thought radiation related status post TCAR 10/15/2022   history of rheumatic heart disease status post AVR and MV repair in 2008  A-fib with RVR  History of head neck cancer status post RT and surgical resection 2011  Chronic dysphagia with longstanding G-tube  Ongoing anemia    Plan:  Reviewed with nursing and ENT plan to obtain sputum via careful suctioning as able  Adjust antibiotics empirically add low-dose  bronchodilators  Increase bronchopulmonary toilet as able-May need CoughAssist  Further recommendations pending clinical course  Reviewed at length with patient and his wife at bedside    Brianna Chester MD  11/14/2024  2:16 PM         [1]   Allergies  Allergen Reactions    Compazine [Prochlorperazine] HALLUCINATION   [2]   Medications Prior to Admission   Medication Sig Dispense Refill Last Dose/Taking    ALPRAZolam 0.5 MG Oral Tab Take 1 tablet (0.5 mg total) by mouth daily. (Patient taking differently: Take 1 tablet (0.5 mg total) by mouth daily as needed.)   Past Month    pantoprazole 40 MG Oral Tab EC Take 1 tablet (40 mg total) by mouth 2 (two) times daily before meals. 60 tablet 0 11/13/2024 at  7:00 AM    metoprolol tartrate 25 MG Oral Tab 0.5 tablets (12.5 mg total) by Per G Tube route TID Beta Blocker/Cardiac. (Patient taking differently: 0.5 tablets (12.5 mg total) by Per G Tube route 2 (two) times daily.) 90 tablet 3 11/13/2024 at  7:00 AM    ticagrelor 90 MG Oral Tab Take 1 tablet (90 mg total) by mouth every 12 (twelve) hours.   11/13/2024 at  8:30 AM    simvastatin 20 MG Oral Tab Take 1 tablet (20 mg total) by mouth nightly.   11/12/2024 at  6:00 PM    Levothyroxine Sodium 50 MCG Oral Tab 1 tablet (50 mcg total) by Per G Tube route before breakfast.  0 11/13/2024 at  7:00 AM

## 2024-11-14 NOTE — PROGRESS NOTES
Patient admitted from ER. Patient laying in bed, denies chest pain, shortness of breath and dizziness. Patient awake alert and oriented, unclear speech noted. Layer of possible dry blood on roof of mouth and back of throat, Dr. Graff consulted, she will see patient this evening after clinic. Dr. Ware cardiologist consulted. Afib on cardiac monitor, rates controlled, Diltiazem gtt infusing. Peg tube intact and flushing. Call light with in reach, fall precautions in place, all questions answered.     Skin check completed with PCT Veronica. Mepilex applied to sacrum

## 2024-11-14 NOTE — PROGRESS NOTES
Cardiology Progress Note    Wang Romero Patient Status:  Inpatient    1945 MRN [id number]   [facility] 8NE-A Attending Ben Ware, DO   Hosp Day # 1 PCP Colette Steele MD       Subjective:     Continues to have a lot of secretions and little improvement is noted.     Objective:   Temp: 97.7 °F (36.5 °C)  Pulse: 121  Resp: 26  BP: 101/69    Intake/Output:     Intake/Output Summary (Last 24 hours) at 2024 1348  Last data filed at 2024 1158  Gross per 24 hour   Intake 0 ml   Output 1000 ml   Net -1000 ml       Last 3 Weights   24 0139 134 lb (60.8 kg)   24 1032 134 lb (60.8 kg)   10/30/24 0500 134 lb 0.6 oz (60.8 kg)   10/29/24 0711 134 lb (60.8 kg)   10/28/24 0600 139 lb 15.9 oz (63.5 kg)   10/27/24 0909 142 lb 6.7 oz (64.6 kg)   10/27/24 0301 125 lb (56.7 kg)   10/23/24 2232 130 lb (59 kg)         Physical Exam:     General: Alert. Cachectic appearance.  HEENT: Normocephalic, anicteric sclera, neck supple.  Cardiac: Irregularly irregular, tachy.   Lungs: Chest rattling bilaterally throughout.   Abdomen: Soft, non-tender.   Extremities: Without clubbing, cyanosis or edema.    Neurologic: Alert.  Skin: Warm and dry.     Laboratory/Data:    Labs:         Recent Labs   Lab 24  1042 24  05   WBC 9.2 6.1   HGB 8.8* 7.2*   MCV 93.4 94.8   .0 279.0   INR 1.41*  --        Recent Labs   Lab 24  1042 24    141   K 4.5 4.4    106   CO2 32.0 35.0*   BUN 29* 29*   CREATSERUM 1.05 0.90   CA 9.3 8.6*   * 117*       Recent Labs   Lab 24  1042   ALT 11   AST 27   ALB 3.7       No results for input(s): \"TROP\" in the last 168 hours.    Allergies:   Allergies[1]    Medications:  Current Facility-Administered Medications   Medication Dose Route Frequency    sodium chloride 3 % nebulizer solution 3 mL  3 mL Nebulization TID    dilTIAZem (cardIZEM) 100 mg in sodium chloride 0.9% 100 mL IVPB-ADDV  2.5-20 mg/hr Intravenous  Continuous    ALPRAZolam (Xanax) tab 0.5 mg  0.5 mg Per G Tube Daily PRN    levothyroxine (Synthroid) tab 50 mcg  50 mcg Per G Tube Before breakfast    metoprolol tartrate (Lopressor) partial tab 12.5 mg  12.5 mg Per G Tube BID    atorvastatin (Lipitor) tab 10 mg  10 mg Per G Tube Nightly    ticagrelor (Brilinta) tab 90 mg  90 mg Per G Tube Q12H    ampicillin-sulbactam (Unasyn) 3 g in sodium chloride 0.9% 100mL IVPB-ADD  3 g Intravenous Q6H    enoxaparin (Lovenox) 40 MG/0.4ML SUBQ injection 40 mg  40 mg Subcutaneous Nightly    melatonin tab 3 mg  3 mg Oral Nightly PRN    polyethylene glycol (PEG 3350) (Miralax) 17 g oral packet 17 g  17 g Oral Daily PRN    sennosides (Senokot) tab 17.2 mg  17.2 mg Oral Nightly PRN    bisacodyl (Dulcolax) 10 MG rectal suppository 10 mg  10 mg Rectal Daily PRN    fleet enema (Fleet) rectal enema 133 mL  1 enema Rectal Once PRN    ondansetron (Zofran) 4 MG/2ML injection 4 mg  4 mg Intravenous Q6H PRN    metoclopramide (Reglan) 5 mg/mL injection 5 mg  5 mg Intravenous Q8H PRN    benzonatate (Tessalon) cap 200 mg  200 mg Oral TID PRN    guaiFENesin (Robitussin) 100 MG/5 ML oral liquid 200 mg  200 mg Oral Q4H PRN    glycerin-hypromellose- (Artificial Tears) 0.2-0.2-1 % ophthalmic solution 1 drop  1 drop Both Eyes QID PRN    sodium chloride (Saline Mist) 0.65 % nasal solution 1 spray  1 spray Each Nare Q3H PRN    acetaminophen (Tylenol Extra Strength) tab 500 mg  500 mg Per G Tube Q4H PRN    aspirin chewable tab 81 mg  81 mg Per G Tube Daily    pantoprazole (Protonix) 40 mg in sodium chloride 0.9% PF 10 mL IV push  40 mg Intravenous BID AC    sodium chloride (Saline Mist) 0.65 % nasal solution 1 spray  1 spray Each Nare TID         Assessment:  Acute hypoxic respiratory failure-suspect 2/2 upper respiratory secretions  Posterior oropharynx mucosal disruption  A-fib with RVR  Hx RHD,s/p bio AVR and MV repair 2008  Carotid artery stenosis s/p stent placement x 2 10/15/2024  Hx of head  and neck cancer s/p radiation with G tube in place  Chronic anemia  Previously declined endoscopy  Chronically ill and cachectic      Plan:  Off cardizem. Cont po BB.  ENT eval'd pt - rec'd avoiding suctioning due to risk of bleeding. Inhaled supportive therapy in the meantime. Discussed with hospitalist.  Clinically, patient does not appear to have signs of significant volume overload as there is no peripheral edema, no significant JVD.  ECHO notable for LVEF 60-65%. Elevated bioprosthetic valve gradient likely due to hyperdynamic state.        Yury Ware DO  Cardiologist  [facility]  11/14/2024 1:48 PM        Note to the patient: The 21st Century Cures Act makes medical notes like these available to patients in the interest of transparency. However, be advised that this is a medical document. It is intended as peer to peer communication. It is written in medical language and may contain abbreviations or verbiage that are unfamiliar. It may appear blunt or direct. Medical documents are intended to carry relevant information, facts as evident, and clinical opinion of the practitioner.     Disclaimer: Components of this note were documented using voice recognition system and are subject to errors not corrected at proofreading. Contact the author of this note for any clarifications.          [1]   Allergies  Allergen Reactions    Compazine [Prochlorperazine] HALLUCINATION

## 2024-11-14 NOTE — CM/SW NOTE
Received order for POLST form. Form on chart for completion with MD. Izzy GARNETT, LSW  Discharge Planner

## 2024-11-14 NOTE — CONSULTS
[facility]  Report of Otolaryngology Consultation    Wang Romero Patient Status:  Inpatient    1945 MRN [id number]   Location [facility] 8NE-A Attending Ben Ware, DO   Hosp Day # 0 PCP Colette Steele MD     Reason for Consultation:  Oropharyngeal scabbing, xerostomia    History of Present Illness:  Wang Romero is a a(n) 78 year old male with h/o T2N2b tonsil scca s/p chemoradiation  and post treatment neck dissection . He is followed by Dr. Bush at [facility]. He has a G-tube due to severe aspiration. Was last checked by Dr. Bush earlier this year with no e/o disease. Wang is not admitted for pneumonia and ENT is consulted for crusting/scabbing in his oropharynx. History obtained from Wang, as well as his wife by phone. She reports that he has had this crusting in his mouth for the last 5 days. He has been trying to pick at it but then he gets some bleeding. He does not take any nutrition or water by mouth. He is on NC O2 since admission. His mouth is always dry, but this crusting is new.    History:  Past Medical History:    Arrhythmia    Cancer (HCC)    sqaumos cell     Cataract    Exposure to medical diagnostic radiation    Heart disease    Heart valve disease    Valve replaced in     High cholesterol    Hypothyroidism    Prostate enlargement    Rheumatic fever    Squamous cell carcinoma     Past Surgical History:   Procedure Laterality Date    Cabg      Cardiac valve surgery      Other surgical history      sqaumos cell cancer removed    Valve replacement      pig valve       Family History:  History reviewed. No pertinent family history.    Social History:  Social History     Socioeconomic History    Marital status:      Spouse name: Not on file    Number of children: Not on file    Years of education: Not on file    Highest education level: Not on file   Occupational History    Not on file   Tobacco Use    Smoking status: Former     Current  packs/day: 0.00     Types: Cigarettes     Quit date: 2006     Years since quittin.2    Smokeless tobacco: Former   Vaping Use    Vaping status: Never Used   Substance and Sexual Activity    Alcohol use: Yes     Comment: socially    Drug use: No    Sexual activity: Not on file   Other Topics Concern    Not on file   Social History Narrative    Not on file     Social Drivers of Health     Financial Resource Strain: Not on file   Food Insecurity: No Food Insecurity (2024)    Food Insecurity     Food Insecurity: Never true   Transportation Needs: No Transportation Needs (2024)    Transportation Needs     Lack of Transportation: No     Car Seat: Not on file   Physical Activity: Sufficiently Active (2020)    [facility] [employer] [employer]    Exercise Vital Sign     Days of Exercise per Week: 7 days     Minutes of Exercise per Session: 60 min   Stress: Not on file   Social Connections: Unknown (3/8/2021)    [facility]    Social Connections     Conversations with friends/family/neighbors per week: Not on file   Housing Stability: Low Risk  (2024)    Housing Stability     Housing Instability: No     Housing Instability Emergency: Not on file     Crib or Bassinette: Not on file        Allergies:  Allergies[1]    Medications:    Current Facility-Administered Medications:     [COMPLETED] dilTIAZem 5 mg BOLUS FROM BAG infusion, 5 mg, Intravenous, Once **AND** dilTIAZem (cardIZEM) 100 mg in sodium chloride 0.9% 100 mL IVPB-ADDV, 2.5-20 mg/hr, Intravenous, Continuous    ALPRAZolam (Xanax) tab 0.5 mg, 0.5 mg, Per G Tube, Daily PRN    [START ON 2024] levothyroxine (Synthroid) tab 50 mcg, 50 mcg, Per G Tube, Before breakfast    metoprolol tartrate (Lopressor) partial tab 12.5 mg, 12.5 mg, Per G Tube, BID    atorvastatin (Lipitor) tab 10 mg, 10 mg, Per G Tube, Nightly    ticagrelor (Brilinta) tab 90  mg, 90 mg, Per G Tube, Q12H    ampicillin-sulbactam (Unasyn) 3 g in sodium chloride 0.9% 100mL IVPB-ADD, 3 g, Intravenous, Q6H    enoxaparin (Lovenox) 40 MG/0.4ML SUBQ injection 40 mg, 40 mg, Subcutaneous, Nightly    melatonin tab 3 mg, 3 mg, Oral, Nightly PRN    polyethylene glycol (PEG 3350) (Miralax) 17 g oral packet 17 g, 17 g, Oral, Daily PRN    sennosides (Senokot) tab 17.2 mg, 17.2 mg, Oral, Nightly PRN    bisacodyl (Dulcolax) 10 MG rectal suppository 10 mg, 10 mg, Rectal, Daily PRN    fleet enema (Fleet) rectal enema 133 mL, 1 enema, Rectal, Once PRN    ondansetron (Zofran) 4 MG/2ML injection 4 mg, 4 mg, Intravenous, Q6H PRN    metoclopramide (Reglan) 5 mg/mL injection 5 mg, 5 mg, Intravenous, Q8H PRN    benzonatate (Tessalon) cap 200 mg, 200 mg, Oral, TID PRN    guaiFENesin (Robitussin) 100 MG/5 ML oral liquid 200 mg, 200 mg, Oral, Q4H PRN    glycerin-hypromellose- (Artificial Tears) 0.2-0.2-1 % ophthalmic solution 1 drop, 1 drop, Both Eyes, QID PRN    sodium chloride (Saline Mist) 0.65 % nasal solution 1 spray, 1 spray, Each Nare, Q3H PRN    acetaminophen (Tylenol Extra Strength) tab 500 mg, 500 mg, Per G Tube, Q4H PRN    aspirin chewable tab 81 mg, 81 mg, Per G Tube, Daily    pantoprazole (Protonix) 40 mg in sodium chloride 0.9% PF 10 mL IV push, 40 mg, Intravenous, BID AC    Review of Systems:  A complete 10-point review of systems was completed with the patient.  Pertinent positives are noted above in the HPI.     Physical Exam:  /75 (BP Location: Right arm)   Pulse (!) 127   Temp 98.9 °F (37.2 °C) (Oral)   Resp (!) 29   Ht 5' 9\" (1.753 m)   Wt 134 lb (60.8 kg)   SpO2 (!) 88%   BMI 19.79 kg/m²     GENERAL APPEARANCE: Well developed and Comfortable.  COMMUNICATION: Voice strong with normal ability to communicate.  HEAD AND FACE: Normocephalic, Face symmetric at rest and with movement, and No lesions, sinus tenderness or masses.  RESPIRATORY: Chest symmetric with no increased work of  breathing.  NEUROLOGICAL: Mood and affect normal.  EYES: Gaze alignment and ocular motility normal.  RIGHT EAR: External inspection normal. Otoscopic exam with external ear canal normal. Tympanic membrane normal. Middle ear normal.  LEFT EAR: External inspection normal. Otoscopic exam with external ear canal normal. Tympanic membrane normal. Middle ear normal.   NOSE: External inspection normal. Anterior rhinoscopy with nasal mucosa dry and no drainage. Septum is no significant deviations visible anteriorly. Turbinates normal.  ORAL CAVITY: Lips, teeth, and gums normal. Tongue normal with symmetric movement. Oral mucosa very dry. Hard palate posterior hard palate covered in sheets of adherent brown/gray crusts, no bleeding.  OROPHARYNX: Soft palate and uvula with soft palate covered in brown/gray crusts. Right and left tonsil with postreatment changes, no ulceration but exam limited due to crusting. . Posterior pharyngeal wall thick mucus.  SALIVARY GLANDS: Normal with no tenderness, no enlargement, and no masses.  NECK: Neck inspection XRT changes, healed scar. Palpation of the neck XRT changes, no mass or other abnormality.  LYMPHATIC: Palpation of lymph nodes of the neck normal.  THYROID: Thyroid normal.    Flexible Laryngoscopy Procedure Note    Due to inability for adequate examination of the larynx and need for magnification to perform the examination, endoscopy was performed.  Risks and benefits were discussed with patient/family and they have given verbal consent to proceed.    Pre-operative Diagnosis:   Post-nasal drip    Post-operative Diagnosis: Same    Procedure: Diagnostic flexible fiberoptic laryngoscopy    Anesthesia: None    Surgeon: No name on file    EBL: 0cc    Procedure Details:  A flexible fiberoptic rhinolaryngoscope was passed into the right nasal cavity.  It was advanced through the nasopharyx and oropharynx. The hypopharynx, supraglottis and glottis were then examined. The mobility of the  true vocal cords was assessed. The scope was then withdrawn.    Findings: All the structures listed above in the procedure description appeared normal, except as follows:   Mucosa very dry   Oropharynx globs of thick yellow mucus scattered throughout  Larynx thick mucus scattered in the supraglottis , TVC mobile bilaterally with mild bowing. Cough weak with limited clearance of mucus. Airway widely patent.    Condition: Stable    Complications: Patient tolerated the procedure well with no immediate complications.      Laboratory Data:  Lab Results   Component Value Date    WBC 9.2 11/13/2024    HGB 8.8 11/13/2024    HCT 28.2 11/13/2024    .0 11/13/2024    CREATSERUM 1.05 11/13/2024    BUN 29 11/13/2024     11/13/2024    K 4.5 11/13/2024     11/13/2024    CO2 32.0 11/13/2024     11/13/2024    CA 9.3 11/13/2024    ALB 3.7 11/13/2024    ALKPHO 132 11/13/2024    BILT 0.9 11/13/2024    TP 7.3 11/13/2024    AST 27 11/13/2024    ALT 11 11/13/2024    PTT 30.1 11/13/2024    INR 1.41 11/13/2024    PTP 17.4 11/13/2024           Assessment/Plan:  Patient Active Problem List   Diagnosis    Dupuytren's contracture of both hands    Dupuytren's contracture of left hand    Community acquired pneumonia of left lung    Community acquired pneumonia of left lung, unspecified part of lung    Hemoptysis    Anticoagulated    Chronic atrial fibrillation ([facility])    Pure hypercholesterolemia    Acquired hypothyroidism    Syncope, near    Gastroenteritis    Elevated troponin    Community acquired pneumonia of left lower lobe of lung    Diarrhea    Vomiting    History of aortic valve replacement with bioprosthetic valve    Lytic bone lesions on xray    Elevated PSA    Subarachnoid hemorrhage (HCC)    Atrial fibrillation and flutter (HCC)    Subarachnoid hemorrhage following injury, no loss of consciousness (HCC)    Closed fracture of orbit ([facility])    Fall    Urinary tract infection without hematuria    Hypoxemia    Acute  respiratory failure with hypoxia (HCC)    Community acquired pneumonia, unspecified laterality    Aspiration pneumonia (HCC)    Malnutrition (HCC)    Septic shock (HCC)    Gram-negative pneumonia (HCC)    Dysphagia    Stenosis of right carotid artery    Anemia due to acute blood loss    Carotid artery disease (HCC)    Gastrointestinal hemorrhage    Melena    Status post carotid endarterectomy    Hyponatremia    GI bleed    Atrial fibrillation with rapid ventricular response (HCC)    Acute on chronic congestive heart failure, unspecified heart failure type (HCC)    Sepsis due to pneumonia (HCC)     Complex medical history, admitted for pneumonia and on ticagrelor with h/o T2N2b tonsil scca s/p chemoradiation 2010, post-treatment neck dissection 2011 with extensive palate/pharyngeal crusting that is likely multifactorial from severe xerostomia from XRT, NPO status, and weak cough. Exam and endoscopy with no airway obstruction, no e/o cancer recurrence (but limited due to crusting) but thick mucus in his pharynx/larynx. Recommend adding oral/nasal moisture to soften palate crusting with high humidity face mask, oral swabs, and saline nasal spray. Discussed importance of not picking at crusting because it may cause bleeding and infection, but once it is softer it can be removed. Plan also discussed with his wife. Recommend follow-up with Dr. Bush ([place]) after discharge. Call if questions/concerns or if condition worsens.     Thank you for allowing me to participate in the care of your patient.    Natalie Graff MD  11/13/2024  8:10 PM         [1]   Allergies  Allergen Reactions    Compazine [Prochlorperazine] HALLUCINATION

## 2024-11-14 NOTE — PLAN OF CARE
Assumed care of pt at 1930.  Pt is alert, speech difficult to understand d/t hoarseness and congestion. C/o SOB. O2 Sat 93% on 1.5L NC at beginning of shift, ending shift on 3L NC. Scabbing to palate and back of throat, oral care provided. Put on high humidity facemask, but patient refused to maintain mask use, switched to 3L nasal cannula. Suctioning of secretions contraindicated due to bleed risk r/t mouth and throat scabs. Photo taken of mouth and throat available in media.  PEG tube maintained, patient refuses to put gauze on insertion site.  Activity level: bedfast. Able to turn self  Monitor shows Afib. On Cardizem drip, stopped at 2000 due to SBP 80s-90s and rate controlled.  Call light & belongings within reach. Bed in lowest position with alarm on and wheels locked.   Updated on POC & verbalized understanding.  Urine culture sent.    POC:  -Collect sputum culture  -Continue oral care        Problem: Patient/Family Goals  Goal: Patient/Family Long Term Goal  Description: Patient's Long Term Goal: to go home    Interventions:  - See additional Care Plan goals for specific interventions  Outcome: Progressing  Goal: Patient/Family Short Term Goal  Description: Patient's Short Term Goal: to go home    Interventions:   - See additional Care Plan goals for specific interventions  Outcome: Progressing

## 2024-11-14 NOTE — SLP NOTE
Order received, chart reviewed. Patient with chronic PEG in place due to severe oropharyngeal dysphagia s/p head & neck cancer with neck dissection and chemoXRT. Patient on complete enteral means of nutrition/hydration/medication. SLP will sign off at this time.     Pardeep Phelps MS CCC-SLP  Pager [phone]

## 2024-11-14 NOTE — PROGRESS NOTES
Notes reviewed. Paged. Dr. Jones through [employer] but have not heard back yet. Spoke with YUDELKA Bellamy. Explained that the crusting is likely from a combination of the extreme dryness that results from a distant history of XRT to the pharynx combined with Wang' very weak cough and oral muscular strength/coordinaton that do not allow him to clear his mucus and saliva which are very thick. Per history he was also picking at this area which may have contributed. He had no evidence of cancer recurrence when he saw Dr. Bush this past summer. My impression is that the crusting is not a sign of recurrence, but until this crusting is softened and removed, that cannot be ruled out entirely. I do recommend he follow-up with Dr. Bush after discharge.     Per YUDELKA Bellamy he is in need of suctioning. Okay to suction with soft catheter in mouth or nasal cavity as needed, but recommend this not be used to try to remove dry crusting since that may trigger bleeding and inflammation. Continue hydration with humidified O2, oral swabs, and saline nasal spray.

## 2024-11-14 NOTE — OCCUPATIONAL THERAPY NOTE
8:40am : OT orders received and chart reviewed. Pt occupied with echo. Will follow and re-attempt as able and appropriate.      3:00pm : Attempted in the afternoon and pt politely declining to participate in OT evaluation at this time. Would like OT to return tomorrow morning. Will follow and re-attempt as able and appropriate

## 2024-11-15 NOTE — PAYOR COMM NOTE
--------------  11/15:  CONTINUED STAY REVIEW    Payor: [employer]PO  Subscriber #:  [id number]  Authorization Number: [id number]    Admit date: 11/13/24  Admit time:  2:12 PM      HOSPITALIST:    Chief Complaint: Generalized weakness        Subjective:  Patient is supplemental oxygen. Refusing humidified mask. Reports some cough and SOB. No pain. States he has been picking at his mouth despite encouragement not to do so.        Objective:  Review of Systems:   A comprehensive review of systems was completed; pertinent positive and negatives stated in subjective.     Vital signs:  Temp:  [97.6 °F (36.4 °C)-98.3 °F (36.8 °C)] 98 °F (36.7 °C)  Pulse:  [] 108  Resp:  [18-45] 40  BP: ()/(66-88) 120/88  SpO2:  [84 %-100 %] 95 %     Physical Exam:    General: No acute distress, Alert, Cachectic   HEENT: Posterior pharynx with extensive scabbing/crusting  Respiratory: Rhonchi bilaterally with audible upper airway secretions, no wheezes  Cardiovascular: S1, S2. IRIR  Abdomen: Soft, Non-tender, non-distended, positive bowel sounds  Extremities: No edema     Diagnostic Data:    Labs:        Recent Labs   Lab 11/13/24  1042 11/14/24  0523 11/15/24  0450   WBC 9.2 6.1 7.5   HGB 8.8* 7.2* 7.6*   MCV 93.4 94.8 95.5   .0 279.0 284.0   INR 1.41*  --   --             Recent Labs   Lab 11/13/24  1042 11/14/24  0524 11/15/24  0450   * 117* 132*   BUN 29* 29* 26*   CREATSERUM 1.05 0.90 0.88   CA 9.3 8.6* 8.9   ALB 3.7  --  3.1*    141 139   K 4.5 4.4 4.3    106 105   CO2 32.0 35.0* 32.0   ALKPHO 132*  --  113   AST 27  --  20   ALT 11  --  8*   BILT 0.9  --  0.6   TP 7.3  --  6.2           Assessment & Plan:  #Acute hypoxic respiratory failure due to PNA  -Abx for PNA  -Wean O2 as tolerated  -Encourage IS  -Pulm following     #Bilateral PNA  #Upper airway secretions  -PCT 0.23  -Sputum culture if able  -Strep/legionella urine antigens negative  -Follow blood cultures, NGTD  -Had  completed cipro 11/9. Started on empiric abx again with Unasyn. Completed Azithromycin last admission. 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 Elevated bioprosthetic valve gradient likely due to hyperdynamic state      #S/p right TCAR on 10/15/2024  -ASA, Brilinta, statin  -Dr. Jones following     #H/o head and neck cancer with dysphagia s/p PEG  -Tube feeds     #Scabbing/crusting in posterior pharynx  #Xerostomia  -ENT eval appreciated. Endoscopy without airway obstruction or evidence of cancer recurrence. There was thick mucus in pharynx. Management of secretions as above  -Encouraged him to not pick at the site  -F/u with his ENT Dr. Bush ([place]) on discharge     #Deconditioning  -PT/OT     Ben Ware, DO      MEDICATIONS ADMINISTERED IN LAST 1 DAY:  albuterol (Ventolin) (2.5 MG/3ML) 0.083% nebulizer solution 1.25 mg       Date Action Dose Route User    11/15/2024 0917 Given 1.25 mg Nebulization Daiana Ware, [facility]    11/15/2024 0212 Given 1.25 mg Nebulization Indiana Arriaza, [facility]    11/14/2024 1930 Given 1.25 mg Nebulization Mireya Tyson          ampicillin-sulbactam (Unasyn) 3 g in sodium chloride 0.9% 100mL IVPB-ADD       Date Action Dose Route User    11/14/2024 1438 New Bag 3 g Intravenous Mia Funes, RN          aspirin chewable tab 81 mg       Date Action Dose Route User    11/15/2024 0903 Given 81 mg Per G Tube Lucy Du, YUDELKA          atorvastatin (Lipitor) tab 10 mg       Date Action Dose Route User    11/14/2024 2019 Given 10 mg Per G  Tube Ken Ochoa RN          enoxaparin (Lovenox) 40 MG/0.4ML SUBQ injection 40 mg       Date Action Dose Route User    11/14/2024 2019 Given 40 mg Subcutaneous (Right Lower Abdomen) Ken Ochoa RN          guaiFENesin (Robitussin) 100 MG/5 ML oral liquid 200 mg       Date Action Dose Route User    11/15/2024 0903 Given 200 mg Oral Lucy Du RN          levothyroxine (Synthroid) tab 50 mcg       Date Action Dose Route User    11/15/2024 0608 Given 50 mcg Per G Tube Ken Ochoa RN          metoprolol tartrate (Lopressor) partial tab 12.5 mg       Date Action Dose Route User    11/15/2024 0903 Given 12.5 mg Per G Tube Lucy Du RN    11/14/2024 2019 Given 12.5 mg Per G Tube Ken Ochoa RN    11/14/2024 1440 Given 12.5 mg Per G Tube Mia Funes RN          pantoprazole (Protonix) 40 mg in sodium chloride 0.9% PF 10 mL IV push       Date Action Dose Route User    11/15/2024 0608 Given 40 mg Intravenous Ken Ochoa RN    11/14/2024 1846 Given 40 mg Intravenous Mia Funes RN          piperacillin-tazobactam (Zosyn) 3.375 g in dextrose 5% 100 mL IVPB-ADDV       Date Action Dose Route User    11/15/2024 1033 New Bag 3.375 g Intravenous Lucy Du RN    11/15/2024 0245 New Bag 3.375 g Intravenous Ken Ochoa RN    11/14/2024 1846 New Bag 3.375 g Intravenous Mia Funes RN          sodium chloride (Saline Mist) 0.65 % nasal solution 1 spray       Date Action Dose Route User    11/15/2024 0903 Given 1 spray Each Nare Lucy Du RN    11/14/2024 2019 Given 1 spray Each Nare Ken Ochoa RN    11/14/2024 1622 Given 1 spray Each Nare Mia Funes RN          sodium chloride 3 % nebulizer solution 3 mL       Date Action Dose Route User    11/15/2024 0917 Given 3 mL Nebulization Daiana Ware RCP    11/14/2024 1930 Given 3 mL Nebulization Mireya Tyson    11/14/2024 1344 Given 3 mL Nebulization Sancho Palacios RCP          ticagrelor (Brilinta) tab 90 mg       Date  Action Dose Route User    11/15/2024 0903 Given 90 mg Per G Tube Lucy Du, YUDELKA    11/14/2024 2019 Given 90 mg Per G Tube Ken Ochoa, RN            Vitals (last day)       Date/Time Temp Pulse Resp BP SpO2 Weight O2 Device O2 Flow Rate (L/min) [facility]    11/15/24 1100 97.8 °F (36.6 °C) 123 56 109/63 95 % -- Nasal cannula 7 L/min DT    11/15/24 1055 -- 118 -- -- -- -- -- -- VZ    11/15/24 0918 -- -- -- -- 94 % -- High flow nasal cannula 7 L/min DP    11/15/24 0902 -- 103 37 104/84 95 % -- -- -- SK    11/15/24 0847 -- -- -- -- 96 % -- High flow nasal cannula 7 L/min SK    11/15/24 0845 -- -- -- -- 92 % -- High flow nasal cannula 7 L/min SK    11/15/24 0839 -- -- -- -- 81 % -- High flow nasal cannula 4 L/min SK    11/15/24 0824 98 °F (36.7 °C) 113 40 106/83 94 % -- -- 4 L/min     11/15/24 0824 -- -- -- -- -- -- High flow nasal cannula -- SK    11/15/24 0500 98 °F (36.7 °C) 108 40 120/88 95 % -- Nasal cannula 4 L/min     11/15/24 0248 -- 121 45 -- 93 % -- -- --     11/15/24 0200 -- -- -- -- -- -- Nasal cannula 4 L/min MO    11/14/24 2334 98.3 °F (36.8 °C) 99 18 112/75 93 % -- Nasal cannula 3 L/min     11/14/24 2000 97.6 °F (36.4 °C) 110 24 103/69 98 % -- Nasal cannula 3 L/min KS    11/14/24 1656 97.7 °F (36.5 °C) 109 24 102/66 94 % -- Nasal cannula 3 L/min AL    11/14/24 1440 -- 120 -- 118/75 97 % -- -- -- JL    11/14/24 1439 -- 133 -- -- 95 % -- -- -- JL    11/14/24 1438 -- 114 -- -- 95 % -- -- -- JL    11/14/24 1436 -- 127 -- -- 96 % -- -- -- JL    11/14/24 1435 -- 119 -- -- 97 % -- -- -- JL    11/14/24 1434 -- 117 -- -- 97 % -- -- -- JL    11/14/24 1433 -- 113 -- -- 96 % -- -- -- JL    11/14/24 1432 -- 117 -- -- 96 % -- -- -- JL    11/14/24 1431 -- 114 -- -- 98 % -- -- -- JL    11/14/24 1430 -- 122 -- -- 100 % -- -- -- JL    11/14/24 1429 -- 114 -- -- 97 % -- -- -- JL    11/14/24 1428 -- 106 -- -- 97 % -- -- -- JL    11/14/24 1427 -- 135 -- -- 97 % -- -- -- JL    11/14/24 1426 -- 107 -- -- 99 % -- -- --  JL    11/14/24 1425 -- 113 -- -- 98 % -- -- -- JL    11/14/24 1424 -- 107 -- -- 97 % -- -- -- JL    11/14/24 1423 -- 111 -- -- 97 % -- -- -- JL    11/14/24 1422 -- 119 -- -- 97 % -- -- -- JL    11/14/24 1421 -- 136 -- -- 97 % -- -- -- JL    11/14/24 1420 -- 108 -- -- 96 % -- -- -- JL    11/14/24 1419 -- 114 -- -- 96 % -- -- -- JL    11/14/24 1418 -- 118 -- -- 96 % -- -- -- JL    11/14/24 1417 -- 120 -- -- 96 % -- -- -- JL    11/14/24 1416 -- 120 -- -- 95 % -- -- -- JL    11/14/24 1415 -- 108 -- -- 95 % -- -- -- JL    11/14/24 1414 -- 110 -- -- 96 % -- -- -- JL    11/14/24 1413 -- 116 -- -- 95 % -- -- -- JL    11/14/24 1412 -- 127 -- -- 95 % -- -- -- JL    11/14/24 1411 -- 141 -- -- 95 % -- -- -- JL    11/14/24 1410 -- 127 -- -- 95 % -- -- -- JL    11/14/24 1409 -- 120 -- -- 95 % -- -- -- JL    11/14/24 1408 -- 124 -- -- 96 % -- -- -- JL    11/14/24 1407 -- 132 -- -- 96 % -- -- -- JL    11/14/24 1406 -- 118 -- -- 95 % -- -- -- JL    11/14/24 1405 -- 126 -- -- 95 % -- -- -- JL    11/14/24 1404 -- 106 -- -- 96 % -- -- -- JL    11/14/24 1403 -- 126 -- -- 96 % -- -- -- JL    11/14/24 1402 -- 115 -- -- 95 % -- -- -- JL    11/14/24 1401 -- 125 -- -- 94 % -- -- -- JL    11/14/24 1400 -- 119 -- -- 95 % -- -- -- JL    11/14/24 1359 -- 126 -- -- 95 % -- -- -- JL    11/14/24 1358 -- 127 -- -- 95 % -- -- -- JL    11/14/24 1357 -- 124 -- -- 96 % -- -- -- JL    11/14/24 1356 -- 123 -- -- 95 % -- -- -- JL    11/14/24 1355 -- 115 -- -- 94 % -- -- -- JL    11/14/24 1354 -- 107 -- -- 95 % -- -- -- JL    11/14/24 1353 -- 119 -- -- 96 % -- -- -- JL    11/14/24 1352 -- 118 -- -- 96 % -- -- -- JL    11/14/24 1351 -- 110 -- -- 97 % -- -- -- JL    11/14/24 1350 -- 122 -- -- 98 % -- -- -- JL    11/14/24 1349 -- 125 -- -- 99 % -- -- -- JL    11/14/24 1348 -- 119 -- -- 99 % -- -- -- JL    11/14/24 1347 -- 118 -- -- 98 % -- -- -- JL    11/14/24 1346 -- 113 -- -- 95 % -- -- -- JL    11/14/24 1345 -- 128 -- -- 97 % -- -- -- JL    11/14/24 1344 --  125 -- -- 97 % -- -- -- JL    11/14/24 1343 -- 115 -- -- 97 % -- -- -- JL    11/14/24 1342 -- 121 -- -- 96 % -- -- -- JL    11/14/24 1341 -- 120 -- -- 96 % -- -- -- JL    11/14/24 1340 -- 108 -- -- 97 % -- -- -- JL    11/14/24 1339 -- 117 -- -- 97 % -- -- -- JL    11/14/24 1338 -- 111 -- -- 96 % -- -- -- JL    11/14/24 1337 -- 101 -- -- 96 % -- -- -- JL    11/14/24 1336 -- 109 -- -- 97 % -- -- -- JL    11/14/24 1335 -- 119 -- -- 97 % -- -- -- JL    11/14/24 1334 -- 129 -- -- 97 % -- -- -- JL    11/14/24 1333 -- 113 -- -- 97 % -- -- -- JL    11/14/24 1332 -- 125 -- -- 98 % -- -- -- JL    11/14/24 1331 -- 105 -- -- 98 % -- -- -- JL    11/14/24 1330 -- 115 -- -- 97 % -- -- -- JL    11/14/24 1329 -- 129 -- -- 97 % -- -- -- JL    11/14/24 1328 -- 127 -- -- 99 % -- -- -- JL    11/14/24 1327 -- 124 -- -- 99 % -- -- -- JL    11/14/24 1326 -- 108 -- -- 99 % -- -- -- JL    11/14/24 1325 -- 120 -- -- 99 % -- -- -- JL    11/14/24 1324 -- 114 -- -- 99 % -- -- -- JL    11/14/24 1323 -- 118 -- -- 99 % -- -- -- JL    11/14/24 1322 -- 109 -- -- 99 % -- -- -- JL    11/14/24 1321 -- 117 -- -- 99 % -- -- -- JL    11/14/24 1320 -- 119 -- -- 99 % -- -- -- JL    11/14/24 1319 -- 102 -- -- 99 % -- -- -- JL    11/14/24 1318 -- 114 -- -- 98 % -- -- -- JL    11/14/24 1317 -- 124 -- -- 100 % -- -- -- JL    11/14/24 1316 -- 113 -- -- 99 % -- -- -- JL    11/14/24 1315 -- 112 -- -- 99 % -- -- -- JL    11/14/24 1314 -- 119 -- -- 99 % -- -- -- JL    11/14/24 1313 -- 121 -- -- 99 % -- -- -- JL    11/14/24 1312 -- 115 -- -- 99 % -- -- -- JL    11/14/24 1311 -- 121 -- -- 99 % -- -- -- JL    11/14/24 1310 -- 115 -- -- 99 % -- -- -- JL    11/14/24 1309 -- 112 -- -- 99 % -- -- -- JL    11/14/24 1308 -- 112 -- -- 98 % -- -- -- JL    11/14/24 1307 -- 113 -- -- 100 % -- -- -- JL    11/14/24 1306 -- 115 -- -- 100 % -- -- -- JL    11/14/24 1305 -- 117 -- -- 100 % -- -- -- JL    11/14/24 1304 -- 133 -- -- 100 % -- -- -- JL    11/14/24 1303 -- 116 -- -- 99 % --  -- -- JL    11/14/24 1302 -- 136 -- -- 99 % -- -- -- JL    11/14/24 1301 -- 114 -- -- 99 % -- -- -- JL    11/14/24 1300 -- 116 -- -- 98 % -- -- -- JL    11/14/24 1259 -- 110 -- -- 98 % -- -- -- JL    11/14/24 1258 -- 124 -- -- 97 % -- -- -- JL    11/14/24 1257 -- 120 -- -- 98 % -- -- -- JL    11/14/24 1256 -- 112 -- -- 98 % -- -- -- JL    11/14/24 1255 -- 128 -- -- 97 % -- -- -- JL    11/14/24 1254 -- 121 -- -- 96 % -- -- -- JL    11/14/24 1253 -- 126 -- -- 96 % -- -- -- JL    11/14/24 1252 -- 120 -- -- 95 % -- -- -- JL    11/14/24 1251 -- 118 -- -- 95 % -- -- -- JL    11/14/24 1250 -- 124 -- -- 94 % -- -- -- JL    11/14/24 1249 -- 130 -- -- 94 % -- -- -- JL    11/14/24 1248 -- 125 -- -- 94 % -- -- -- JL    11/14/24 1247 -- 120 -- 98/70 93 % -- -- -- JL    11/14/24 1246 -- -- -- -- 85 % -- -- -- JL    11/14/24 1245 -- 122 -- -- 85 % -- -- -- JL    11/14/24 1244 -- 129 -- -- 84 % -- -- -- JL    11/14/24 1243 -- 141 -- -- 85 % -- -- -- JL    11/14/24 1242 -- 119 -- -- 84 % -- -- -- JL    11/14/24 1241 -- 136 -- -- 85 % -- -- -- JL    11/14/24 1240 -- 129 -- -- 87 % -- -- -- JL    11/14/24 1239 -- 137 -- -- 89 % -- -- -- JL    11/14/24 1238 -- 123 -- -- 92 % -- -- -- JL    11/14/24 1237 -- 114 -- -- 95 % -- -- -- JL    11/14/24 1236 -- 124 -- -- 94 % -- -- -- JL    11/14/24 1235 -- 109 -- -- 97 % -- -- -- JL    11/14/24 1234 -- 124 -- -- 97 % -- -- -- JL    11/14/24 1233 -- 117 -- -- 97 % -- -- -- JL    11/14/24 1232 -- 128 -- -- 97 % -- -- -- JL    11/14/24 1231 -- 120 -- -- 96 % -- -- -- JL    11/14/24 1230 -- 120 -- -- 97 % -- -- -- JL    11/14/24 1229 -- 115 -- -- 97 % -- -- -- JL    11/14/24 1228 -- 121 -- -- 97 % -- -- -- JL    11/14/24 1227 -- 124 -- -- 97 % -- -- -- JL    11/14/24 1226 -- 117 -- -- 97 % -- -- -- JL    11/14/24 1225 -- 116 -- -- 97 % -- -- -- JL    11/14/24 1224 -- 107 -- -- 97 % -- -- -- JL    11/14/24 1223 -- 120 -- -- 99 % -- -- -- JL    11/14/24 1222 -- 120 -- -- 98 % -- -- -- JL    11/14/24  1221 -- 103 -- -- 98 % -- -- -- JL    11/14/24 1220 -- 125 -- -- 98 % -- -- -- JL    11/14/24 1219 -- 125 -- -- 97 % -- -- -- JL    11/14/24 1218 -- 114 -- -- 98 % -- -- -- JL    11/14/24 1217 -- 102 -- -- 100 % -- -- -- JL    11/14/24 1216 -- 100 -- -- 99 % -- -- -- JL    11/14/24 1215 -- 114 -- -- 98 % -- -- -- JL    11/14/24 1214 -- 113 -- -- 98 % -- -- -- JL    11/14/24 1213 -- 93 -- -- 100 % -- -- -- JL    11/14/24 1212 -- 109 -- -- 100 % -- -- -- JL    11/14/24 1211 -- 102 -- -- 99 % -- -- -- JL    11/14/24 1210 -- 104 -- -- 100 % -- -- -- JL    11/14/24 1209 -- 121 -- -- 99 % -- -- -- JL    11/14/24 1208 -- 107 -- -- 98 % -- -- -- JL    11/14/24 1207 -- 119 -- -- 98 % -- -- -- JL    11/14/24 1206 -- 121 -- -- 100 % -- -- -- JL    11/14/24 1205 -- 120 -- -- 98 % -- -- -- JL    11/14/24 1204 -- 122 -- -- 98 % -- -- -- JL    11/14/24 1203 -- 113 -- -- 97 % -- -- -- JL    11/14/24 1202 -- 120 -- -- 97 % -- -- -- JL    11/14/24 1201 -- 125 -- -- 97 % -- -- -- JL    11/14/24 1200 -- 119 -- -- 98 % -- -- -- JL    11/14/24 1159 -- 110 -- -- 96 % -- -- -- JL    11/14/24 1158 97.7 °F (36.5 °C) 121 26 101/69 97 % -- Nasal cannula 3 L/min AL    11/14/24 1157 -- 107 -- -- -- -- -- -- JL    11/14/24 1156 -- 111 -- -- 96 % -- -- -- JL    11/14/24 1155 -- 132 -- -- 96 % -- -- -- JL    11/14/24 1154 -- 114 -- -- 96 % -- -- -- JL    11/14/24 1153 -- 113 -- -- 95 % -- -- -- JL    11/14/24 1152 -- 125 -- -- 95 % -- -- -- JL    11/14/24 1151 -- 119 -- -- 95 % -- -- -- JL    11/14/24 1150 -- 116 -- -- 95 % -- -- -- JL    11/14/24 1149 -- 120 -- -- 95 % -- -- -- JL    11/14/24 1148 -- 117 -- -- 98 % -- -- -- JL    11/14/24 1147 -- 113 -- -- 95 % -- -- -- JL    11/14/24 1146 -- 123 -- -- 96 % -- -- -- JL    11/14/24 1145 -- 111 -- -- 96 % -- -- -- JL    11/14/24 1144 -- 126 -- -- 96 % -- -- -- JL    11/14/24 1143 -- 121 -- -- 95 % -- -- -- JL    11/14/24 1142 -- 111 -- -- 96 % -- -- -- JL    11/14/24 1141 -- 115 -- -- 97 % -- -- --  JL    11/14/24 1140 -- 103 -- -- 97 % -- -- -- JL    11/14/24 1139 -- 115 -- -- 96 % -- -- -- JL    11/14/24 1138 -- 131 -- -- 96 % -- -- -- JL    11/14/24 1137 -- 125 -- -- 97 % -- -- -- JL    11/14/24 1136 -- 122 -- -- 96 % -- -- -- JL    11/14/24 1135 -- 116 -- -- 96 % -- -- -- JL    11/14/24 1134 -- 113 -- -- 96 % -- -- -- JL    11/14/24 1133 -- 116 -- -- 97 % -- -- -- JL    11/14/24 1132 -- 112 -- -- 98 % -- -- -- JL    11/14/24 1131 -- 107 -- -- 98 % -- -- -- JL    11/14/24 1130 -- 115 -- -- 97 % -- -- -- JL    11/14/24 1129 -- 117 -- -- 97 % -- -- -- JL    11/14/24 1128 -- 108 -- -- 98 % -- -- -- JL    11/14/24 1127 -- 116 -- -- 98 % -- -- -- JL    11/14/24 1126 -- 114 -- -- 97 % -- -- -- JL    11/14/24 1125 -- 121 -- -- 97 % -- -- -- JL    11/14/24 1124 -- 115 -- -- 97 % -- -- -- JL    11/14/24 1123 -- 123 -- -- 97 % -- -- -- JL    11/14/24 1122 -- 108 -- -- 97 % -- -- -- JL    11/14/24 1121 -- 125 -- -- 97 % -- -- -- JL    11/14/24 1120 -- 124 -- -- 96 % -- -- -- JL    11/14/24 1119 -- 121 -- -- 96 % -- -- -- JL    11/14/24 1118 -- 106 -- -- 96 % -- -- -- JL    11/14/24 1117 -- 108 -- -- 99 % -- -- -- JL    11/14/24 1116 -- 111 -- -- 97 % -- -- -- JL    11/14/24 1115 -- 109 -- -- 98 % -- -- -- JL    11/14/24 1114 -- 96 -- -- 98 % -- -- -- JL    11/14/24 1113 -- 107 -- -- 98 % -- -- -- JL    11/14/24 1112 -- 116 -- -- 96 % -- -- -- JL    11/14/24 1111 -- 118 -- -- 97 % -- -- -- JL    11/14/24 1110 -- 99 -- -- 99 % -- -- -- JL    11/14/24 1109 -- 109 -- -- 99 % -- -- -- JL    11/14/24 1108 -- 114 -- -- 97 % -- -- -- JL    11/14/24 1107 -- 124 -- -- 97 % -- -- -- JL    11/14/24 1106 -- 112 -- -- 98 % -- -- -- JL    11/14/24 1105 -- 96 -- -- 97 % -- -- -- JL    11/14/24 1104 -- 114 -- -- 97 % -- -- -- JL    11/14/24 1103 -- 128 -- -- 96 % -- -- -- JL    11/14/24 1102 -- 106 -- -- 98 % -- -- -- JL    11/14/24 1101 -- 116 -- -- 98 % -- -- -- JL    11/14/24 1100 -- 118 -- -- 96 % -- -- -- JL    11/14/24 1059 --  109 -- -- 96 % -- -- -- JL    11/14/24 1058 -- 133 -- -- 96 % -- -- -- JL    11/14/24 1057 -- 119 -- -- 96 % -- -- -- JL    11/14/24 1056 -- 115 -- -- 96 % -- -- -- JL    11/14/24 1055 -- 121 -- -- 97 % -- -- -- JL    11/14/24 1054 -- 117 -- -- 96 % -- -- -- JL    11/14/24 1053 -- 114 -- -- 96 % -- -- -- JL    11/14/24 1052 -- 120 -- -- 97 % -- -- -- JL    11/14/24 1051 -- 114 -- -- 96 % -- -- -- JL    11/14/24 1050 -- 122 -- -- 96 % -- -- -- JL    11/14/24 1049 -- 125 -- -- 97 % -- -- -- JL    11/14/24 1048 -- 110 -- -- 97 % -- -- -- JL    11/14/24 1047 -- 119 -- -- 97 % -- -- -- JL    11/14/24 1046 -- 120 -- -- 96 % -- -- -- JL    11/14/24 1045 -- 112 -- -- 96 % -- -- -- JL    11/14/24 1044 -- 123 -- -- 96 % -- -- -- JL    11/14/24 1043 -- 114 -- -- 96 % -- -- -- JL    11/14/24 1042 -- 118 -- -- 97 % -- -- -- JL    11/14/24 1041 -- 113 -- -- 97 % -- -- -- JL    11/14/24 1040 -- 115 -- -- 97 % -- -- -- JL    11/14/24 1039 -- 123 -- -- 97 % -- -- -- JL    11/14/24 1038 -- 123 -- -- 97 % -- -- -- JL    11/14/24 1037 -- 115 -- -- 97 % -- -- -- JL    11/14/24 1036 -- 119 -- -- 98 % -- -- -- JL    11/14/24 1035 -- 129 -- -- 98 % -- -- -- JL    11/14/24 1034 -- 108 -- -- 97 % -- -- -- JL    11/14/24 1033 -- 114 -- -- 95 % -- -- -- JL    11/14/24 1032 -- 112 -- -- 97 % -- -- -- JL    11/14/24 1031 -- 110 -- -- 97 % -- -- -- JL    11/14/24 1030 -- 119 -- -- 97 % -- -- -- JL    11/14/24 1029 -- 112 -- -- 96 % -- -- -- JL    11/14/24 1028 -- 105 -- -- 96 % -- -- -- JL    11/14/24 1027 -- 123 -- -- 96 % -- -- -- JL    11/14/24 1026 -- 105 -- -- 97 % -- -- -- JL    11/14/24 1025 -- 110 -- -- 97 % -- -- -- JL    11/14/24 1024 -- 115 -- -- 97 % -- -- -- JL    11/14/24 1023 -- 114 -- -- 97 % -- -- -- JL    11/14/24 1022 -- 119 -- -- 98 % -- -- -- JL    11/14/24 1021 -- 112 -- -- 100 % -- -- -- JL    11/14/24 1020 -- 116 -- -- 100 % -- -- -- JL    11/14/24 1019 -- 111 -- -- 98 % -- -- -- JL    11/14/24 1018 -- 109 -- -- 98 % -- --  -- JL    11/14/24 1017 -- 111 -- -- 100 % -- -- -- JL    11/14/24 1016 -- 113 -- -- 100 % -- -- -- JL    11/14/24 1015 -- 114 -- -- 98 % -- -- -- JL    11/14/24 1014 -- 108 -- -- 100 % -- -- -- JL    11/14/24 1013 -- 106 -- -- 99 % -- -- -- JL    11/14/24 1012 -- 100 -- -- 100 % -- -- -- JL    11/14/24 1011 -- 111 -- -- 100 % -- -- -- JL    11/14/24 1010 -- 121 -- -- 100 % -- -- -- JL    11/14/24 1009 -- 115 -- -- 100 % -- -- -- JL    11/14/24 1008 -- 104 -- -- 99 % -- -- -- JL    11/14/24 1007 -- 122 -- -- -- -- -- -- JL    11/14/24 1006 -- 105 -- -- 100 % -- -- -- JL    11/14/24 1005 -- 110 -- -- -- -- -- -- JL    11/14/24 1004 -- 119 -- -- 100 % -- -- -- JL    11/14/24 1003 -- 97 -- -- 100 % -- -- -- JL    11/14/24 1002 -- 104 -- -- -- -- -- -- JL    11/14/24 1001 -- 112 -- -- 100 % -- -- -- JL    11/14/24 1000 -- 130 -- -- 100 % -- -- -- JL    11/14/24 0959 -- 109 -- -- -- -- -- -- JL    11/14/24 0958 -- 117 -- -- -- -- -- -- JL    11/14/24 0957 -- 104 -- -- -- -- -- -- JL    11/14/24 0956 -- 117 -- -- 100 % -- -- -- JL    11/14/24 0955 -- 106 -- -- -- -- -- -- JL    11/14/24 0954 -- 110 -- -- 100 % -- -- -- JL    11/14/24 0953 -- 103 -- -- 100 % -- -- -- JL    11/14/24 0952 -- 108 -- -- 100 % -- -- -- JL    11/14/24 0951 -- 105 -- -- 99 % -- -- -- JL    11/14/24 0950 -- 101 -- -- 98 % -- -- -- JL    11/14/24 0949 -- 113 -- -- 98 % -- -- -- JL    11/14/24 0948 -- 120 -- -- 98 % -- -- -- JL    11/14/24 0947 -- 110 -- -- 98 % -- -- -- JL    11/14/24 0946 -- 121 -- -- 98 % -- -- -- JL    11/14/24 0945 -- 109 -- -- 95 % -- -- -- JL    11/14/24 0944 -- 118 -- -- 96 % -- -- -- JL    11/14/24 0943 -- 117 -- -- 97 % -- -- -- JL    11/14/24 0942 -- 118 -- -- 96 % -- -- -- JL    11/14/24 0941 -- 117 -- -- 98 % -- -- -- JL    11/14/24 0940 -- 107 -- -- 99 % -- -- -- JL    11/14/24 0939 -- 118 -- -- 97 % -- -- -- JL    11/14/24 0938 -- 126 -- -- 100 % -- -- -- JL    11/14/24 0937 -- 114 -- -- -- -- -- -- JL    11/14/24 0936  -- 100 -- -- 100 % -- -- -- JL    11/14/24 0935 -- 117 -- -- 100 % -- -- -- JL    11/14/24 0934 -- 128 -- -- 98 % -- -- -- JL    11/14/24 0933 -- 129 -- -- 100 % -- -- -- JL    11/14/24 0932 -- 117 -- -- 100 % -- -- -- JL    11/14/24 0931 -- 132 -- -- 96 % -- -- -- JL    11/14/24 0930 -- 121 -- -- 94 % -- -- -- JL    11/14/24 0929 -- 115 -- -- 98 % -- -- -- JL    11/14/24 0928 -- 104 -- -- 98 % -- -- -- JL    11/14/24 0927 -- 115 -- -- 98 % -- -- -- JL    11/14/24 0926 -- 111 -- -- 97 % -- -- -- JL    11/14/24 0925 -- 101 -- -- 97 % -- -- -- JL    11/14/24 0924 -- 133 -- -- -- -- -- -- JL    11/14/24 0923 -- 117 -- -- 87 % -- -- -- JL    11/14/24 0922 -- 131 -- -- 95 % -- -- -- JL    11/14/24 0921 -- 147 -- -- 97 % -- -- -- JL    11/14/24 0920 -- 127 -- -- 100 % -- -- -- JL    11/14/24 0919 -- 112 -- -- 100 % -- -- -- JL    11/14/24 0918 -- 115 -- -- 100 % -- -- -- JL    11/14/24 0917 -- 109 -- -- 99 % -- -- -- JL    11/14/24 0916 -- 106 -- -- 98 % -- -- -- JL    11/14/24 0915 -- 96 -- -- 97 % -- -- -- JL    11/14/24 0914 -- 125 -- -- 96 % -- -- -- JL    11/14/24 0913 -- 120 -- -- 97 % -- -- -- JL    11/14/24 0912 -- 112 -- -- 96 % -- -- -- JL    11/14/24 0911 -- 115 -- -- 97 % -- -- -- JL    11/14/24 0910 -- 125 -- -- 97 % -- -- -- JL    11/14/24 0909 -- 136 -- -- 96 % -- -- -- JL    11/14/24 0908 -- 124 -- -- 96 % -- -- -- JL    11/14/24 0907 -- 113 -- -- -- -- -- -- JL    11/14/24 0906 -- 110 -- -- -- -- -- -- JL    11/14/24 0905 -- 105 -- -- 97 % -- -- -- JL    11/14/24 0904 -- 125 -- -- 97 % -- -- -- JL    11/14/24 0903 -- 134 -- -- 98 % -- -- -- JL    11/14/24 0902 -- 119 -- -- 98 % -- -- -- JL    11/14/24 0901 -- 118 -- -- 96 % -- -- -- JL    11/14/24 0900 -- 107 -- -- 97 % -- -- -- JL    11/14/24 0859 -- 110 -- -- 98 % -- -- -- JL    11/14/24 0858 -- 109 -- -- 97 % -- -- -- JL    11/14/24 0857 -- 120 -- -- 97 % -- -- -- JL    11/14/24 0856 -- 119 -- -- 96 % -- -- -- JL    11/14/24 0855 -- 120 -- -- 96 % --  -- -- JL    11/14/24 0854 -- 102 -- -- 96 % -- -- -- JL    11/14/24 0853 -- 109 -- -- 96 % -- -- -- JL    11/14/24 0852 -- 118 -- -- 96 % -- -- -- JL    11/14/24 0851 -- 104 -- -- 97 % -- -- -- JL    11/14/24 0850 -- 126 -- -- 96 % -- -- -- JL    11/14/24 0849 -- 114 -- -- 97 % -- -- -- JL    11/14/24 0848 -- 115 -- -- 96 % -- -- -- JL    11/14/24 0847 -- 104 -- -- 96 % -- -- -- JL    11/14/24 0846 -- 121 -- -- 98 % -- -- -- JL    11/14/24 0845 -- 105 -- -- 98 % -- -- -- JL    11/14/24 0844 -- 110 -- -- 96 % -- -- -- JL    11/14/24 0843 -- 113 -- -- 96 % -- -- -- JL    11/14/24 0842 -- 106 -- -- 96 % -- -- -- JL    11/14/24 0841 -- 129 -- -- 96 % -- -- -- JL    11/14/24 0840 -- 118 -- -- 97 % -- -- -- JL    11/14/24 0839 -- 119 -- -- 96 % -- -- -- JL    11/14/24 0838 -- 106 -- -- 97 % -- -- -- JL    11/14/24 0837 -- 109 -- -- 97 % -- -- -- JL    11/14/24 0836 -- 113 -- -- 97 % -- -- -- JL    11/14/24 0835 -- 117 -- -- 98 % -- -- -- JL    11/14/24 0834 -- 125 -- -- 97 % -- -- -- JL    11/14/24 0833 -- 104 -- -- 97 % -- -- -- JL    11/14/24 0832 -- 106 -- -- 98 % -- -- -- JL    11/14/24 0831 -- 102 -- -- 99 % -- -- -- JL    11/14/24 0830 -- 120 -- -- 98 % -- -- -- JL    11/14/24 0829 -- 110 -- -- 98 % -- -- -- JL    11/14/24 0828 -- 118 -- -- 98 % -- -- -- JL    11/14/24 0827 -- 108 -- -- 99 % -- -- -- JL    11/14/24 0826 -- 124 -- -- 97 % -- -- -- JL    11/14/24 0825 -- 126 -- -- 98 % -- -- -- JL    11/14/24 0824 -- 113 -- -- 98 % -- -- -- JL    11/14/24 0823 -- 116 -- -- 98 % -- -- -- JL    11/14/24 0822 -- 112 -- -- 98 % -- -- -- JL    11/14/24 0821 -- 105 -- -- 98 % -- -- -- JL    11/14/24 0820 -- 107 -- -- 97 % -- -- -- JL    11/14/24 0819 -- 115 -- -- 97 % -- -- -- JL    11/14/24 0818 -- 113 -- -- 97 % -- -- -- JL    11/14/24 0817 -- 111 -- -- 97 % -- -- -- JL    11/14/24 0816 -- 124 -- -- 97 % -- -- -- JL    11/14/24 0815 -- 106 -- -- 97 % -- -- -- JL    11/14/24 0814 -- 118 -- -- 98 % -- -- -- JL    11/14/24  0813 -- 114 -- -- -- -- -- -- JL    11/14/24 0812 -- 116 -- -- 97 % -- -- -- JL    11/14/24 0811 -- 109 -- -- 97 % -- -- -- JL    11/14/24 0810 -- 106 -- -- 99 % -- -- -- JL    11/14/24 0809 -- 119 -- -- 98 % -- -- -- JL    11/14/24 0808 -- 116 -- -- 97 % -- -- -- JL    11/14/24 0807 -- 126 -- -- 98 % -- -- -- JL    11/14/24 0806 -- 114 -- -- 97 % -- -- -- JL    11/14/24 0805 -- 104 -- -- 98 % -- -- -- JL    11/14/24 0804 -- 106 -- -- 99 % -- -- -- JL    11/14/24 0803 -- 118 -- -- 98 % -- -- -- JL    11/14/24 0802 -- 126 -- -- 98 % -- -- -- JL    11/14/24 0801 -- 118 -- -- 97 % -- -- -- JL    11/14/24 0800 -- 123 -- -- 96 % -- -- -- JL    11/14/24 0759 -- 110 -- -- 96 % -- -- -- JL    11/14/24 0758 -- 126 -- -- 95 % -- -- -- JL    11/14/24 0757 -- 109 -- -- 96 % -- -- -- JL    11/14/24 0756 -- 107 -- -- 96 % -- -- -- JL    11/14/24 0755 -- 112 -- -- 95 % -- -- -- JL    11/14/24 0754 -- 107 -- -- 96 % -- -- -- JL    11/14/24 0753 -- 113 -- -- 97 % -- -- -- JL    11/14/24 0752 -- 112 -- -- 96 % -- -- -- JL    11/14/24 0751 -- 125 -- -- 96 % -- -- -- JL    11/14/24 0750 -- 115 -- -- 96 % -- -- -- JL    11/14/24 0749 -- 114 -- -- 98 % -- -- -- JL    11/14/24 0748 -- 128 -- -- 98 % -- -- -- JL    11/14/24 0747 -- 110 -- -- 97 % -- -- -- JL    11/14/24 0746 97.7 °F (36.5 °C) 109 24 97/70 98 % -- Nasal cannula 3 L/min AL    11/14/24 0745 -- 110 -- -- 99 % -- -- -- JL    11/14/24 0744 -- 111 -- -- 97 % -- -- -- JL    11/14/24 0743 -- 111 -- -- 98 % -- -- -- JL    11/14/24 0742 -- 115 -- -- 98 % -- -- -- JL    11/14/24 0741 -- 113 -- -- 100 % -- -- -- JL    11/14/24 0740 -- 103 -- -- 98 % -- -- -- JL    11/14/24 0739 -- 103 -- -- 98 % -- -- -- JL    11/14/24 0738 -- 117 -- -- 97 % -- -- -- JL    11/14/24 0737 -- 100 -- -- 96 % -- -- -- JL    11/14/24 0736 -- 113 -- -- 96 % -- -- -- JL    11/14/24 0735 -- 120 -- -- 97 % -- -- -- JL    11/14/24 0734 -- 122 -- -- 97 % -- -- -- JL    11/14/24 0733 -- 116 -- -- 97 % -- -- -- JL     11/14/24 0732 -- 120 -- -- 98 % -- -- -- JL    11/14/24 0731 -- 116 -- -- 98 % -- -- -- JL    11/14/24 0730 -- 107 -- -- 100 % -- -- -- JL    11/14/24 0729 -- 96 -- -- 100 % -- -- -- JL    11/14/24 0728 -- 107 -- -- 99 % -- -- -- JL    11/14/24 0727 -- 106 -- -- 99 % -- -- -- JL    11/14/24 0726 -- 109 -- -- 99 % -- -- -- JL    11/14/24 0725 -- 112 -- -- 98 % -- -- -- JL    11/14/24 0724 -- 109 -- -- 97 % -- -- -- JL    11/14/24 0723 -- 110 -- -- 97 % -- -- -- JL    11/14/24 0722 -- 104 -- -- 97 % -- -- -- JL    11/14/24 0721 -- 131 -- -- 96 % -- -- -- JL    11/14/24 0720 -- 118 -- -- 96 % -- -- -- JL    11/14/24 0719 -- 109 -- -- 96 % -- -- -- JL    11/14/24 0718 -- 120 -- -- 99 % -- -- -- JL    11/14/24 0717 -- 99 -- -- 100 % -- -- -- JL    11/14/24 0716 -- 107 -- -- 100 % -- -- -- JL    11/14/24 0715 -- 113 -- -- 99 % -- -- -- JL    11/14/24 0714 -- 105 -- -- 97 % -- -- -- JL    11/14/24 0713 -- 109 -- -- 98 % -- -- -- JL    11/14/24 0712 -- 111 -- -- 97 % -- -- -- JL    11/14/24 0711 -- 115 -- -- 97 % -- -- -- JL    11/14/24 0710 -- 111 -- -- 97 % -- -- -- JL    11/14/24 0709 -- 114 -- -- 96 % -- -- -- JL    11/14/24 0708 -- 116 -- -- 96 % -- -- -- JL    11/14/24 0707 -- 115 -- -- 96 % -- -- -- JL    11/14/24 0706 -- 109 -- -- 98 % -- -- -- JL    11/14/24 0705 -- 118 -- -- 97 % -- -- -- JL    11/14/24 0704 -- 109 -- -- 96 % -- -- -- JL    11/14/24 0703 -- 113 -- -- 96 % -- -- -- JL    11/14/24 0702 -- 124 -- -- 96 % -- -- -- JL    11/14/24 0701 -- 123 -- -- 97 % -- -- -- JL    11/14/24 0700 -- 114 -- -- 98 % -- -- -- JL

## 2024-11-15 NOTE — PLAN OF CARE
RN SHIFT NOTE    Assumed care of pt at 0700. No c/o pain at this time. A&O x 4, can be forgetful at times. Voice is hoarse and speech can be incomprehensible at times. Afib on tele. BLE edema present. Lung sounds with rhonchi. Dyspenic and tachypenic. On 7L HFNC, attempted to wean o2 in AM, but O2 sats decreased <90%. Pt has congested, non-productive cough. Unable to suction patient d/t scabbing present to back of throat. Abdomen soft and round. PEG tube to LUQ. Pt has bolus tube feedings that are from home at 9,1, and 5.  Last BM on 11/13. Continent of B&B. Call light within reach. Tolerating medications and care needs have been met at this time. .     POC:NPO, tube feeds, wean O2 as able, IV abx q 8,  sputum cx if able.     Problem: Patient/Family Goals  Goal: Patient/Family Long Term Goal  Description: Patient's Long Term Goal: stay healthy    Interventions:  - continue medication regimine  - follow up with physicians  - See additional Care Plan goals for specific interventions  Outcome: Progressing  Goal: Patient/Family Short Term Goal  Description: Patient's Short Term Goal: go home    Interventions:   - IV abx  - cardiology consult  - ent consult  - telemetry   - See additional Care Plan goals for specific interventions  Outcome: Progressing     Problem: CARDIOVASCULAR - ADULT  Goal: Maintains optimal cardiac output and hemodynamic stability  Description: INTERVENTIONS:  - Monitor vital signs, rhythm, and trends  - Monitor for bleeding, hypotension and signs of decreased cardiac output  - Evaluate effectiveness of vasoactive medications to optimize hemodynamic stability  - Monitor arterial and/or venous puncture sites for bleeding and/or hematoma  - Assess quality of pulses, skin color and temperature  - Assess for signs of decreased coronary artery perfusion - ex. Angina  - Evaluate fluid balance, assess for edema, trend weights  Outcome: Progressing  Goal: Absence of cardiac arrhythmias or at  baseline  Description: INTERVENTIONS:  - Continuous cardiac monitoring, monitor vital signs, obtain 12 lead EKG if indicated  - Evaluate effectiveness of antiarrhythmic and heart rate control medications as ordered  - Initiate emergency measures for life threatening arrhythmias  - Monitor electrolytes and administer replacement therapy as ordered  Outcome: Progressing     Problem: RESPIRATORY - ADULT  Goal: Achieves optimal ventilation and oxygenation  Description: INTERVENTIONS:  - Assess for changes in respiratory status  - Assess for changes in mentation and behavior  - Position to facilitate oxygenation and minimize respiratory effort  - Oxygen supplementation based on oxygen saturation or ABGs  - Provide Smoking Cessation handout, if applicable  - Encourage broncho-pulmonary hygiene including cough, deep breathe, Incentive Spirometry  - Assess the need for suctioning and perform as needed  - Assess and instruct to report SOB or any respiratory difficulty  - Respiratory Therapy support as indicated  - Manage/alleviate anxiety  - Monitor for signs/symptoms of CO2 retention  Outcome: Not Progressing

## 2024-11-15 NOTE — PLAN OF CARE
Assumed patient at 1930. Alert and oriented x 3. Speech incomprehensible due to hoarseness and audible secretions. Afib on tele. Incontinent of bowel and bladder. Patient updated on care and verbalized understanding.     POC: IV antibiotics, nasal spray, HH face mask    0245: increased respiration. MD pagedouglas  Problem: Patient/Family Goals  Goal: Patient/Family Long Term Goal  Description: Patient's Long Term Goal: stay healthy    Interventions:  - continue medication regimine  - follow up with physicians  - See additional Care Plan goals for specific interventions  Outcome: Progressing  Goal: Patient/Family Short Term Goal  Description: Patient's Short Term Goal: go home    Interventions:   - IV abx  - cardiology consult  - ent consult  - telemetry   - See additional Care Plan goals for specific interventions  Outcome: Progressing     Problem: CARDIOVASCULAR - ADULT  Goal: Maintains optimal cardiac output and hemodynamic stability  Description: INTERVENTIONS:  - Monitor vital signs, rhythm, and trends  - Monitor for bleeding, hypotension and signs of decreased cardiac output  - Evaluate effectiveness of vasoactive medications to optimize hemodynamic stability  - Monitor arterial and/or venous puncture sites for bleeding and/or hematoma  - Assess quality of pulses, skin color and temperature  - Assess for signs of decreased coronary artery perfusion - ex. Angina  - Evaluate fluid balance, assess for edema, trend weights  Outcome: Progressing  Goal: Absence of cardiac arrhythmias or at baseline  Description: INTERVENTIONS:  - Continuous cardiac monitoring, monitor vital signs, obtain 12 lead EKG if indicated  - Evaluate effectiveness of antiarrhythmic and heart rate control medications as ordered  - Initiate emergency measures for life threatening arrhythmias  - Monitor electrolytes and administer replacement therapy as ordered  Outcome: Progressing     Problem: RESPIRATORY - ADULT  Goal: Achieves optimal ventilation  and oxygenation  Description: INTERVENTIONS:  - Assess for changes in respiratory status  - Assess for changes in mentation and behavior  - Position to facilitate oxygenation and minimize respiratory effort  - Oxygen supplementation based on oxygen saturation or ABGs  - Provide Smoking Cessation handout, if applicable  - Encourage broncho-pulmonary hygiene including cough, deep breathe, Incentive Spirometry  - Assess the need for suctioning and perform as needed  - Assess and instruct to report SOB or any respiratory difficulty  - Respiratory Therapy support as indicated  - Manage/alleviate anxiety  - Monitor for signs/symptoms of CO2 retention  Outcome: Progressing

## 2024-11-15 NOTE — PROGRESS NOTES
[facility]   part of [facility]     Hospitalist Progress Note     Wang Romero Patient Status:  Inpatient    1945 MRN [id number]   Location [facility] 8NE-A Attending Ben Ware,    Hosp Day # 2 PCP Colette Steele MD     Chief Complaint: Generalized weakness    Subjective:     Patient is supplemental oxygen. Refusing humidified mask. Reports some cough and SOB. No pain. States he has been picking at his mouth despite encouragement not to do so.    Objective:    Review of Systems:   A comprehensive review of systems was completed; pertinent positive and negatives stated in subjective.    Vital signs:  Temp:  [97.6 °F (36.4 °C)-98.3 °F (36.8 °C)] 98 °F (36.7 °C)  Pulse:  [] 108  Resp:  [18-45] 40  BP: ()/(66-88) 120/88  SpO2:  [84 %-100 %] 95 %    Physical Exam:    General: No acute distress, Alert, Cachectic   HEENT: Posterior pharynx with extensive scabbing/crusting  Respiratory: Rhonchi bilaterally with audible upper airway secretions, no wheezes  Cardiovascular: S1, S2. IRIR  Abdomen: Soft, Non-tender, non-distended, positive bowel sounds  Extremities: No edema    Diagnostic Data:    Labs:  Recent Labs   Lab 24  1042 24  0523 11/15/24  0450   WBC 9.2 6.1 7.5   HGB 8.8* 7.2* 7.6*   MCV 93.4 94.8 95.5   .0 279.0 284.0   INR 1.41*  --   --      Recent Labs   Lab 24  1042 24  0524 11/15/24  0450   * 117* 132*   BUN 29* 29* 26*   CREATSERUM 1.05 0.90 0.88   CA 9.3 8.6* 8.9   ALB 3.7  --  3.1*    141 139   K 4.5 4.4 4.3    106 105   CO2 32.0 35.0* 32.0   ALKPHO 132*  --  113   AST 27  --  20   ALT 11  --  8*   BILT 0.9  --  0.6   TP 7.3  --  6.2     Estimated Creatinine Clearance: 57.6 mL/min (based on SCr of 0.88 mg/dL).    Recent Labs   Lab 24  1042   TROPHS 31     Recent Labs   Lab 24  1042   PTP 17.4*   INR 1.41*      Microbiology  Hospital Encounter on 24   1. Blood Culture     Status: None (Preliminary  result)    Collection Time: 11/13/24 11:46 AM    Specimen: Blood,peripheral   Result Value Ref Range    Blood Culture Result No Growth 1 Day N/A     Imaging: Reviewed in Epic.    Medications:    albuterol  2.5 mg Nebulization Once    sodium chloride  3 mL Nebulization TID    piperacillin-tazobactam  3.375 g Intravenous Q8H    albuterol  1.25 mg Nebulization 4 times per day    levothyroxine  50 mcg Per G Tube Before breakfast    metoprolol tartrate  12.5 mg Per G Tube BID    atorvastatin  10 mg Per G Tube Nightly    ticagrelor  90 mg Per G Tube Q12H    enoxaparin  40 mg Subcutaneous Nightly    aspirin  81 mg Per G Tube Daily    pantoprazole  40 mg Intravenous BID AC    sodium chloride  1 spray Each Nare TID       Assessment & Plan:      #Acute hypoxic respiratory failure due to PNA  -Abx for PNA  -Wean O2 as tolerated  -Encourage IS  -Pulm following     #Bilateral PNA  #Upper airway secretions  -PCT 0.23  -Sputum culture if able  -Strep/legionella urine antigens negative  -Follow blood cultures, NGTD  -Had completed cipro 11/9. Started on empiric abx again with Unasyn. Completed Azithromycin last admission. 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 Elevated bioprosthetic valve gradient likely due to hyperdynamic state      #S/p right TCAR on 10/15/2024  -ASA, Brilinta, statin  -Dr. Jones following     #H/o head and neck cancer with dysphagia s/p PEG  -Tube feeds     #Scabbing/crusting in posterior pharynx  #Xerostomia  -ENT eval appreciated. Endoscopy without airway  obstruction or evidence of cancer recurrence. There was thick mucus in pharynx. Management of secretions as above  -Encouraged him to not pick at the site  -F/u with his ENT Dr. Bush ([place]) on discharge    #Deconditioning  -PT/OT    Ben Ware,     Supplementary Documentation:     Quality:  DVT Mechanical Prophylaxis:   SCDs,    DVT Pharmacologic Prophylaxis   Medication    enoxaparin (Lovenox) 40 MG/0.4ML SUBQ injection 40 mg      DVT Pharmacologic prophylaxis: Aspirin 162 mg       Code Status: DNAR/Comfort Care  Gaitan: No urinary catheter in place  PRISCILA: TBD    Discharge is dependent on: Clinical state, consultant recs  At this point Mr. Romero is expected to be discharge to: Home    The 21st Century Cures Act makes medical notes like these available to patients in the interest of transparency. Please be advised this is a medical document. Medical documents are intended to carry relevant information, facts as evident, and the clinical opinion of the practitioner. The medical note is intended as peer to peer communication and may appear blunt or direct. It is written in medical language and may contain abbreviations or verbiage that are unfamiliar.     Dietitian Malnutrition Assessment    Evaluation for Malnutrition: Criteria for severe malnutrition diagnosis- chronic illness related to   Body fat severe depletion., Muscle mass severe depletion.               RD Malnutrition Care Plan: See RD nutrition assessment for additional recommendations.    Body Fat/Muscle Mass:          Physician Assessment     Patient has a diagnosis of severe malnutrition

## 2024-11-15 NOTE — PHYSICAL THERAPY NOTE
PHYSICAL THERAPY EVALUATION - INPATIENT     Room Number: 8607/8607-A  Evaluation Date: 11/15/2024  Type of Evaluation: Initial  Physician Order: PT Eval and Treat    Presenting Problem: Weakness, acute hypoxic respiratory failure due to PNA and possible CHF  Co-Morbidities : head and neck cancer, dysphagia s/p PEG, hypothyroidism, afib,  Reason for Therapy: Mobility Dysfunction and Discharge Planning    PHYSICAL THERAPY ASSESSMENT   Patient is a 78 year old male admitted 11/13/2024 for weakness, acute hypoxic respiratory failure due to PNA and possible CHF.  Prior to admission, patient's baseline is independen5.  Patient is currently functioning below baseline with bed mobility, transfers, and gait.  Patient is requiring minimal assist as a result of the following impairments: decreased functional strength, decreased endurance/aerobic capacity, pain, impaired static and dynamic balance, impaired motor planning, decreased muscular endurance, medical status, and increased O2 needs from baseline.  Physical Therapy will continue to follow for duration of hospitalization.    Patient will benefit from continued skilled PT Services to promote return to prior level of function and safety with continuous assistance and gradual rehabilitative therapy .    PLAN DURING HOSPITALIZATION  Nursing Mobility Recommendation : Lift Equipment (unable to fully determine due to pt refusing OOB activity. LIkely able to perform 1-2 person pivot transfer based off of bed mobiltiy performance.)     PT Treatment Plan: Bed mobility;Body mechanics;Endurance;Energy conservation;Patient education;Family education;Gait training;Balance training;Transfer training;Strengthening  Rehab Potential : Fair  Frequency (Obs): 3x/week     CURRENT GOALS    Goal #1 Patient is able to demonstrate supine - sit EOB @ level: modified independent     Goal #2 Patient is able to demonstrate transfers EOB to/from Chair/Wheelchair at assistance level: supervision      Goal #3 Patient is able to ambulate 150 feet with assist device: walker - rolling at assistance level: minimum assistance     Goal #4    Goal #5    Goal #6    Goal Comments: Goals established on 11/15/2024      PHYSICAL THERAPY MEDICAL/SOCIAL HISTORY  History related to current admission: Patient is a 78 year old male admitted on 11/13/2024 from home for weakness.  Pt diagnosed with acute hypoxic respiratory failure due to PNA and possible CHF.    HOME SITUATION  Type of Home: House  Home Layout: Two level                     Lives With: Spouse    Drives: No   Patient Regularly Uses: None      Prior Level of [place]: Pt typically indep with ADLs and mobility. Typically able to ascend to 2nd story of house. Per previous IPPT eval dated 10/28, pt ambulating 2 miles per day, denies recent falls.     SUBJECTIVE  \"I want to lay down\"     OBJECTIVE  Precautions: Bed/chair alarm (PEG; difficulty speaking due to head/neck ca)  Fall Risk: High fall risk    WEIGHT BEARING RESTRICTION     PAIN ASSESSMENT  Rating: Unable to rate  Location: generalized  Management Techniques: Relaxation    COGNITION  Overall Cognitive Status:  WFL - within functional limits  See OT and SLP evaluations for further assessment     RANGE OF MOTION AND STRENGTH ASSESSMENT  See OT note for UE assessment    Lower extremity ROM is within functional limits      Lower extremity strength is within functional limits     BALANCE  Static Sitting: Poor +  Dynamic Sitting: Poor +  Static Standing: Not tested  Dynamic Standing: Not tested    ADDITIONAL TESTS                                    ACTIVITY TOLERANCE  Pulse: (!) 126  Heart Rate Source: Monitor                   O2 WALK  Oxygen Therapy  SPO2% on Oxygen at Rest: 89  At rest oxygen flow (liters per minute): 6    NEUROLOGICAL FINDINGS                        AM-PAC '6-Clicks' INPATIENT SHORT FORM - BASIC MOBILITY  How much difficulty does the patient currently have...  Patient Difficulty: Turning  over in bed (including adjusting bedclothes, sheets and blankets)?: A Little   Patient Difficulty: Sitting down on and standing up from a chair with arms (e.g., wheelchair, bedside commode, etc.): A Little   Patient Difficulty: Moving from lying on back to sitting on the side of the bed?: A Little   How much help from another person does the patient currently need...   Help from Another: Moving to and from a bed to a chair (including a wheelchair)?: A Little   Help from Another: Need to walk in hospital room?: A Little   Help from Another: Climbing 3-5 steps with a railing?: A Lot     AM-PAC Score:  Raw Score: 17   Approx Degree of Impairment: 50.57%   Standardized Score (AM-PAC Scale): 42.13   CMS Modifier (G-Code): CK    FUNCTIONAL ABILITY STATUS  Gait Assessment   Functional Mobility/Gait Assessment  Gait Assistance: Not tested    Skilled Therapy Provided     Bed Mobility:  Rolling: NT  Supine to sit: supervision   Sit to supine: supervision     Transfer Mobility:  Sit to stand: NT   Stand to sit: NT  Gait = NT    Therapist's Comments: RN cleared for session. Pt agreeable for therapy, received supine. Pt momentarily sat EOB with cues, however almost immediately returned to supine due to wanting to return to bed and increased fatigue. Pt also with increasing RR and SpO2 in upper 80s on 6L, HR 120s. Based on bed mobility performance, pt likely able to perform at least 1-2 person pivot transfer to chair. Instructed to call for nursing staff for any needs and OOB mobility.     Exercise/Education Provided:  Bed mobility  Body mechanics  Energy conservation  Functional activity tolerated  Gait training  Posture  Strengthening  Transfer training    Patient End of Session: Up in chair;Needs met;Call light within reach;RN aware of session/findings;All patient questions and concerns addressed;Hospital anti-slip socks;Alarm set      Patient Evaluation Complexity Level:  History High - 3 or more personal factors and/or  co-morbidities   Examination of body systems Moderate - addressing a total of 3 or more elements   Clinical Presentation  Moderate - Evolving   Clinical Decision Making Moderate Complexity       PT Session Time: 20 minutes

## 2024-11-15 NOTE — PROGRESS NOTES
Progress Note  Wang Romero Patient Status:  Inpatient    1945 MRN [id number]   Location [facility] 8NE-A Attending Ben Ware,    Hosp Day # 2 PCP Colette Steele MD     Subjective:  Pt laying in bed, no apparent distress. Upper airway secretions noted.     Objective:  /63 (BP Location: Right arm)   Pulse (!) 123   Temp 97.8 °F (36.6 °C) (Oral)   Resp (!) 56   Ht 5' 9\" (1.753 m)   Wt 134 lb (60.8 kg)   SpO2 95%   BMI 19.79 kg/m²     Telemetry: afib 90's-100    Intake/Output:    Intake/Output Summary (Last 24 hours) at 11/15/2024 1300  Last data filed at 11/15/2024 0903  Gross per 24 hour   Intake 900 ml   Output 450 ml   Net 450 ml       Last 3 Weights   24 0139 134 lb (60.8 kg)   24 1032 134 lb (60.8 kg)   10/30/24 0500 134 lb 0.6 oz (60.8 kg)   10/29/24 0711 134 lb (60.8 kg)   10/28/24 0600 139 lb 15.9 oz (63.5 kg)   10/27/24 0909 142 lb 6.7 oz (64.6 kg)   10/27/24 0301 125 lb (56.7 kg)   10/23/24 2232 130 lb (59 kg)       Labs:  Recent Labs   Lab 24  1042 24  0524 11/15/24  0450   * 117* 132*   BUN 29* 29* 26*   CREATSERUM 1.05 0.90 0.88   EGFRCR 72 86 87   CA 9.3 8.6* 8.9    141 139   K 4.5 4.4 4.3    106 105   CO2 32.0 35.0* 32.0     Recent Labs   Lab 24  1042 24  0523 11/15/24  0450   RBC 3.02* 2.50* 2.64*   HGB 8.8* 7.2* 7.6*   HCT 28.2* 23.7* 25.2*   MCV 93.4 94.8 95.5   MCH 29.1 28.8 28.8   MCHC 31.2 30.4* 30.2*   RDW 15.1 15.4 15.3   NEPRELIM 7.66 4.79 6.31   WBC 9.2 6.1 7.5   .0 279.0 284.0         Recent Labs   Lab 24  1042   TROPHS 31       Diagnostics:  XR CHEST AP PORTABLE  (CPT=71045)    Result Date: 11/15/2024  CONCLUSION:  1. When compared to most recent chest radiograph dated 2024, there are persistent increasing airspace opacities bilaterally with small right pleural effusion.   LOCATION:  [facility]      Dictated by (CST): Erika Mcintyre MD on 11/15/2024 at 9:54 AM     Finalized by (CST):  Erika Mcintyre MD on 11/15/2024 at 9:57 AM       Review of Systems   Reason unable to perform ROS: limited d/t hoarse voice.       Physical Exam:    Gen: alert, cachectic  Heent: pupils equal, reactive. Mucous membranes moist.   Neck: no jvd  Cardiac: regular rate and rhythm, normal S1,S2, no murmur, clicks, rub or gallop  Lungs: coarse  Abd: soft, NT/ND +bs  Ext: no edema  Skin: Warm, dry  Neuro: No focal deficits      Medications:     albuterol  2.5 mg Nebulization Once    sodium chloride  3 mL Nebulization TID    piperacillin-tazobactam  3.375 g Intravenous Q8H    albuterol  1.25 mg Nebulization 4 times per day    levothyroxine  50 mcg Per G Tube Before breakfast    metoprolol tartrate  12.5 mg Per G Tube BID    atorvastatin  10 mg Per G Tube Nightly    ticagrelor  90 mg Per G Tube Q12H    enoxaparin  40 mg Subcutaneous Nightly    aspirin  81 mg Per G Tube Daily    pantoprazole  40 mg Intravenous BID AC    sodium chloride  1 spray Each Nare TID      dilTIAZem Stopped (11/13/24 2000)         Assessment:  Acute Hypoxic Respiratory Failure, Upper Airway Secretions, PNA  IV antibx, nebs  Pulm, ENT following  Permanent Atrial Fibrillation  Initially w/RVR on IV diltiazem  Now HR controlled on low dose BB  Echo LVEF 60-65%, no RWMA, mild AI, mild MR, PASP 60-65  Previously on Eliquis - stopped last admission d/t recurrent bleeding, anemia   Head/Neck Cancer s/p radiation  Dysphagia s/p PEG  Hx Carotid Artery Stenosis s/p TCAR 10/15/2024  On asa, brilinta  Hx Rheumatic Heart Disease s/p Bioprosthetic AVR, MV Repair 2008  Chronic Anemia, Recent GIB - Hgb 7's    Plan:  Continue lopressor 12.5mg po BID - afib rates controlled. Can uptitrate lopressor to 25mg twice daily if needed for improved rate control.  Continue asa, brilinta for recent TCAR  Continue statin   Cardiology will follow peripherally over the weekend. Feel free to call with any questions or concerns. F/U in [facility] clinic with Dr Chamberlain post discharge.     Plan of  care discussed with patient, RN.    Yahaira Donny, TIFFANY  11/15/2024  1:00 PM  [phone] [facility]  [phone] [facility]        Patient seen and examined independently.  Note reviewed and labs reviewed. Agree with above assessment and plan.  78-year-old male who presented acute hypoxic respiratory failure 2/2 upper respiratory congestion/secretions in the setting of posterior oropharynx mucosal disruption given history of head and neck cancer s/p radiation.  Unfortunately, patient does not have significant improvement as he is not tolerating humidified mask.  Clinically, no signs of volume overload.  At this point, recommending further supportive care.  Continue beta-blockers for rate control given underlying atrial fibrillation.  Cardiac service will follow peripherally over the weekend.  Please contact any questions or concerns arise.         Yury Ware DO  Cardiologist  [facility]  11/15/2024 1:43 PM      Note to the patient: The 21st Century Cures Act makes medical notes like these available to patients in the interest of transparency. However, be advised that this is a medical document. It is intended as peer to peer communication. It is written in medical language and may contain abbreviations or verbiage that are unfamiliar. It may appear blunt or direct. Medical documents are intended to carry relevant information, facts as evident, and clinical opinion of the practitioner.     Disclaimer: Components of this note were documented using voice recognition system and are subject to errors not corrected at proofreading. Contact the author of this note for any clarifications.

## 2024-11-15 NOTE — OCCUPATIONAL THERAPY NOTE
OCCUPATIONAL THERAPY EVALUATION - INPATIENT     Room Number: 8607/8607-A  Evaluation Date: 11/15/2024  Type of Evaluation: Initial  Presenting Problem: Atrial fibrillation with rapid ventricular response    Physician Order: IP Consult to Occupational Therapy  Reason for Therapy: ADL/IADL Dysfunction and Discharge Planning    OCCUPATIONAL THERAPY ASSESSMENT   Patient is currently functioning below baseline with toileting, lower body dressing, grooming, bed mobility, transfers, static sitting balance, dynamic sitting balance, static standing balance, dynamic standing balance, maintaining seated position, functional standing tolerance, energy conservation strategies, and aerobic capacity. Prior to admission, patient's baseline is modIND-assistance with bathing. Patient is requiring maximum assistance as a result of the following impairments: decreased functional strength, decreased functional reach, decreased endurance, impaired sitting balance, medical status, limited BUE ROM, and increased O2 needs from baseline. Occupational Therapy will continue to follow for duration of hospitalization.    Patient will benefit from continued skilled OT Services to promote return to prior level of function and safety with continuous assistance and gradual rehabilitative therapy    History Related to Current Admission: Patient is a 78 year old male admitted on 11/13/2024 with Presenting Problem: Atrial fibrillation with rapid ventricular response. Co-Morbidities : head and neck cancer, dysphagia s/p PEG, hypothyroidism, afib,    WEIGHT BEARING RESTRICTION       Recommendations for nursing staff:   Transfers: total lift  Toileting location: bedlevel    EVALUATION SESSION:  Patient Start of Session: semi supine in bed    FUNCTIONAL TRANSFER ASSESSMENT  Sit to Stand: Edge of Bed  Edge of Bed: Not Tested    BED MOBILITY  Supine to Sit : Supervision  Sit to Supine (OT): Supervision    BALANCE ASSESSMENT  Static Sitting: Stand-by  Assist  Static Standing: Not Tested  Dynamic Sitting: Not Tested  Dynamic Standing: Not Tested    FUNCTIONAL ADL ASSESSMENT  Grooming Seated: Maximum Assist (combed hair sitting EOB)  LB Dressing Seated: Maximum Assist (donned socks semi-supine in bed)    ACTIVITY TOLERANCE: Pt tolerated < 3 minutes sitting EOB; pt requesting to lie back down in bed as pt reporting fatigue with sitting upright.  Pulse: 118                      O2 SATURATIONS  Oxygen Therapy  SPO2% on Oxygen at Rest: 89  At rest oxygen flow (liters per minute): 6    COGNITION  Attention Span:  appears intact  Orientation Level:  oriented x4  Following Commands:  follows multistep commands with increased time and follows multistep commands with repetition    Upper Extremity   ROM: within functional limits; shoulder ROM tested during evaluation; pt unable to complete elbow ROM as pt reporting fatigue.  Strength: grossly ~ 3-/5  Coordination  Gross motor: WFL  Fine motor: Not formally tested  Sensation: No c/o numbness or tingling    EDUCATION PROVIDED  Patient Education : Role of Occupational Therapy; Plan of Care; Functional Transfer Techniques; Fall Prevention; Posture/Positioning; Energy Conservation; Proper Body Mechanics  Patient's Response to Education: Verbalized Understanding; Requires Further Education    Equipment used: none       Therapist comments: RN cleared pt for session, pt received semi supine in bed. Pt limited to responses as pt reports having difficulties with talking with PEG tube. This writer and PT asked questions with a yes or no format to ease communication and PLOF with pt. This session was limited due to pt experiencing fatigue and wanting to lie back down in bed. Pt tolerated sitting EOB < 3 minutes during seated grooming task requiring maxA.    Patient End of Session: In bed;Needs met;RN aware of session/findings;Call light within reach;All patient questions and concerns addressed;Hospital anti-slip socks;Alarm  set    OCCUPATIONAL PROFILE    HOME SITUATION  Type of Home: House  Home Layout: Two level  Lives With: Spouse    Toilet and Equipment: Standard height toilet  Shower/Tub and Equipment: Tub-shower combo       Occupation/Status: wife assists, with showering, dressing IND, manages toileting IND  Hand Dominance: Right  Drives: No  Patient Regularly Uses: None    Prior Level of Function: Per pt report, pt lives at home with supportive spouse. Pt is typically IND with dressing and toileting, has assistance from spouse for showering. Pt does not drive.    SUBJECTIVE   \"I wanna lie down\"    PAIN ASSESSMENT  Ratin          OBJECTIVE  Precautions: Bed/chair alarm (PEG; difficulty speaking due to head/neck ca)  Fall Risk: High fall risk    ASSESSMENTS    AM-PAC ‘6-Clicks’ Inpatient Daily Activity Short Form  -   Putting on and taking off regular lower body clothing?: A Lot  -   Bathing (including washing, rinsing, drying)?: A Lot  -   Toileting, which includes using toilet, bedpan or urinal? : A Lot  -   Putting on and taking off regular upper body clothing?: A Little  -   Taking care of personal grooming such as brushing teeth?: A Little  -   Eating meals?: A Little    AM-PAC Score:  Score: 15  Approx Degree of Impairment: 56.46%  Standardized Score (AM-PAC Scale): 34.69    ADDITIONAL TESTS     NEUROLOGICAL FINDINGS      COGNITION ASSESSMENTS     PLAN  OT Device Recommendations: TBD  OT Treatment Plan: Energy conservation/work simplification techniques;Balance activities;ADL training;IADL training;Functional transfer training;UE strengthening/ROM;Endurance training;Patient/Family education;Patient/Family training;Equipment eval/education;Compensatory technique education;Continued evaluation  Rehab Potential : Fair  Frequency: 3-5x/week  Number of Visits to Meet Established Goals: 5    ADL Goals   Patient will perform grooming: with supervision and while standing at sink  Patient will perform lower body dressing:  with  supervision  Patient will perform toileting: with supervision    Functional Transfer Goals  Patient will transfer from supine to sit:  with modified independent  Patient will transfer from sit to stand:  with modified independent  Patient will transfer to toilet:  with supervision    UE Exercise Program Goal  Patient will be supervision with bilateral AROM HEP (home exercise program).    Patient Evaluation Complexity Level:   Occupational Profile/Medical History MODERATE - Expanded review of history including review of medical or therapy record   Specific performance deficits impacting engagement in ADL/IADL MODERATE  3 - 5 performance deficits   Client Assessment/Performance Deficits MODERATE - Comorbidities and min to mod modifications of tasks    Clinical Decision Making MODERATE - Analysis of occupational profile, detailed assessments, several treatment options    Overall Complexity MODERATE     OT Session Time: 15 minutes

## 2024-11-15 NOTE — CONSULTS
[facility]   part of [facility] ID CONSULT NOTE    Wang Romero Patient Status:  Inpatient    1945 MRN [id number]   Location [facility] 8NE-A Attending Ben Ware, DO   Hosp Day # 2 PCP Colette Steele MD       Reason for Consultation:  Progressive pulmonary infiltrates    History of Present Illness:  Wang Romero is a 78 year old male with hypothyroidism, rheumatic heart disease- s/p AVR and MV repair and head/neck cancer. Patient presents with worsening fatigue and persistent cough.     He underwent a R TCAR 10/15. Per patient's wife, the procedure went well but she reports that she feel he has been declining since that time. He was noted to have hematuria 10/22 and dx with serratia UTI. He accidentally pulled his G tube out following this and was seen in the ED 10/24. He recently was hospitalized 10/27- 10/31 with pneumonia and a GIB. Sputum culture showed serratia and he was discharged home with cipro.     Per patient wife he has had a persistent cough since that time with worsening weakness. Occasional chills but no known fevers. Denies any know sick contacts. Denies any recent travel.     History:  Past Medical History:    Arrhythmia    Cancer (HCC)    sqaumos cell     Cataract    Exposure to medical diagnostic radiation    Heart disease    Heart valve disease    Valve replaced in     High cholesterol    Hypothyroidism    Prostate enlargement    Rheumatic fever    Squamous cell carcinoma     Past Surgical History:   Procedure Laterality Date    Cabg      Cardiac valve surgery      Other surgical history      sqaumos cell cancer removed    Valve replacement  2008    pig valve     History reviewed. No pertinent family history.   reports that he quit smoking about 18 years ago. His smoking use included cigarettes. He has quit using smokeless tobacco. He reports current alcohol use. He reports that he does not use  drugs.    Allergies:  Allergies[1]    Medications:    Current Facility-Administered Medications:     albuterol (Ventolin) (2.5 MG/3ML) 0.083% nebulizer solution 2.5 mg, 2.5 mg, Nebulization, Once    albuterol (Ventolin) (2.5 MG/3ML) 0.083% nebulizer solution 1.25 mg, 1.25 mg, Nebulization, Q4H WA (5 times daily)    furosemide (Lasix) 10 mg/mL injection 20 mg, 20 mg, Intravenous, Once    sodium chloride 3 % nebulizer solution 3 mL, 3 mL, Nebulization, TID    piperacillin-tazobactam (Zosyn) 3.375 g in dextrose 5% 100 mL IVPB-ADDV, 3.375 g, Intravenous, Q8H    [COMPLETED] dilTIAZem 5 mg BOLUS FROM BAG infusion, 5 mg, Intravenous, Once **AND** dilTIAZem (cardIZEM) 100 mg in sodium chloride 0.9% 100 mL IVPB-ADDV, 2.5-20 mg/hr, Intravenous, Continuous    ALPRAZolam (Xanax) tab 0.5 mg, 0.5 mg, Per G Tube, Daily PRN    levothyroxine (Synthroid) tab 50 mcg, 50 mcg, Per G Tube, Before breakfast    metoprolol tartrate (Lopressor) partial tab 12.5 mg, 12.5 mg, Per G Tube, BID    atorvastatin (Lipitor) tab 10 mg, 10 mg, Per G Tube, Nightly    ticagrelor (Brilinta) tab 90 mg, 90 mg, Per G Tube, Q12H    enoxaparin (Lovenox) 40 MG/0.4ML SUBQ injection 40 mg, 40 mg, Subcutaneous, Nightly    melatonin tab 3 mg, 3 mg, Oral, Nightly PRN    polyethylene glycol (PEG 3350) (Miralax) 17 g oral packet 17 g, 17 g, Oral, Daily PRN    sennosides (Senokot) tab 17.2 mg, 17.2 mg, Oral, Nightly PRN    bisacodyl (Dulcolax) 10 MG rectal suppository 10 mg, 10 mg, Rectal, Daily PRN    fleet enema (Fleet) rectal enema 133 mL, 1 enema, Rectal, Once PRN    ondansetron (Zofran) 4 MG/2ML injection 4 mg, 4 mg, Intravenous, Q6H PRN    metoclopramide (Reglan) 5 mg/mL injection 5 mg, 5 mg, Intravenous, Q8H PRN    benzonatate (Tessalon) cap 200 mg, 200 mg, Oral, TID PRN    guaiFENesin (Robitussin) 100 MG/5 ML oral liquid 200 mg, 200 mg, Oral, Q4H PRN    glycerin-hypromellose- (Artificial Tears) 0.2-0.2-1 % ophthalmic solution 1 drop, 1 drop, Both Eyes,  QID PRN    sodium chloride (Saline Mist) 0.65 % nasal solution 1 spray, 1 spray, Each Nare, Q3H PRN    acetaminophen (Tylenol Extra Strength) tab 500 mg, 500 mg, Per G Tube, Q4H PRN    aspirin chewable tab 81 mg, 81 mg, Per G Tube, Daily    pantoprazole (Protonix) 40 mg in sodium chloride 0.9% PF 10 mL IV push, 40 mg, Intravenous, BID AC    sodium chloride (Saline Mist) 0.65 % nasal solution 1 spray, 1 spray, Each Nare, TID    Review of Systems:  CONSTITUTIONAL:  + fatigue  HEENT:  Eyes:  No visual loss Ears, Nose, Throat:  No hearing loss  SKIN:  No rash or itching.  CARDIOVASCULAR:  No chest pain  RESPIRATORY:  + shortness of breath, + cough   GASTROINTESTINAL:  No nausea, vomiting or diarrhea. No abdominal pain  GENITOURINARY:  No Burning on urination.   NEUROLOGICAL:  No headache  MUSCULOSKELETAL:No joint pain  HEMATOLOGIC: + bleeding from nose/mouth lesions  ALLERGIES:  No history of asthma    Physical Exam:  Vital signs: Blood pressure 109/63, pulse (!) 123, temperature 97.8 °F (36.6 °C), temperature source Oral, resp. rate (!) 56, height 175.3 cm (5' 9\"), weight 134 lb (60.8 kg), SpO2 95%.    General: Alert, oriented, NAD, audible rhonchi  HEENT: Bloody drainage noted from nose. Oropharynx with dry, crusted lesions   Neck: No lymphadenopathy.  Supple.  Cardiovascular: Tachycardic  Respiratory: Bilateral rhonchi  Abdomen: Soft, nontender, nondistended. + PEG tube in place.  Musculoskeletal: No edema noted. Movement of all extremities  Integument: No lesions. No erythema.    Laboratory Data:  Recent Labs   Lab 11/15/24  0450   RBC 2.64*   HGB 7.6*   HCT 25.2*   MCV 95.5   MCH 28.8   MCHC 30.2*   RDW 15.3   NEPRELIM 6.31   WBC 7.5   .0     Recent Labs   Lab 11/13/24  1042 11/14/24  0524 11/15/24  0450   * 117* 132*   BUN 29* 29* 26*   CREATSERUM 1.05 0.90 0.88   CA 9.3 8.6* 8.9   ALB 3.7  --  3.1*    141 139   K 4.5 4.4 4.3    106 105   CO2 32.0 35.0* 32.0   ALKPHO 132*  --  113   AST  27  --  20   ALT 11  --  8*   BILT 0.9  --  0.6   TP 7.3  --  6.2       Microbiology: Reviewed in EMR    Radiology: Reviewed.    PROCEDURE:  XR CHEST AP PORTABLE  (CPT=71045)     TECHNIQUE:  AP chest radiograph was obtained.     COMPARISON:  [facility] , CT, CTA CAROTID ARTERIES (CPT=70498), 9/26/2024, 10:49 AM.  [facility] , XR, XR CHEST AP PORTABLE  (CPT=71045), 11/13/2024, 11:24 AM.     INDICATIONS:  dyspnea     PATIENT STATED HISTORY: (As transcribed by Technologist)        FINDINGS:  Cardiomediastinal silhouette is stable in size and appearance with sternotomy wires and mediastinal clips.  Persistent increasing airspace opacities bilaterally.  Blunting of the right costophrenic angle.  No pneumothorax.  Right apical  surgical clips medially, correlate clinically.     Impression:    CONCLUSION:    1. When compared to most recent chest radiograph dated 11/13/2024, there are persistent increasing airspace opacities bilaterally with small right pleural effusion.     LOCATION:  [facility]    Dictated by (CST): Erika Mcintyre MD on 11/15/2024 at 9:54 AM      Finalized by (CST): Erika Mcintyre MD on 11/15/2024 at 9:57 AM        PROCEDURE:  XR CHEST AP PORTABLE  (CPT=71045)     TECHNIQUE:  AP chest radiograph was obtained.     COMPARISON:  [facility] , XR, XR CHEST AP PORTABLE  (CPT=71045), 10/27/2024, 3:51 AM.     INDICATIONS:  WEAKNESS     PATIENT STATED HISTORY: (As transcribed by Technologist)  Patient had carotid surgery 3 weeks ago, and had pnuemonia 2 weeks ago.      FINDINGS:  Normal heart size with sternotomy change.  Increased right perihilar and right upper lobe alveolar opacities.  Decreased left perihilar mid lung opacities with increased alveolar opacity in left upper lung.  Pulmonary vessels are obscured     Impression:    CONCLUSION:  Bilateral alveolar pneumonia versus edema is increased on the right, improved in left mid lung and increased in the left upper lung.     LOCATION:  [facility]     Dictated by (CST): [facility] Young MD  on 11/13/2024 at 11:47 AM      Finalized by (CST): [facility] Young MD on 11/13/2024 at 11:49 AM    ASSESSMENT:  Bilateral pneumonia with suspected mucous plugging  Sputum cx 10/28 with serratia, recently completed course of cipro  Strep pneumo and legionella Uags negative  Acute respiratory failure, d/t above  H/o head and neck cancer with dysphagia, s/p PEG  HLD, A. Fib, h/o rheumatic heart disease s/p AVR and MV repair  Hypothyroidism   R cartoid stenosis/radiation arteritis, s/p R TCAR 10/15/24.     PLAN:  - continue IV Zosyn for now  - send sputum culture if able  - plans for VRP  - follow blood cultures  - follow temps and wbc  - follow respiratory status  - reviewed labs, micro, imaging reports, available old records    Discussed case with RN, patient and Dr. Chester.     Thank you for allowing us to participate in the care of this patient. Please do not hesitate to call if you have any questions.   We will continue to follow with you and will make further recommendations based on his progress.    [facility]  [phone]  11/15/2024         [1]   Allergies  Allergen Reactions    Compazine [Prochlorperazine] HALLUCINATION

## 2024-11-15 NOTE — PROGRESS NOTES
[facility]  Progress Note    Wang Romero Patient Status:  Inpatient    1945 MRN [id number]   Location [facility] 8NE-A Attending Ben Ware, DO   Hosp Day # 2 PCP Colette Steele MD     Subjective:  Wang Romero is a(n) 78 year old male remains afebrile  Increasing hypoxia overnight--with dyspnea--  Now on 5 L O2 with sats of 93%  Remains awake alert denies any chest pain continues to note difficulty clearing secretions unable to expectorate  Refuses flutter valve states it is hard to get a seal  States willing to try chest PT discussed with respiratory therapy had refused     Objective:  /63 (BP Location: Right arm)   Pulse (!) 123   Temp 97.8 °F (36.6 °C) (Oral)   Resp (!) 56   Ht 5' 9\" (1.753 m)   Wt 134 lb (60.8 kg)   SpO2 95%   BMI 19.79 kg/m² currently 6 L      Temp (24hrs), Av.9 °F (36.6 °C), Min:97.6 °F (36.4 °C), Max:98.3 °F (36.8 °C)      Intake/Output:    Intake/Output Summary (Last 24 hours) at 11/15/2024 1404  Last data filed at 11/15/2024 0903  Gross per 24 hour   Intake 900 ml   Output 450 ml   Net 450 ml       Physical Exam:   General: alert, cooperative, oriented.  Tachypneic and audibly wheezing congested with secretions   Head: Normocephalic, without obvious abnormality, atraumatic.   Throat: Increasing eschar/soft tissue density soft palate and forward-bloody crusting   Neck: trachea midline, no adenopathy, no thyromegaly. No JVD.   Lungs: Remains with marked central rhonchi inspiratory and expiratory rales   Chest wall: No tenderness or deformity.   Heart: Paced irregular   Abdomen: soft, non-distended, no masses, no guarding, no     Rebound.  Reports tolerating tube feedings   Extremity: +1+2 edema   Skin: No rashes or lesions.   Neurological: Alert, interactive, no focal deficits    Lab Data Review:  Recent Labs     24  1042 24  0523 11/15/24  0450   WBC 9.2 6.1 7.5   HGB 8.8* 7.2* 7.6*   .0 279.0 284.0     Recent Labs      11/13/24  1042 11/14/24  0524 11/15/24  0450    141 139   K 4.5 4.4 4.3    106 105   CO2 32.0 35.0* 32.0   BUN 29* 29* 26*   CREATSERUM 1.05 0.90 0.88     Recent Labs   Lab 11/13/24  1042   PTP 17.4*   INR 1.41*   PTT 30.1     Recent Labs   Lab 11/15/24  0307   ABGPHT 7.38   ZFGXOL4F 52*   VLONC7N 69*   ABGHCO3 28.8*   ABGBE 5.0*   TEMP 98.6   SPENCER Positive   SITE Right Radial   DEV Nasal cannula   THGB 7.9*         Cultures: Blood cultures remain negative    Radiology:  XR CHEST AP PORTABLE  (CPT=71045)    Result Date: 11/15/2024  CONCLUSION:  1. When compared to most recent chest radiograph dated 11/13/2024, there are persistent increasing airspace opacities bilaterally with small right pleural effusion.   LOCATION:  [facility]      Dictated by (CST): Erika Mcintyre MD on 11/15/2024 at 9:54 AM     Finalized by (CST): Erika Mcintyre MD on 11/15/2024 at 9:57 AM      Cxr with increaesd mid field and upper lobe infiltrates   Recent Labs   Lab 11/15/24  0307   ABGPHT 7.38   HUITMO7U 52*   HZKDO5Y 69*   ABGHCO3 28.8*   ABGBE 5.0*   TEMP 98.6   SPENCER Positive   SITE Right Radial   DEV Nasal cannula   THGB 7.9*           Medications reviewed     Assessment and Plan:   Patient Active Problem List   Diagnosis    Dupuytren's contracture of both hands    Dupuytren's contracture of left hand    Community acquired pneumonia of left lung    Community acquired pneumonia of left lung, unspecified part of lung    Hemoptysis    Anticoagulated    Chronic atrial fibrillation (HCC)    Pure hypercholesterolemia    Acquired hypothyroidism    Syncope, near    Gastroenteritis    Elevated troponin    Community acquired pneumonia of left lower lobe of lung    Diarrhea    Vomiting    History of aortic valve replacement with bioprosthetic valve    Lytic bone lesions on xray    Elevated PSA    Subarachnoid hemorrhage (HCC)    Atrial fibrillation and flutter (HCC)    Subarachnoid hemorrhage following injury, no loss of consciousness (HCC)     Closed fracture of orbit (HCC)    Fall    Urinary tract infection without hematuria    Hypoxemia    Acute respiratory failure with hypoxia (HCC)    Community acquired pneumonia, unspecified laterality    Aspiration pneumonia (HCC)    Malnutrition (HCC)    Septic shock (HCC)    Gram-negative pneumonia (HCC)    Dysphagia    Stenosis of right carotid artery    Anemia due to acute blood loss    Carotid artery disease (HCC)    Gastrointestinal hemorrhage    Melena    Status post carotid endarterectomy    Hyponatremia    GI bleed    Atrial fibrillation with rapid ventricular response (HCC)    Acute on chronic congestive heart failure, unspecified heart failure type (HCC)    Sepsis due to pneumonia (HCC)    Pulmonary infiltrates    Bronchospasm       Assessment:  Hypoxic and hypercapnic respiratory failure  Bilateral pulmonary infiltrates most compatible with pneumonia-sputum to be obtained as able  Bronchospasm/great difficulty clearing secretions  Oral eschar/crusting-ENT input appreciated  Peripheral vascular disease thought radiation related status post TCAR 10/15/2022   history of rheumatic heart disease status post AVR and MV repair in 2008  A-fib with RVR  History of head neck cancer status post RT and surgical resection 2011  Chronic dysphagia with longstanding G-tube  Ongoing anemia    Plan:  Now with increasing hypoxia-and evidence of progressive pulmonary infiltrates  Will review with patient's wife when available-bronchoscopy would be helpful though patient would be a great risk for respiratory failure  ID to weigh in  Continue efforts at aggressive bronchopulmonary toilet-he refuses some aspects    CC     Brianna Chester MD  11/15/2024  2:04 PM

## 2024-11-16 NOTE — PLAN OF CARE
Assumed patient at 1930. Alert and oriented x 4-3 forgetful at times on 6L high flow nasal canula. Lung sounds with ronchi. Afib on tele. Continent of bladder, incontinent of bowel. Total care. Patient updated on plan of care and verbalized understanding.     POC: IV antibiotics, nasal spray, HH face mask    0250 - patients HR sustaining in 130's - 140's MD notified.     0250 - increased work of breathing Rt suctioned.     Problem: Patient/Family Goals  Goal: Patient/Family Long Term Goal  Description: Patient's Long Term Goal: stay healthy    Interventions:  - continue medication regimine  - follow up with physicians  - See additional Care Plan goals for specific interventions  Outcome: Progressing  Goal: Patient/Family Short Term Goal  Description: Patient's Short Term Goal: go home    Interventions:   - IV abx  - cardiology consult  - ent consult  - telemetry   - See additional Care Plan goals for specific interventions  Outcome: Progressing     Problem: CARDIOVASCULAR - ADULT  Goal: Maintains optimal cardiac output and hemodynamic stability  Description: INTERVENTIONS:  - Monitor vital signs, rhythm, and trends  - Monitor for bleeding, hypotension and signs of decreased cardiac output  - Evaluate effectiveness of vasoactive medications to optimize hemodynamic stability  - Monitor arterial and/or venous puncture sites for bleeding and/or hematoma  - Assess quality of pulses, skin color and temperature  - Assess for signs of decreased coronary artery perfusion - ex. Angina  - Evaluate fluid balance, assess for edema, trend weights  Outcome: Progressing  Goal: Absence of cardiac arrhythmias or at baseline  Description: INTERVENTIONS:  - Continuous cardiac monitoring, monitor vital signs, obtain 12 lead EKG if indicated  - Evaluate effectiveness of antiarrhythmic and heart rate control medications as ordered  - Initiate emergency measures for life threatening arrhythmias  - Monitor electrolytes and administer  replacement therapy as ordered  Outcome: Progressing     Problem: RESPIRATORY - ADULT  Goal: Achieves optimal ventilation and oxygenation  Description: INTERVENTIONS:  - Assess for changes in respiratory status  - Assess for changes in mentation and behavior  - Position to facilitate oxygenation and minimize respiratory effort  - Oxygen supplementation based on oxygen saturation or ABGs  - Provide Smoking Cessation handout, if applicable  - Encourage broncho-pulmonary hygiene including cough, deep breathe, Incentive Spirometry  - Assess the need for suctioning and perform as needed  - Assess and instruct to report SOB or any respiratory difficulty  - Respiratory Therapy support as indicated  - Manage/alleviate anxiety  - Monitor for signs/symptoms of CO2 retention  Outcome: Progressing

## 2024-11-16 NOTE — DISCHARGE SUMMARY
[facility]     Wang Romero Patient Status:  Inpatient    1945 MRN [id number]   Location [facility] 8NE-A Attending Ben Ware, DO   Hosp Day # 3 PCP Colette Steele MD     Date of Admission: 2024  Date of Discharge: 2024  Discharge Disposition:  2024 at 1745    Discharge Diagnosis:   #Acute hypoxic respiratory failure due to PNA and inability to expectorate secretions  #Bilateral PNA  #Upper airway secretions  #Chronic atrial fibrillation with intermittent RVR  #HLD  #Hypothyroidism  #Hx of RHD s/p bio AVR and MV repair   #S/p right TCAR on 10/15/2024  #H/o head and neck cancer with dysphagia s/p PEG  #Scabbing/crusting in posterior pharynx  #Xerostomia  #Deconditioning    History of Present Illness: Wang Romero is a 78 year old male with PMHx head and neck cancer s/p chemoradiation, dysphagia s/p PEG tube, hypothyroidism, chronic atrial fibrillation, rheumatoid heart disease s/p bio AVR and MVR in , BABS s/p stent in 2024, chronic anemia who presents to the hospital with cough and generalized weakness. He was discharged on 10/31 when admitted with GIB but he declined endoscopy. Hgb stabilized and bleeding resolved on its own. Eliquis has been on hold. He also had PNA and was discharged on ciprofloxacin which he completed . Since discharge, he has been weak. He is coughing and is SOB. No fever or chills. No nausea or vomiting. He has chronic diarrhea, no change in this.  No dysuria. In the ER, he was in atrial fibrillation with HR up to 120s. Lactic acid 2.1. SBP was in the 80s and he received 800 mL IVF instead of sepsis bolus due to concern for fluid overload. CXR with edema vs bilateral PNA. proBNP is 4391, lower than last month. He was placed on 2L oxygen.     Brief Synopsis: Patient was started on antibiotics with Unasyn for pneumonia.  He had elevated proBNP and atrial fibrillation with intermittent RVR.   Cardiology consulted but he did not appear clinically fluid overloaded.  EF was preserved and there were no wall motion abnormalities.  He did have scabbing/crusting in the posterior pharynx along with xerostomia.  ENT consulted and they did bedside endoscopy which showed no airway obstruction or obvious evidence of cancer recurrence.  There was thick mucus in the pharynx.  He was started on bronchopulmonary toilet due to significant secretions.  ENT also recommended humidified mask but he was not able to tolerate this.  He did not tolerate flutter valve either.  He was continued on albuterol nebs and hypertonic saline along with chest PT.  Repeat chest x-ray shows worsening infiltrates and ID was consulted.  Antibiotics were adjusted to Zosyn.  Patient had just finished his bolus tube feeds and he was getting flushed when he suddenly became bradycardic and less responsive.  RRT was called but he passed away.  Patient is a DO NOT RESUSCITATE.  Wife updated and arrived to the hospital.    Lace+ Score: 82  59-90 High Risk  29-58 Medium Risk  0-28   Low Risk       TCM Follow-Up Recommendation:  LACE > 58: High Risk of readmission after discharge from the hospital.    Procedures during hospitalization:   Bedside flexible laryngoscopy    Incidental or significant findings and recommendations (brief descriptions):  None    Lab/Test results pending at Discharge:   None    Consultants:  Cardiology  Pulmonology  ENT  ID  Vascular surgery      Ben Ware DO    Time spent:  15 minutes

## 2024-11-16 NOTE — PROGRESS NOTES
[facility]   part of [facility] ID PROGRESS NOTE    Wang Romero Patient Status:  Inpatient    1945 MRN [id number]   Location [facility] 8NE-A Attending Ben Ware,    Hosp Day # 3 PCP Colette Steele MD     Abx: IV Zosyn D#2, s/p IV Unasyn    Subjective: Patient seen and examined today. Awake, sitting up in a chair. Reports some SOB. Afebrile. On 5L NC.     Allergies:  Allergies[1]    Medications:    Current Facility-Administered Medications:     metoprolol tartrate (Lopressor) tab 25 mg, 25 mg, Per G Tube, BID    metoprolol (Lopressor) 5 mg/5mL injection 5 mg, 5 mg, Intravenous, Q4H PRN    ceFEPIme (Maxpime) 2 g in sodium chloride 0.9% 100 mL IVPB-MBP, 2 g, Intravenous, Q12H    metRONIDAZOLE (Flagyl) tab 500 mg, 500 mg, Per G Tube, 2 times per day    albuterol (Ventolin) (2.5 MG/3ML) 0.083% nebulizer solution 1.25 mg, 1.25 mg, Nebulization, Q4H WA (5 times daily)    sodium chloride 3 % nebulizer solution 3 mL, 3 mL, Nebulization, TID    [COMPLETED] dilTIAZem 5 mg BOLUS FROM BAG infusion, 5 mg, Intravenous, Once **AND** dilTIAZem (cardIZEM) 100 mg in sodium chloride 0.9% 100 mL IVPB-ADDV, 2.5-20 mg/hr, Intravenous, Continuous    ALPRAZolam (Xanax) tab 0.5 mg, 0.5 mg, Per G Tube, Daily PRN    levothyroxine (Synthroid) tab 50 mcg, 50 mcg, Per G Tube, Before breakfast    atorvastatin (Lipitor) tab 10 mg, 10 mg, Per G Tube, Nightly    ticagrelor (Brilinta) tab 90 mg, 90 mg, Per G Tube, Q12H    enoxaparin (Lovenox) 40 MG/0.4ML SUBQ injection 40 mg, 40 mg, Subcutaneous, Nightly    melatonin tab 3 mg, 3 mg, Oral, Nightly PRN    polyethylene glycol (PEG 3350) (Miralax) 17 g oral packet 17 g, 17 g, Oral, Daily PRN    sennosides (Senokot) tab 17.2 mg, 17.2 mg, Oral, Nightly PRN    bisacodyl (Dulcolax) 10 MG rectal suppository 10 mg, 10 mg, Rectal, Daily PRN    fleet enema (Fleet) rectal enema 133 mL, 1 enema, Rectal, Once PRN    ondansetron (Zofran) 4 MG/2ML injection 4 mg, 4 mg,  Intravenous, Q6H PRN    metoclopramide (Reglan) 5 mg/mL injection 5 mg, 5 mg, Intravenous, Q8H PRN    benzonatate (Tessalon) cap 200 mg, 200 mg, Oral, TID PRN    guaiFENesin (Robitussin) 100 MG/5 ML oral liquid 200 mg, 200 mg, Oral, Q4H PRN    glycerin-hypromellose- (Artificial Tears) 0.2-0.2-1 % ophthalmic solution 1 drop, 1 drop, Both Eyes, QID PRN    sodium chloride (Saline Mist) 0.65 % nasal solution 1 spray, 1 spray, Each Nare, Q3H PRN    acetaminophen (Tylenol Extra Strength) tab 500 mg, 500 mg, Per G Tube, Q4H PRN    aspirin chewable tab 81 mg, 81 mg, Per G Tube, Daily    pantoprazole (Protonix) 40 mg in sodium chloride 0.9% PF 10 mL IV push, 40 mg, Intravenous, BID AC    sodium chloride (Saline Mist) 0.65 % nasal solution 1 spray, 1 spray, Each Nare, TID    Review of Systems:  Completed. See pertinent positives and negatives.    Physical Exam:  Vital signs: Blood pressure 97/65, pulse 110, temperature 98.2 °F (36.8 °C), temperature source Oral, resp. rate (!) 46, height 175.3 cm (5' 9\"), weight 134 lb (60.8 kg), SpO2 94%.    General: Alert, oriented, NAD, audible rhonchi. Sitting up in a chair. On NC.   HEENT: Bloody drainage noted from nose. Oropharynx with bloody/crusted lesions   Neck: No lymphadenopathy.  Supple.  Cardiovascular: Tachycardic  Respiratory: Bilateral rhonchi  Abdomen: Soft, nontender, nondistended. + PEG tube in place.  Musculoskeletal: No edema noted. Movement of all extremities  Integument: No lesions. No erythema.    Laboratory Data:  Recent Labs   Lab 11/16/24  0512   RBC 2.70*   HGB 7.9*   HCT 27.3*   .1*   MCH 29.3   MCHC 28.9*   RDW 15.8   NEPRELIM 4.90   WBC 6.2   .0     Recent Labs   Lab 11/13/24  1042 11/14/24  0524 11/15/24  0450 11/16/24  0512   * 117* 132* 113*   BUN 29* 29* 26* 18   CREATSERUM 1.05 0.90 0.88 0.91   CA 9.3 8.6* 8.9 8.8   ALB 3.7  --  3.1* 2.9*    141 139 138   K 4.5 4.4 4.3 4.3    106 105 105   CO2 32.0 35.0* 32.0 28.0    ALKPHO 132*  --  113 103   AST 27  --  20 23   ALT 11  --  8* <7*   BILT 0.9  --  0.6 0.5   TP 7.3  --  6.2 6.4       Microbiology: Reviewed in EMR    Radiology: Reviewed.    PROCEDURE:  XR CHEST AP PORTABLE  (CPT=71045)     TECHNIQUE:  AP chest radiograph was obtained.     COMPARISON:  [facility] , CT, CTA CAROTID ARTERIES (CPT=70498), 9/26/2024, 10:49 AM.  [facility] , XR, XR CHEST AP PORTABLE  (CPT=71045), 11/13/2024, 11:24 AM.     INDICATIONS:  dyspnea     PATIENT STATED HISTORY: (As transcribed by Technologist)        FINDINGS:  Cardiomediastinal silhouette is stable in size and appearance with sternotomy wires and mediastinal clips.  Persistent increasing airspace opacities bilaterally.  Blunting of the right costophrenic angle.  No pneumothorax.  Right apical  surgical clips medially, correlate clinically.     Impression:    CONCLUSION:    1. When compared to most recent chest radiograph dated 11/13/2024, there are persistent increasing airspace opacities bilaterally with small right pleural effusion.     LOCATION:  [facility]    Dictated by (CST): Erika Mcintyre MD on 11/15/2024 at 9:54 AM      Finalized by (CST): Erika Mcintyre MD on 11/15/2024 at 9:57 AM        PROCEDURE:  XR CHEST AP PORTABLE  (CPT=71045)     TECHNIQUE:  AP chest radiograph was obtained.     COMPARISON:  [facility] , XR, XR CHEST AP PORTABLE  (CPT=71045), 10/27/2024, 3:51 AM.     INDICATIONS:  WEAKNESS     PATIENT STATED HISTORY: (As transcribed by Technologist)  Patient had carotid surgery 3 weeks ago, and had pnuemonia 2 weeks ago.      FINDINGS:  Normal heart size with sternotomy change.  Increased right perihilar and right upper lobe alveolar opacities.  Decreased left perihilar mid lung opacities with increased alveolar opacity in left upper lung.  Pulmonary vessels are obscured     Impression:    CONCLUSION:  Bilateral alveolar pneumonia versus edema is increased on the right, improved in left mid lung and increased in the left upper lung.     LOCATION:   [facility]     Dictated by (CST): [facility] Young MD on 11/13/2024 at 11:47 AM      Finalized by (CST): [facility] Young MD on 11/13/2024 at 11:49 AM    ASSESSMENT:  Bilateral pneumonia  Sputum cx 10/28 with serratia, recently completed course of cipro  Strep pneumo and legionella Uags negative. VRP negative.  Acute respiratory failure, d/t above  H/o head and neck cancer with dysphagia, s/p PEG. No evidence of recurrence on endoscopy per ENT  HLD, A. Fib, h/o rheumatic heart disease s/p AVR and MV repair  Hypothyroidism   R cartoid stenosis/radiation arteritis, s/p R TCAR 10/15/24.     PLAN:  - will dc IV Zosyn and start IV cefepime/flagyl for now based on prior sputum culture. Await new sputum culture  - follow blood cultures- ngtd  - fungal sputum culture pending  - follow temps and wbc  - follow respiratory status  - patient unable to clear secretions; continue aggressive bronchopulmonary toilet  - ENT and pulm following    Discussed case with RN, patient and Dr. Banuelos.    DARIEN Odell   [facility]  [phone]         [1]   Allergies  Allergen Reactions    Compazine [Prochlorperazine] HALLUCINATION

## 2024-11-16 NOTE — PLAN OF CARE
Received patient at 0730. Alert and oriented x3. Tele rhythm afib. O2 sat 94% on 5L high flow oxygen. Rhonchi lung sounds heard. Bed is locked and in low position. Call light and personal items within patient reach. Urinal at bedside. No c/o pain. Gtube in LLQ.Gets bolus feedings with Nestle Boost. Skin is dry and intact with redness on bottom. Mepilex in place for protection. Reviewed plan of care and patient verbalized understanding.       Problem: Patient/Family Goals  Goal: Patient/Family Long Term Goal  Description: Patient's Long Term Goal: stay healthy    Interventions:  - continue medication regimine  - follow up with physicians  - See additional Care Plan goals for specific interventions  Outcome: Progressing  Goal: Patient/Family Short Term Goal  Description: Patient's Short Term Goal: go home    Interventions:   - IV abx  - cardiology consult  - ent consult  - telemetry   - See additional Care Plan goals for specific interventions  Outcome: Progressing     Problem: CARDIOVASCULAR - ADULT  Goal: Maintains optimal cardiac output and hemodynamic stability  Description: INTERVENTIONS:  - Monitor vital signs, rhythm, and trends  - Monitor for bleeding, hypotension and signs of decreased cardiac output  - Evaluate effectiveness of vasoactive medications to optimize hemodynamic stability  - Monitor arterial and/or venous puncture sites for bleeding and/or hematoma  - Assess quality of pulses, skin color and temperature  - Assess for signs of decreased coronary artery perfusion - ex. Angina  - Evaluate fluid balance, assess for edema, trend weights  Outcome: Progressing  Goal: Absence of cardiac arrhythmias or at baseline  Description: INTERVENTIONS:  - Continuous cardiac monitoring, monitor vital signs, obtain 12 lead EKG if indicated  - Evaluate effectiveness of antiarrhythmic and heart rate control medications as ordered  - Initiate emergency measures for life threatening arrhythmias  - Monitor electrolytes  and administer replacement therapy as ordered  Outcome: Progressing     Problem: RESPIRATORY - ADULT  Goal: Achieves optimal ventilation and oxygenation  Description: INTERVENTIONS:  - Assess for changes in respiratory status  - Assess for changes in mentation and behavior  - Position to facilitate oxygenation and minimize respiratory effort  - Oxygen supplementation based on oxygen saturation or ABGs  - Provide Smoking Cessation handout, if applicable  - Encourage broncho-pulmonary hygiene including cough, deep breathe, Incentive Spirometry  - Assess the need for suctioning and perform as needed  - Assess and instruct to report SOB or any respiratory difficulty  - Respiratory Therapy support as indicated  - Manage/alleviate anxiety  - Monitor for signs/symptoms of CO2 retention  Outcome: Progressing     Problem: Impaired Functional Mobility  Goal: Achieve highest/safest level of mobility/gait  Description: Interventions:  - Assess patient's functional ability and stability  - Promote increasing activity/tolerance for mobility and gait  - Educate and engage patient/family in tolerated activity level and precautions  - Recommend use of total lift for transfers  Outcome: Progressing     Problem: Impaired Swallowing  Goal: Minimize aspiration risk  Description: Interventions:  - Patient should be alert and upright for all feedings (90 degrees preferred)  - Offer food and liquids at a slow rate  - No straws  - Encourage small bites of food and small sips of liquid  - Offer pills one at a time, crush or deliver with applesauce as needed  - Discontinue feeding and notify MD (or speech pathologist) if coughing or persistent throat clearing or wet/gurgly vocal quality is noted  Outcome: Progressing

## 2024-11-16 NOTE — PROGRESS NOTES
Notified by RN pt in afib with HR sustaining 130s-140s. Lopressor 2.5 mg IVP ordered.     Addendum 0450: Per RN, HR improved to the 110s after IV Lopressor. HR back into the 130s-140s. Lopressor 5 mg IVP ordered.

## 2024-11-16 NOTE — PROGRESS NOTES
[facility]   part of [facility]     Hospitalist Progress Note     Wang Romero Patient Status:  Inpatient    1945 MRN [id number]   Location [facility] 8NE-A Attending Ben Ware,    Hosp Day # 3 PCP Colette Steele MD     Chief Complaint: Generalized weakness    Subjective:     Patient on 6L HFNC. HR up to 130-140s overnight and was given 2 doses of IV lopressor. No pain but still with difficulty expectorating secretions. States no SOB today.    Objective:    Review of Systems:   A comprehensive review of systems was completed; pertinent positive and negatives stated in subjective.    Vital signs:  Temp:  [97.6 °F (36.4 °C)-98.5 °F (36.9 °C)] 97.6 °F (36.4 °C)  Pulse:  [] 121  Resp:  [28-56] 28  BP: ()/(63-84) 103/65  SpO2:  [81 %-100 %] 100 %    Physical Exam:    General: No acute distress, Alert, Cachectic   HEENT: Posterior pharynx with extensive scabbing/crusting  Respiratory: Rhonchi bilaterally with audible upper airway secretions, no wheezes  Cardiovascular: S1, S2. IRIR  Abdomen: Soft, Non-tender, non-distended, positive bowel sounds  Extremities: No edema    Diagnostic Data:    Labs:  Recent Labs   Lab 24  1042 11/14/24  0523 11/15/24  0450 11/16/24  0512   WBC 9.2 6.1 7.5 6.2   HGB 8.8* 7.2* 7.6* 7.9*   MCV 93.4 94.8 95.5 101.1*   .0 279.0 284.0 272.0   INR 1.41*  --   --   --      Recent Labs   Lab 24  1042 11/14/24  0524 11/15/24  0450 24  05   * 117* 132* 113*   BUN 29* 29* 26* 18   CREATSERUM 1.05 0.90 0.88 0.91   CA 9.3 8.6* 8.9 8.8   ALB 3.7  --  3.1* 2.9*    141 139 138   K 4.5 4.4 4.3 4.3    106 105 105   CO2 32.0 35.0* 32.0 28.0   ALKPHO 132*  --  113 103   AST 27  --  20 23   ALT 11  --  8* <7*   BILT 0.9  --  0.6 0.5   TP 7.3  --  6.2 6.4     Estimated Creatinine Clearance: 54.7 mL/min (based on SCr of 0.91 mg/dL).    Recent Labs   Lab 24  1042   TROPHS 31     Recent Labs   Lab 24  1042   PTP  17.4*   INR 1.41*      Microbiology  Hospital Encounter on 11/13/24   1. Blood Culture     Status: None (Preliminary result)    Collection Time: 11/13/24 11:46 AM    Specimen: Blood,peripheral   Result Value Ref Range    Blood Culture Result No Growth 2 Days N/A     Imaging: Reviewed in Epic.    Medications:    metoprolol tartrate  25 mg Per G Tube BID    albuterol  1.25 mg Nebulization Q4H WA (5 times daily)    sodium chloride  3 mL Nebulization TID    piperacillin-tazobactam  3.375 g Intravenous Q8H    levothyroxine  50 mcg Per G Tube Before breakfast    atorvastatin  10 mg Per G Tube Nightly    ticagrelor  90 mg Per G Tube Q12H    enoxaparin  40 mg Subcutaneous Nightly    aspirin  81 mg Per G Tube Daily    pantoprazole  40 mg Intravenous BID AC    sodium chloride  1 spray Each Nare TID       Assessment & Plan:      #Acute hypoxic respiratory failure due to PNA and inability to expectorate secretions  -Abx for PNA  -Bronchopulmonary toilet  -Wean O2 as tolerated  -Encourage IS  -Pulm following     #Bilateral PNA  #Upper airway secretions  -PCT 0.23  -Sputum culture pending  -RVP negative. Strep/legionella urine antigens negative  -Follow blood cultures, NGTD  -Had completed cipro 11/9. Started on empiric abx again with Unasyn. Completed Azithromycin last admission. Abx now adjusted to Zosyn  -Hypertonic saline nebs, albuterol nebs, chest PT, flutter valve     #Elevated proBNP  -Echo pEF, no WMA  -Clinically does not appear to be volume overloaded     #Chronic atrial fibrillation with intermittent RVR  -DOAC has been on hold since last admission  -Weaned off diltiazem gtt   -Increase home metoprolol. PRN lopressor for HR > 120  -Telemetry  -Cardiology following     #HLD  -Statin     #Hypothyroidism  -Levothyroxine     #Hx of RHD s/p bio AVR and MV repair 2008  -Repeat echo reviewed. Elevated bioprosthetic valve gradient likely due to hyperdynamic state      #S/p right TCAR on 10/15/2024  -ASA, Brilinta,  statin  -Dr. Jones following     #H/o head and neck cancer with dysphagia s/p PEG  -Tube feeds     #Scabbing/crusting in posterior pharynx  #Xerostomia  -ENT eval appreciated. Endoscopy without airway obstruction or evidence of cancer recurrence. There was thick mucus in pharynx. Management of secretions as above  -Encouraged him to not pick at the site. Refusing humidified mask  -F/u with his ENT Dr. Bush ([place]) on discharge    #Deconditioning  -PT/OT recommending SERGIO Ware DO    Supplementary Documentation:     Quality:  DVT Mechanical Prophylaxis:   SCDs,    DVT Pharmacologic Prophylaxis   Medication    enoxaparin (Lovenox) 40 MG/0.4ML SUBQ injection 40 mg      DVT Pharmacologic prophylaxis: Aspirin 162 mg       Code Status: DNAR/Comfort Care  Gaitan: No urinary catheter in place  PRISCILA: TBD    Discharge is dependent on: Clinical state, consultant recs  At this point Mr. Romero is expected to be discharge to: SERGIO    The 21st Century Cures Act makes medical notes like these available to patients in the interest of transparency. Please be advised this is a medical document. Medical documents are intended to carry relevant information, facts as evident, and the clinical opinion of the practitioner. The medical note is intended as peer to peer communication and may appear blunt or direct. It is written in medical language and may contain abbreviations or verbiage that are unfamiliar.     Dietitian Malnutrition Assessment    Evaluation for Malnutrition: Criteria for severe malnutrition diagnosis- chronic illness related to   Body fat severe depletion., Muscle mass severe depletion.               RD Malnutrition Care Plan: See RD nutrition assessment for additional recommendations.    Body Fat/Muscle Mass:          Physician Assessment     Patient has a diagnosis of severe malnutrition

## 2024-11-16 NOTE — PROGRESS NOTES
[facility]  Progress Note    Wang Romero Patient Status:  Inpatient    1945 MRN [id number]   Location [facility] 8NE-A Attending Ben Ware, DO   Hosp Day # 3 PCP Colette Steele MD     Subjective:  Wang Romero is a(n) 79 year old male remains afebrile  670 cc diuresis yesterday  Overall seems to feel a bit better  Currently up to the chair-decreasing FiO2  Objective:  BP 97/65 (BP Location: Right arm)   Pulse 110   Temp 98.2 °F (36.8 °C) (Oral)   Resp (!) 46   Ht 5' 9\" (1.753 m)   Wt 134 lb (60.8 kg)   SpO2 94%   BMI 19.79 kg/m² currently 4 L      Temp (24hrs), Av.2 °F (36.8 °C), Min:97.6 °F (36.4 °C), Max:98.5 °F (36.9 °C)      Intake/Output:    Intake/Output Summary (Last 24 hours) at 2024 1205  Last data filed at 2024 1150  Gross per 24 hour   Intake 400 ml   Output 1440 ml   Net -1040 ml       Physical Exam:   General: alert, cooperative, oriented.  Breathing seems a bit easier-remains with marked central rhonchi and upper airway congestion   Head: Normocephalic, without obvious abnormality, atraumatic.   Throat: Crusting/eschar seems improved with continuing low-grade ooze   Neck: trachea midline, no adenopathy, no thyromegaly. No JVD.  At 90 degrees   Lungs: Remains with bilateral rhonchi though overall better air entry rales   Chest wall: No tenderness or deformity.   Heart: Regular rate and rhythm   Abdomen: soft, non-distended, no masses, no guarding, no     Rebound.  Tolerating tube feedings   Extremity: Remains with bilateral left greater than right edema   Skin: No rashes or lesions.   Neurological: Alert, interactive, no focal deficits    Lab Data Review:  Recent Labs     24  0523 11/15/24  0450 24  0512   WBC 6.1 7.5 6.2   HGB 7.2* 7.6* 7.9*   .0 284.0 272.0     Recent Labs     24  0524 11/15/24  0450 24  0512    139 138   K 4.4 4.3 4.3    105 105   CO2 35.0* 32.0 28.0   BUN 29* 26* 18   CREATSERUM  0.90 0.88 0.91     Recent Labs   Lab 11/13/24  1042   PTP 17.4*   INR 1.41*   PTT 30.1       Cultures: Cultures remain negative/pending  Sputum sent yesterday thus far without growth-+1 WBCs  Fungal smears and cultures pending  Expanded viral panel negative      Radiology:  XR CHEST AP PORTABLE  (CPT=71045)    Result Date: 11/16/2024  CONCLUSION:  No significant change since prior examination.   LOCATION:  [facility]      Dictated by (CST): Donna Cheney MD on 11/16/2024 at 6:05 AM     Finalized by (CST): Donna Cheney MD on 11/16/2024 at 6:05 AM      Bilateral alveolar predominant pulmonary infiltrates--similar to yesterday's-remains progressed from 11/13 no significant pleural effusions suspected      Medications reviewed     Assessment and Plan:   Patient Active Problem List   Diagnosis    Dupuytren's contracture of both hands    Dupuytren's contracture of left hand    Community acquired pneumonia of left lung    Community acquired pneumonia of left lung, unspecified part of lung    Hemoptysis    Anticoagulated    Chronic atrial fibrillation ([facility])    Pure hypercholesterolemia    Acquired hypothyroidism    Syncope, near    Gastroenteritis    Elevated troponin    Community acquired pneumonia of left lower lobe of lung    Diarrhea    Vomiting    History of aortic valve replacement with bioprosthetic valve    Lytic bone lesions on xray    Elevated PSA    Subarachnoid hemorrhage (HCC)    Atrial fibrillation and flutter ([facility])    Subarachnoid hemorrhage following injury, no loss of consciousness ([facility])    Closed fracture of orbit ([facility])    Fall    Urinary tract infection without hematuria    Hypoxemia    Acute hypoxic on chronic hypercapnic respiratory failure (HCC)    Community acquired pneumonia, unspecified laterality    Aspiration pneumonia ([facility])    Malnutrition ([facility])    Septic shock ([facility])    Gram-negative pneumonia ([facility])    Dysphagia    Stenosis of right carotid artery    Anemia due to acute blood loss    Carotid  artery disease (HCC)    Gastrointestinal hemorrhage    Melena    Status post carotid endarterectomy    Hyponatremia    GI bleed    Atrial fibrillation with rapid ventricular response (HCC)    Acute on chronic congestive heart failure, unspecified heart failure type (HCC)    Sepsis due to pneumonia (HCC)    Pulmonary infiltrates    Bronchospasm       Assessment:  Hypoxic and hypercapnic respiratory failure seems to have stabilized  Bilateral pulmonary infiltrates most compatible with pneumonia-awaiting sputum results-pulmonary edema seems less likely  Bronchospasm/great difficulty clearing secretions  Oral eschar/crusting-ENT input appreciated  Peripheral vascular disease thought radiation related status post TCAR 10/15/2022   history of rheumatic heart disease status post AVR and MV repair in 2008  A-fib with RVR ongoing medication adjust--as needed Lopressor  History of head neck cancer status post RT and surgical resection 2011  Chronic dysphagia with longstanding G-tube  Ongoing anemia remains stable    Plan:  Continuing efforts at clearing upper airway secretions including chest PT nebulized bronchodilators and careful suctioning.-States he is unable to do the flutter valve  ID note appreciated and antibiotics adjusted-cefepime and Flagyl  Focus on nutrition continue with G-tube feeds    CC     Brianna Chester MD  11/16/2024  12:05 PM

## 2024-11-16 NOTE — CM/SW NOTE
PT eval reviewed for anticipated therapy need for DC. Pt's current anticipated therapy need is for Gradual rehab. Message sent to [facility] for review.     Referrals sent for potential SERGIO via [facility]. PASRR screen completed and attached to referral.    CM/SW will follow up w/ pt/family to further discuss and provide SERGIO choice list.     EARNEST Byers, CMSRN    [id number]

## 2024-11-17 NOTE — SPIRITUAL CARE NOTE
Spiritual Care Visit Note    Patient Name: Wang Romero Date of Spiritual Care Visit: 24   : 1945 Primary Dx: Atrial fibrillation with rapid ventricular response (HCC)       Referred By: Referral From:     Spiritual Care Taxonomy:    Intended Effects: Journeying with someone in the grief process    Methods: Collaborate with care team member;Encourage story-telling;Offer spiritual/Rastafari support;Offer support;Offer emotional support;Exploring hope    Interventions: Acknowledge response to difficult experience;Active listening;Ask guided questions;Ask guided questions about lawrence;Identify supportive relationship(s);[place];Provide compassionate touch;Provide hospitality    Visit Type/Summary:     - Spiritual Care: Offered empathic listening and emotional support. Patient and family expressed appreciation for  visit. Provided support for Patient's spiritual/Rastafari requests. Offered prayer.    Spiritual Care support can be requested via an Epic consult. For urgent/immediate needs, please contact the On Call  at: [facility]: ext [phone]    Juaquin Woods MDiv

## 2024-11-17 NOTE — SPIRITUAL CARE NOTE
Spiritual Care Visit Note    Patient Name: Wang Romero Date of Spiritual Care Visit: 24   : 1945 Primary Dx: Atrial fibrillation with rapid ventricular response (HCC)       Referred By: Referral From: Nurse    Spiritual Care Taxonomy:    Intended Effects: Journeying with someone in the grief process    Methods: Collaborate with care team member;Offer emotional support;Offer spiritual/Gnosticist support;Encourage self reflection;Encourage story-telling;Encourage sharing of feelings    Interventions: Acknowledge response to difficult experience;Active listening;[place];Provide grief resources;Provide compassionate touch;Facilitate grief recovery groups;Facilitate life review;Identify supportive relationship(s)    Visit Type/Summary:     - Spiritual Care: Responded to a request via the on call phone Consulted with RN prior to visit. Offered empathic listening and emotional support. Provided resources related to grief and grieving. Provided information regarding grief support groups. Patient and family expressed appreciation for  visit. Provided support for Patient's spiritual/Gnosticist requests. Offered prayer.  remains available for follow up.    Spiritual Care support can be requested via an Epic consult. For urgent/immediate needs, please contact the On Call  at: [facility]: ext [phone]    Rev. Guadaulpe Plata MA  Chaplain Resident

## 2024-11-17 NOTE — PLAN OF CARE
[facility]   part of [facility]    Death/Expiration note    Wang Romero Patient Status:  Inpatient    1945 MRN [id number]   Location [facility] 8NE-A Attending Ben Ware,    Hosp Day # 3 PCP Colette Steele MD         Date and Time of Death:  2024, 1745    Preliminary Cause of Death:  Acute hypoxic respiratory failure    Primary Medical Condition/Diagnosis:  Present on Admission:   Acute hypoxic on chronic hypercapnic respiratory failure (HCC)      Family notified:   spouse and daughter  Yes    Dr Ware, Dr Jones, [facility], Dr Chester and ID notified

## 2024-11-18 ENCOUNTER — TELEPHONE (OUTPATIENT)
Dept: INTERNAL MEDICINE | Age: 79
End: 2024-11-18

## 2024-11-18 LAB
M PNEUMO IGG ABS: 1995 U/ML
M PNEUMO IGM ABS: <770 U/ML

## 2024-11-18 NOTE — CDS QUERY
Dear Dr. Ware:  Can you please clarify the documented diagnosis of Sepsis:  [  x ] Sepsis confirmed   [   ] Sepsis ruled out  [   ] Other:    __________________________________________________________________________________  Documentation from the Medical Record:  Potential Risk:  Pneumonia  Clinical Indicators:   ___11/13 78M to ER with weakness and fatigue, T 98, , RR 41, BP{ 80/60, O2 87% 2L. Lactic 2.1 (0.9 after fluid bolus in ER) proBNP 4391 Covid/Flu swab (-) WBC 9.2 Cr 1.05 Plt 397 T Marlon 0.9. GCS 15.   ___Blood cultures no growth x 4 days  ___Sputum cultures: 1+ Normal Respiratory Kaelyn isolated, fungal cultures pending, Strep pneumo Antigen negative. RVP negative.    ___ER Provider Note: Clinical Impression: Sepsis due to PNA, SBP was in the 80s and he received 800 mL IVF instead of sepsis bolus due to concern for fluid overload.  ___H&P: Acute hypoxic respiratory failure due to PNA   Treatment: 800ml saline bolus in ER with lactic reassessment.   Infectious disease consult, blood cultures            Use of terms such as suspected, possible, or probable (associated with a specific diagnosis that is being evaluated, monitored, or treated as if it exists) are acceptable and can be coded in the inpatient setting, when documented at the time of discharge.     Please add any additional documentation to your progress note and continue to document this through discharge.  Thank you. For questions regarding this query, please contact Clinical : Fanta TINOCO RN, CCDS [phone]  This form is a permanent part of the medical record.

## 2024-11-19 NOTE — PAYOR COMM NOTE
--------------  DISCHARGE REVIEW    Payor: [employer] MEDICARE ADV PPO  Subscriber #:  [id number]  Authorization Number: [id number]    Admit date: 24  Admit time:   2:12 PM  Discharge Date: 2024  8:35 PM     Admitting Physician: Ben Ware DO  Attending Physician:  No att. providers found  Primary Care Physician: Colette Steele MD          Discharge Summary Notes        Discharge Summary signed by Ben Ware DO at 2024  6:07 PM       Author: Ben Ware DO Specialty: HOSPITALIST, Internal Medicine Author Type: Physician    Filed: 2024  6:07 PM Date of Service: 2024  5:57 PM Status: Signed    : Ben Ware DO (Physician)           [facility]     Wang Romero Patient Status:  Inpatient    1945 MRN [id number]   [facility] 8NE-A Attending Ben Ware DO   Hosp Day # 3 PCP Colette Steele MD     Date of Admission: 2024  Date of Discharge: 2024  Discharge Disposition:  2024 at 1745    Discharge Diagnosis:   #Acute hypoxic respiratory failure due to PNA and inability to expectorate secretions  #Bilateral PNA  #Upper airway secretions  #Chronic atrial fibrillation with intermittent RVR  #HLD  #Hypothyroidism  #Hx of RHD s/p bio AVR and MV repair   #S/p right TCAR on 10/15/2024  #H/o head and neck cancer with dysphagia s/p PEG  #Scabbing/crusting in posterior pharynx  #Xerostomia  #Deconditioning    History of Present Illness: Wang Romero is a 78 year old male with PMHx head and neck cancer s/p chemoradiation, dysphagia s/p PEG tube, hypothyroidism, chronic atrial fibrillation, rheumatoid heart disease s/p bio AVR and MVR in , BABS s/p stent in 2024, chronic anemia who presents to the hospital with cough and generalized weakness. He was discharged on 10/31 when admitted with GIB but he declined endoscopy. Hgb stabilized and bleeding resolved on its own. Maliajorge has been on  hold. He also had PNA and was discharged on ciprofloxacin which he completed 11/9. Since discharge, he has been weak. He is coughing and is SOB. No fever or chills. No nausea or vomiting. He has chronic diarrhea, no change in this.  No dysuria. In the ER, he was in atrial fibrillation with HR up to 120s. Lactic acid 2.1. SBP was in the 80s and he received 800 mL IVF instead of sepsis bolus due to concern for fluid overload. CXR with edema vs bilateral PNA. proBNP is 4391, lower than last month. He was placed on 2L oxygen.     Brief Synopsis: Patient was started on antibiotics with Unasyn for pneumonia.  He had elevated proBNP and atrial fibrillation with intermittent RVR.  Cardiology consulted but he did not appear clinically fluid overloaded.  EF was preserved and there were no wall motion abnormalities.  He did have scabbing/crusting in the posterior pharynx along with xerostomia.  ENT consulted and they did bedside endoscopy which showed no airway obstruction or obvious evidence of cancer recurrence.  There was thick mucus in the pharynx.  He was started on bronchopulmonary toilet due to significant secretions.  ENT also recommended humidified mask but he was not able to tolerate this.  He did not tolerate flutter valve either.  He was continued on albuterol nebs and hypertonic saline along with chest PT.  Repeat chest x-ray shows worsening infiltrates and ID was consulted.  Antibiotics were adjusted to Zosyn.  Patient had just finished his bolus tube feeds and he was getting flushed when he suddenly became bradycardic and less responsive.  RRT was called but he passed away.  Patient is a DO NOT RESUSCITATE.  Wife updated and arrived to the hospital.    Lace+ Score: 82  59-90 High Risk  29-58 Medium Risk  0-28   Low Risk       TCM Follow-Up Recommendation:  LACE > 58: High Risk of readmission after discharge from the hospital.    Procedures during hospitalization:   Bedside flexible laryngoscopy    Incidental or  significant findings and recommendations (brief descriptions):  None    Lab/Test results pending at Discharge:   None    Consultants:  Cardiology  Pulmonology  ENT  ID  Vascular surgery      Ben Ware DO    Time spent:  15 minutes    Electronically signed by Ben Ware DO on 11/16/2024  6:07 PM         REVIEWER COMMENTS

## 2024-11-22 ENCOUNTER — APPOINTMENT (OUTPATIENT)
Dept: INTERNAL MEDICINE | Age: 79
End: 2024-11-22

## (undated) DEVICE — SUT DEKNATEL POLYDEK 4-0 12XL30IN GRN POLY

## (undated) DEVICE — SUT PROL 5-0 24IN NABSRB BLU 13MM C-1 3/8

## (undated) DEVICE — ZZ-DISC-SUB-448812-SUT VCRL 3-0 27IN PS-1 ABSRB UD L24MM 3/8 CIR

## (undated) DEVICE — ANTIBACTERIAL UNDYED BRAIDED (POLYGLACTIN 910), SYNTHETIC ABSORBABLE SUTURE: Brand: COATED VICRYL

## (undated) DEVICE — 3M™ TEGADERM™ TRANSPARENT FILM DRESSING FRAME STYLE, 1626W, 4 IN X 4-3/4 IN (10 CM X 12 CM), 50/CT 4CT/CASE: Brand: 3M™ TEGADERM™

## (undated) DEVICE — PTA BALLOON DILATATION CATHETER: Brand: STERLING™

## (undated) DEVICE — PENCIL ES CRD L15FT MPLR BLDE HOLSTER

## (undated) DEVICE — PINNACLE INTRODUCER SHEATH: Brand: PINNACLE

## (undated) DEVICE — SUT ETHBND XL 3-0 30IN SH NABSRB GRN 26MM 1/2

## (undated) DEVICE — SUT PROL 6-0 24IN C-1 NABSRB BLU 13MM 3/8 CIR

## (undated) DEVICE — Device

## (undated) DEVICE — 3M™ BAIR HUGGER® UNDERBODY BLANKET, FULL ACCESS, 10 PER CASE 63500: Brand: BAIR HUGGER™

## (undated) DEVICE — KIT HEMSTAT MTRX 8ML PORCINE GEL HUM THROM

## (undated) DEVICE — SUT VCRL 2-0 36IN CT-1 ABSRB UD L36MM 1/2 CIR

## (undated) DEVICE — ADHESIVE SKIN TOP FOR WND CLSR DERMBND ADV

## (undated) DEVICE — PACK CV CUSTOM

## (undated) DEVICE — KIT INFL DEV 20ML W/ INSRT TOOL 3 W STPCOCK

## (undated) DEVICE — REM POLYHESIVE ADULT PATIENT RETURN ELECTRODE: Brand: VALLEYLAB

## (undated) DEVICE — ZZ-DISC-SUB-405166-SUT COAT VCRL 3-0 27IN SH ABSRB UD 26MM 1/2

## (undated) DEVICE — PROXIMATE RH ROTATING HEAD SKIN STAPLERS (35 WIDE) CONTAINS 35 STAINLESS STEEL STAPLES: Brand: PROXIMATE

## (undated) DEVICE — KIT PERIPH ACCS TRANSCAROTID W/ 7CM 21GA NDL

## (undated) DEVICE — Device: Brand: INTELLICART™

## (undated) DEVICE — SLEEVE COMPR MD KNEE LEN SGL USE KENDALL SCD

## (undated) DEVICE — FIXED CORE WIRE GUIDE SAFE-T-J, CURVED: Brand: COOK

## (undated) DEVICE — RUNTHROUGH NS EXTRA FLOPPY PTCA GUIDEWIRE: Brand: RUNTHROUGH

## (undated) DEVICE — STERILE POLYISOPRENE POWDER-FREE SURGICAL GLOVES: Brand: PROTEXIS

## (undated) DEVICE — 3M™ IOBAN™ 2 ANTIMICROBIAL INCISE DRAPE 6650EZ: Brand: IOBAN™ 2

## (undated) DEVICE — ANGLED-TIP ARTERIAL SHEATH CONFIGURATION: Brand: ENROUTE TRANSCAROTID NEUROPROTECTION SYSTEM

## (undated) DEVICE — SOLUTION IRRIG 1000ML 0.9% NACL USP BTL

## (undated) DEVICE — STANDARD HYPODERMIC NEEDLE,POLYPROPYLENE HUB: Brand: MONOJECT

## (undated) DEVICE — PACK CUSTOM PACEMAKER

## (undated) DEVICE — SUT PERMA- 2-0 30IN SH NABSRB BLK L26MM 1/

## (undated) DEVICE — 3M™ STERI-STRIP™ REINFORCED ADHESIVE SKIN CLOSURES, R1547, 1/2 IN X 4 IN (12 MM X 100 MM), 6 STRIPS/ENVELOPE: Brand: 3M™ STERI-STRIP™

## (undated) DEVICE — SUT PERMA- 2-0 30IN NABSRB BLK TIE SILK

## (undated) NOTE — LETTER
[facility] [address], [address]  Consent for Procedure/Sedation    Date: 12/3/20    Time:       1. I authorize the performance upon Cayetanomarcos Blevins the following:  Gastrostomy Tube Insertion     2.  I authorize Dr. Tessy Hadley (and Steffi Henderson Printed: 12/3/2020   1:37 PM  Patient Name: Kemi Marcial        : 1945       Medical Record #: [id number]

## (undated) NOTE — ED AVS SNAPSHOT
Rocío Tomlin   MRN: [id number]    Department:  [facility] Emergency Department   Date of Visit:  11/12/2019           Disclosure     Insurance plans vary and the physician(s) referred by the ER may not be covered by your plan.  Please contact yo tell this physician (or your personal doctor if your instructions are to return to your personal doctor) about any new or lasting problems. The primary care or specialist physician will see patients referred from the [facility] Emergency Department.  Ren Atkinson

## (undated) NOTE — LETTER
Wayne Mcclain M.D., F.A.C.S.  Saul Leo M.D., F.A.C.S.  Steve Dickson M.D.   Manuel Patel M.D., F.A.C.S.  ANDRE Napier M.D., F.A.C.S.   Manoj Corral M.D., F.A.C.S.  Michael Marr M.D.    ANDRE Moses M.D.   ANDRE Barrientos M.D., M.B.A.  Asim Roldan M.D.  ANDRE Bass M.D., F.A.C.S.  Lloyd Miller M.D., F.A.C.S.   ANDRE Jean M.D., F.A.C.S., F.A.C.C. Yair Sousa M.D.  Hudson Humphrey M.D.    NNEKA Lott D.O., F.A.C.S.  Cresencio Dunn M.D.   Judson Boyle M.D.  Morro Anthony M.D.    Wang Jones M.D., F.A.C.S.        Welcome to [facility]    As you contemplate possible surgical treatment, it is very important to us that you understand fully what is being discussed, that all of your questions have been answered, and that your options for treatment have been fully explained.    To that end, on the following page we will ask you some questions to make certain that you understand everything which has been explained to you. Included in this understanding is that there are both surgical and nonsurgical treatments available for you, that you have options regarding where your care is given, and what doctors are involved in your care. Included in these options would, of course, be the option to elect for no treatment whatsoever. We especially want to be sure that you have had a chance to have all of your questions and concerns answered. If there are any issues which have not been adequately addressed, we ask you to bring them forward so that we can thoroughly address them.    A patient who is fully informed and understands their condition and options for treatment, as well as potential adverse effects of treatment, is going to be a patient who receives the most benefit out of care rendered.  Our goal in addition to providing excellent surgical care is to provide the necessary information to you and your family in order to make decisions which are appropriate relative to your own care.    Please take the time necessary to read and answer the questions on the next page. Again, if you have any questions, bring them forward and we will certainly address them.    Sincerely,    [facility]    Date:___________________ _______________________ _______________________       Printed Patient Name  Signature of Patient    Date:___________________ _______________________ _______________________       Printed Witness Name  Signature of Witness    _______________________       Relationship of Witness to Patient        Consent Form Page: 1 of 2  [address] [facility] * [facility] [address] * [place] [address] * [phone] / [phone] *  Fax [phone] * Billing Fax [phone] Version: 9/9/2024    [facility]  Supplemental Consent Form    A [facility] surgeon has met with me and explained the matter of my illness, and what treatments might be available to improve my condition. As a result of that conversation, I understand the following:    A [facility] surgeon met with me and explained, in detail, the nature of my condition for which surgery is being contemplated. The procedure being performed is:  RIGHT TRANSCAROTID ARTERY REVASCULARIZATION     Yes _____ No _____    A [facility] surgeon met with me and explained to me that there are alternatives to surgery which may include no surgery, medical therapy, or interventional treatment, among other options and the risks and benefits of the different treatment options:Yes _____ No _____    A [facility] surgeon has explained to me that if I should desire, he/she is willing to explain my case and the surgical and non-surgical options to family members:            Yes _____ No _____    A [facility] surgeon has answered all of my  questions regarding the topics we have discussed. I have been invited to ask more questions:  Yes _____ No _____    A [facility] surgeon has explained to me that if I seek other options or wish treatment at elsewhere, that his/her office will assist me in making such recommendations:  Yes _____ No _____    A [facility] surgeon has explained to me that death, risk of bleeding, stroke, multi-organ failure, heart attack and/or other complications are risks for the proposed surgical procedure:        Yes _____ No _____    A [facility] surgeon has explained to me that I have the right to cancel or postpone the surgery at any time prior to the start of surgery:    Yes _____ No _____    The nature and options for treatment for my condition has been explained to me, in detail, by a [facility] surgeon and all questions have been answered to my satisfaction. I understand that I am not required to undergo surgery, and further, that if I so desire, I could have surgery accomplished by another surgeon or at another institution. I understand and accept that which has been explained to me. I am able to make my decisions knowingly and willingly based on the data.    Date:___________________ _______________________ _______________________       Printed Patient Name  Signature of Patient    Date:___________________ _______________________ _______________________       Printed Witness Name  Signature of Witness    _______________________   Relationship of Witness to Patient          Consent Form Page: 3 of  2  [address] [facility] * [address] * [place] [address] * [phone] / [phone] *  Fax [phone] * Janice Fax [phone] Version: 9/9/2024

## (undated) NOTE — LETTER
[facility]SW[facility]A  [address] [address]  [phone]    Blood Transfusion Consent    In the course of your treatment, it may become necessary to administer a transfusion of blood or blood components. This form provides basic information concerning this procedure and, if signed by you, authorizes its administration. By signing this form, you agree that all of your questions about the administration of blood or blood products have been answered by the ordering medical professional or designee.    Description of Procedure  Blood is introduced into one of your veins, commonly in the arm, using a sterilized disposable needle. The amount of blood transfused, and whether the transfusion will be of blood or blood components is a judgement the physician will make based on your particular needs.    Risks  The transfusion is a common procedure of low risk.  MINOR AND TEMPORARY REACTIONS ARE NOT UNCOMMON, including a slight bruise, swelling or local reaction in the area where the needle pierces your skin, or a nonserious reaction to the transfused material itself, including headache, fever or mild skin reaction, such as rash.  Serious reactions are possible, though very unlikely, and include severe allergic reaction (shock) and destruction (hemolysis) of transfused blood cells.  Infectious diseases which are known to be transmitted by blood transfusion include certain types of viral Hepatitis(liver infection from a virus), Human Immunodeficiency Virus (HIV-1,2) infection, a viral infection known to cause Acquired Immunodeficiency Syndrome (AIDS), as well as certain other bacterial, viral, and parasitic diseases. While a minimal risk of acquiring an infectious disease from transfused blood exists, in accordance with the Federal and State law, all due care has been taken in donor selection and testing to avoid transmission of disease.    Alternatives  If loss of blood poses serious threats during your  treatment, THERE IS NO EFFECTIVE ALTERNATIVE TO BLOOD TRANSFUSION. However, if you have any further questions on this matter, your provider will fully explain the alternatives to you if it has not already been done.    I, ______________________________, have read/had read to me the above. I understand the matters bearing on the decision whether or not to authorize a transfusion of blood or blood components. I have no questions which have not been answered to my full satisfaction. I hereby consent to such transfusion as my physician may deem necessary or advisable in the course of my treatment.    ______________________________________________                    ___________________________  (Signature of Patient or Responsible party in case of minor,                 (Printed Name of Patient or incompetent, or unconscious patient)              Responsible Party)    ___________________________               _____________________  (Relationship to Patient if not self)                                    (Date and Time)    __________________________                                                           ______________________              (Signature of Witness)               (Printed Name of Witness)     Language line ()    Telephone/Verbal/Video Consent    __________________________                     ____________________  (Signature of 2nd Witness           (Printed Name of 2nd  Telephone/Verbal/Video Consent)           Witness)    Patient Name: Wang Romero     : 1945                 Printed: 2024     Medical Record #: [id number]      Rev: 2023

## (undated) NOTE — ED AVS SNAPSHOT
Christa Ruelas   MRN: [id number]    Department:  [facility] Emergency Department   Date of Visit:  8/22/2017           Disclosure     Insurance plans vary and the physician(s) referred by the ER may not be covered by your plan.  Please contact you If you have been prescribed any medication(s), please fill your prescription right away and begin taking the medication(s) as directed    If the emergency physician has read X-rays, these will be re-interpreted by a radiologist.  If there is a significant